# Patient Record
Sex: FEMALE | Race: OTHER | HISPANIC OR LATINO | URBAN - METROPOLITAN AREA
[De-identification: names, ages, dates, MRNs, and addresses within clinical notes are randomized per-mention and may not be internally consistent; named-entity substitution may affect disease eponyms.]

---

## 2017-10-20 ENCOUNTER — EMERGENCY (EMERGENCY)
Facility: HOSPITAL | Age: 52
LOS: 1 days | Discharge: HOME | End: 2017-10-20
Attending: EMERGENCY MEDICINE | Admitting: EMERGENCY MEDICINE
Payer: COMMERCIAL

## 2017-10-20 VITALS
SYSTOLIC BLOOD PRESSURE: 122 MMHG | OXYGEN SATURATION: 96 % | RESPIRATION RATE: 16 BRPM | DIASTOLIC BLOOD PRESSURE: 78 MMHG | HEART RATE: 79 BPM | TEMPERATURE: 98 F

## 2017-10-20 VITALS
DIASTOLIC BLOOD PRESSURE: 91 MMHG | RESPIRATION RATE: 18 BRPM | SYSTOLIC BLOOD PRESSURE: 138 MMHG | HEART RATE: 88 BPM | OXYGEN SATURATION: 99 %

## 2017-10-20 LAB
ALBUMIN SERPL ELPH-MCNC: 3.3 G/DL — SIGNIFICANT CHANGE UP (ref 3.3–5)
ALP SERPL-CCNC: 80 U/L — SIGNIFICANT CHANGE UP (ref 40–120)
ALT FLD-CCNC: 9 U/L — SIGNIFICANT CHANGE UP (ref 4–33)
AST SERPL-CCNC: 13 U/L — SIGNIFICANT CHANGE UP (ref 4–32)
BASE EXCESS BLDV CALC-SCNC: -1.8 MMOL/L — SIGNIFICANT CHANGE UP
BASOPHILS # BLD AUTO: 0.03 K/UL — SIGNIFICANT CHANGE UP (ref 0–0.2)
BASOPHILS NFR BLD AUTO: 0.6 % — SIGNIFICANT CHANGE UP (ref 0–2)
BILIRUB SERPL-MCNC: < 0.2 MG/DL — LOW (ref 0.2–1.2)
BLOOD GAS VENOUS - CREATININE: 0.82 MG/DL — SIGNIFICANT CHANGE UP (ref 0.5–1.3)
BUN SERPL-MCNC: 17 MG/DL — SIGNIFICANT CHANGE UP (ref 7–23)
CALCIUM SERPL-MCNC: 8.1 MG/DL — LOW (ref 8.4–10.5)
CHLORIDE BLDV-SCNC: 111 MMOL/L — HIGH (ref 96–108)
CHLORIDE SERPL-SCNC: 108 MMOL/L — HIGH (ref 98–107)
CK MB BLD-MCNC: 1 NG/ML — SIGNIFICANT CHANGE UP (ref 1–4.7)
CK SERPL-CCNC: 63 U/L — SIGNIFICANT CHANGE UP (ref 25–170)
CO2 SERPL-SCNC: 22 MMOL/L — SIGNIFICANT CHANGE UP (ref 22–31)
CREAT SERPL-MCNC: 0.81 MG/DL — SIGNIFICANT CHANGE UP (ref 0.5–1.3)
EOSINOPHIL # BLD AUTO: 0.08 K/UL — SIGNIFICANT CHANGE UP (ref 0–0.5)
EOSINOPHIL NFR BLD AUTO: 1.5 % — SIGNIFICANT CHANGE UP (ref 0–6)
GAS PNL BLDV: 137 MMOL/L — SIGNIFICANT CHANGE UP (ref 136–146)
GLUCOSE BLDV-MCNC: 110 — HIGH (ref 70–99)
GLUCOSE SERPL-MCNC: 105 MG/DL — HIGH (ref 70–99)
HCO3 BLDV-SCNC: 22 MMOL/L — SIGNIFICANT CHANGE UP (ref 20–27)
HCT VFR BLD CALC: 38.1 % — SIGNIFICANT CHANGE UP (ref 34.5–45)
HCT VFR BLDV CALC: 38.2 % — SIGNIFICANT CHANGE UP (ref 34.5–45)
HGB BLD-MCNC: 12 G/DL — SIGNIFICANT CHANGE UP (ref 11.5–15.5)
HGB BLDV-MCNC: 12.4 G/DL — SIGNIFICANT CHANGE UP (ref 11.5–15.5)
IMM GRANULOCYTES # BLD AUTO: 0 # — SIGNIFICANT CHANGE UP
IMM GRANULOCYTES NFR BLD AUTO: 0 % — SIGNIFICANT CHANGE UP (ref 0–1.5)
LACTATE BLDV-MCNC: 1.3 MMOL/L — SIGNIFICANT CHANGE UP (ref 0.5–2)
LIDOCAIN IGE QN: 21.2 U/L — SIGNIFICANT CHANGE UP (ref 7–60)
LYMPHOCYTES # BLD AUTO: 1.01 K/UL — SIGNIFICANT CHANGE UP (ref 1–3.3)
LYMPHOCYTES # BLD AUTO: 19.2 % — SIGNIFICANT CHANGE UP (ref 13–44)
MAGNESIUM SERPL-MCNC: 1.8 MG/DL — SIGNIFICANT CHANGE UP (ref 1.6–2.6)
MCHC RBC-ENTMCNC: 28.5 PG — SIGNIFICANT CHANGE UP (ref 27–34)
MCHC RBC-ENTMCNC: 31.5 % — LOW (ref 32–36)
MCV RBC AUTO: 90.5 FL — SIGNIFICANT CHANGE UP (ref 80–100)
MONOCYTES # BLD AUTO: 0.44 K/UL — SIGNIFICANT CHANGE UP (ref 0–0.9)
MONOCYTES NFR BLD AUTO: 8.4 % — SIGNIFICANT CHANGE UP (ref 2–14)
NEUTROPHILS # BLD AUTO: 3.7 K/UL — SIGNIFICANT CHANGE UP (ref 1.8–7.4)
NEUTROPHILS NFR BLD AUTO: 70.3 % — SIGNIFICANT CHANGE UP (ref 43–77)
NRBC # FLD: 0 — SIGNIFICANT CHANGE UP
PCO2 BLDV: 51 MMHG — SIGNIFICANT CHANGE UP (ref 41–51)
PH BLDV: 7.29 PH — LOW (ref 7.32–7.43)
PHOSPHATE SERPL-MCNC: 2.7 MG/DL — SIGNIFICANT CHANGE UP (ref 2.5–4.5)
PLATELET # BLD AUTO: 315 K/UL — SIGNIFICANT CHANGE UP (ref 150–400)
PMV BLD: 10.8 FL — SIGNIFICANT CHANGE UP (ref 7–13)
PO2 BLDV: 41 MMHG — HIGH (ref 35–40)
POTASSIUM BLDV-SCNC: 3.3 MMOL/L — LOW (ref 3.4–4.5)
POTASSIUM SERPL-MCNC: 3.6 MMOL/L — SIGNIFICANT CHANGE UP (ref 3.5–5.3)
POTASSIUM SERPL-SCNC: 3.6 MMOL/L — SIGNIFICANT CHANGE UP (ref 3.5–5.3)
PROT SERPL-MCNC: 6.3 G/DL — SIGNIFICANT CHANGE UP (ref 6–8.3)
RBC # BLD: 4.21 M/UL — SIGNIFICANT CHANGE UP (ref 3.8–5.2)
RBC # FLD: 13.8 % — SIGNIFICANT CHANGE UP (ref 10.3–14.5)
SAO2 % BLDV: 67.4 % — SIGNIFICANT CHANGE UP (ref 60–85)
SODIUM SERPL-SCNC: 142 MMOL/L — SIGNIFICANT CHANGE UP (ref 135–145)
TROPONIN T SERPL-MCNC: < 0.06 NG/ML — SIGNIFICANT CHANGE UP (ref 0–0.06)
TSH SERPL-MCNC: 1.04 UIU/ML — SIGNIFICANT CHANGE UP (ref 0.27–4.2)
WBC # BLD: 5.26 K/UL — SIGNIFICANT CHANGE UP (ref 3.8–10.5)
WBC # FLD AUTO: 5.26 K/UL — SIGNIFICANT CHANGE UP (ref 3.8–10.5)

## 2017-10-20 PROCEDURE — 99285 EMERGENCY DEPT VISIT HI MDM: CPT | Mod: 25

## 2017-10-20 PROCEDURE — 71020: CPT | Mod: 26

## 2017-10-20 RX ORDER — ONDANSETRON 8 MG/1
1 TABLET, FILM COATED ORAL
Qty: 1 | Refills: 0
Start: 2017-10-20 | End: 2017-10-23

## 2017-10-20 RX ORDER — FAMOTIDINE 10 MG/ML
20 INJECTION INTRAVENOUS ONCE
Qty: 0 | Refills: 0 | Status: COMPLETED | OUTPATIENT
Start: 2017-10-20 | End: 2017-10-20

## 2017-10-20 RX ADMIN — Medication 30 MILLILITER(S): at 06:26

## 2017-10-20 RX ADMIN — FAMOTIDINE 20 MILLIGRAM(S): 10 INJECTION INTRAVENOUS at 06:26

## 2017-10-20 NOTE — ED ADULT NURSE NOTE - CHIEF COMPLAINT QUOTE
Pt c/o severe mid-abdominal pain, states "it feels like indigestion."  Pt reports last BM yesterday was normal.  Pt given Zofran 4mg IV by EMS.

## 2017-10-20 NOTE — ED ADULT TRIAGE NOTE - CHIEF COMPLAINT QUOTE
Pt c/o severe mid-abdominal pain, states "it feels like indigestion."  Pt given Zofran 4mg IV by EMS. Pt c/o severe mid-abdominal pain, states "it feels like indigestion."  Pt reports last BM yesterday was normal.  Pt given Zofran 4mg IV by EMS.

## 2017-10-20 NOTE — ED ADULT NURSE NOTE - OBJECTIVE STATEMENT
Received pt to room 26. pt is alert and oriented x3. c/o feeling weak and having abdominal pain. pt passed out after having a bowel movement. no CP or SOB or nausea vomiting or diarrhea. 20 G placed on left wrist by EMS and IV NS infusing  . pt denies dizziness . EKG dome. MD norris in progress.
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
## 2017-10-20 NOTE — ED PROVIDER NOTE - ATTENDING CONTRIBUTION TO CARE
DR. GUEVARA, ATTENDING MD-  I performed a face to face bedside interview with patient regarding history of present illness, review of symptoms and past medical history. I completed an independent physical exam.  I have discussed patient's plan of care with the resident.   Documentation as above in the note.  HPI: 53 yo F with hypothyroid, GERD per EGD that presents with epigastric pain and ?syncope tonight after having BM. Pt reports recently under a lot of stress due to trying to get demented father in assisted living and work. Recently trying to lose weight but under supervision by MD. Was at home, woke up from epigastric pain, went to bathroom, stood up, next thing she knew she was on ground. unsure if hit head, no head pain now. Denies N/V, vision/hearing changes, no chest pain, SOB. Epigastric pain non radiating, no back pain. Denies numbness/tingling, weakness, fevers, chills, diarrhea. Had a hard time having BM. Able to pass gas. No recent illnesses/travel/trauma. No long immobilization, no history of DVT/PE and no famhx of this. Saw rheum MD a year ago was told no autoimmune diseases. Denies smoking, drinking, drugs. not sleeping well lately. No bleeding from GI recently. LMP last week, heavier than normal.   EXAM: Well appearing, NAD, head atraumatic, eyes EOMI/PERRL, Abd soft nontender NEG MURHPYS, NEG MCBURNEYS, no rebound, no guarding, extremities all normal with normal pulses. no MSK deformities.   MDM: Worry for GERD vs pancreatitis. Unlikely ACS or PE or AAA. No HTN, no smoking, < 65 years old, no CAD, no HLP. No risk factors. Possibly GERD secondary to known GERD from EGD and epigastric pain without GIB. Also syncope worry for vasovagal. She had diaphoresis prior to syncope, straining. Will obtain labs and imaging and reassess. EKG NSR, no ischemic changes. Pt and  are reluctant to stay in hospital and would rather go home and f/u outpt dependant on outcome of tests.

## 2017-12-22 ENCOUNTER — OUTPATIENT (OUTPATIENT)
Dept: OUTPATIENT SERVICES | Facility: HOSPITAL | Age: 52
LOS: 1 days | End: 2017-12-22
Payer: COMMERCIAL

## 2017-12-22 ENCOUNTER — APPOINTMENT (OUTPATIENT)
Dept: ULTRASOUND IMAGING | Facility: CLINIC | Age: 52
End: 2017-12-22
Payer: COMMERCIAL

## 2017-12-22 ENCOUNTER — APPOINTMENT (OUTPATIENT)
Dept: MAMMOGRAPHY | Facility: CLINIC | Age: 52
End: 2017-12-22
Payer: COMMERCIAL

## 2017-12-22 DIAGNOSIS — Z00.8 ENCOUNTER FOR OTHER GENERAL EXAMINATION: ICD-10-CM

## 2017-12-22 PROCEDURE — 76641 ULTRASOUND BREAST COMPLETE: CPT

## 2017-12-22 PROCEDURE — 77063 BREAST TOMOSYNTHESIS BI: CPT | Mod: 26

## 2017-12-22 PROCEDURE — G0202: CPT | Mod: 26

## 2017-12-22 PROCEDURE — 76641 ULTRASOUND BREAST COMPLETE: CPT | Mod: 26,50

## 2017-12-22 PROCEDURE — 77067 SCR MAMMO BI INCL CAD: CPT

## 2017-12-22 PROCEDURE — 77063 BREAST TOMOSYNTHESIS BI: CPT

## 2018-08-11 ENCOUNTER — APPOINTMENT (OUTPATIENT)
Dept: ULTRASOUND IMAGING | Facility: IMAGING CENTER | Age: 53
End: 2018-08-11
Payer: COMMERCIAL

## 2018-08-11 ENCOUNTER — OUTPATIENT (OUTPATIENT)
Dept: OUTPATIENT SERVICES | Facility: HOSPITAL | Age: 53
LOS: 1 days | End: 2018-08-11
Payer: COMMERCIAL

## 2018-08-11 DIAGNOSIS — Z00.8 ENCOUNTER FOR OTHER GENERAL EXAMINATION: ICD-10-CM

## 2018-08-11 PROCEDURE — 76830 TRANSVAGINAL US NON-OB: CPT | Mod: 26

## 2018-08-11 PROCEDURE — 76856 US EXAM PELVIC COMPLETE: CPT

## 2018-08-11 PROCEDURE — 76830 TRANSVAGINAL US NON-OB: CPT

## 2019-01-28 ENCOUNTER — APPOINTMENT (OUTPATIENT)
Dept: PLASTIC SURGERY | Facility: CLINIC | Age: 54
End: 2019-01-28
Payer: SELF-PAY

## 2019-01-28 VITALS — HEIGHT: 63 IN | BODY MASS INDEX: 32.78 KG/M2 | WEIGHT: 185 LBS

## 2019-01-28 DIAGNOSIS — Z78.9 OTHER SPECIFIED HEALTH STATUS: ICD-10-CM

## 2019-01-28 DIAGNOSIS — Z83.3 FAMILY HISTORY OF DIABETES MELLITUS: ICD-10-CM

## 2019-01-28 DIAGNOSIS — E03.9 HYPOTHYROIDISM, UNSPECIFIED: ICD-10-CM

## 2019-01-28 DIAGNOSIS — E06.3 AUTOIMMUNE THYROIDITIS: ICD-10-CM

## 2019-01-28 PROCEDURE — 99201 OFFICE OUTPATIENT NEW 10 MINUTES: CPT

## 2019-01-28 RX ORDER — LIRAGLUTIDE 6 MG/ML
INJECTION, SOLUTION SUBCUTANEOUS
Refills: 0 | Status: ACTIVE | COMMUNITY

## 2019-01-28 RX ORDER — LORCASERIN HYDROCHLORIDE HEMIHYDRATE 10 MG/1
TABLET ORAL
Refills: 0 | Status: ACTIVE | COMMUNITY

## 2019-01-28 RX ORDER — LEVOTHYROXINE SODIUM 0.11 MG/1
112 TABLET ORAL
Refills: 0 | Status: ACTIVE | COMMUNITY

## 2019-01-28 RX ORDER — BUPROPION HYDROCHLORIDE 75 MG/1
TABLET, FILM COATED ORAL
Refills: 0 | Status: ACTIVE | COMMUNITY

## 2019-02-01 NOTE — REVIEW OF SYSTEMS
[Negative] : Heme/Lymph [Fever] : no fever [Chills] : no chills [Nosebleeds] : no nosebleeds [Nasal Discharge] : no nasal discharge [Sore Throat] : no sore throat [Chest Pain] : no chest pain [Palpitations] : no palpitations [Shortness Of Breath] : no shortness of breath [Cough] : no cough [Abdominal Pain] : no abdominal pain [Dysuria] : no dysuria [Easy Bleeding] : no tendency for easy bleeding [Easy Bruising] : no tendency for easy bruising

## 2019-02-01 NOTE — REASON FOR VISIT
[Consultation] : a consultation visit [FreeTextEntry1] : Patient presents to the office today for an abdominoplasty and breast lift consultation. Patient states she had four children via- . Patient states she gain a lot of weight during all pregnancies. Patient states with dieting and exercising she has slowly been losing weight. Patient states her current weight is 185lb. Patient denies rashes under excess skin. Patient states her current bra size is 36B to C. Patient states she breast fed all her four children after births. Patient c/o ptosis of bilateral breasts. Patient denies nipple discharge and nipple inversion. Patient states all breast imaging is up to date and reported to be WNL. Patient states there is family Hx of breast cancer: mother stage one.

## 2019-02-02 ENCOUNTER — EMERGENCY (EMERGENCY)
Facility: HOSPITAL | Age: 54
LOS: 1 days | Discharge: HOME | End: 2019-02-02
Attending: EMERGENCY MEDICINE | Admitting: EMERGENCY MEDICINE
Payer: COMMERCIAL

## 2019-02-02 VITALS
TEMPERATURE: 98 F | SYSTOLIC BLOOD PRESSURE: 135 MMHG | OXYGEN SATURATION: 100 % | DIASTOLIC BLOOD PRESSURE: 91 MMHG | HEART RATE: 95 BPM | RESPIRATION RATE: 20 BRPM

## 2019-02-02 VITALS
RESPIRATION RATE: 17 BRPM | SYSTOLIC BLOOD PRESSURE: 134 MMHG | OXYGEN SATURATION: 99 % | DIASTOLIC BLOOD PRESSURE: 86 MMHG | HEART RATE: 85 BPM | TEMPERATURE: 98 F

## 2019-02-02 LAB
ALBUMIN SERPL ELPH-MCNC: 3.9 G/DL — SIGNIFICANT CHANGE UP (ref 3.3–5)
ALP SERPL-CCNC: 56 U/L — SIGNIFICANT CHANGE UP (ref 40–120)
ALT FLD-CCNC: 14 U/L — SIGNIFICANT CHANGE UP (ref 4–33)
ANION GAP SERPL CALC-SCNC: 12 MMO/L — SIGNIFICANT CHANGE UP (ref 7–14)
APPEARANCE UR: CLEAR — SIGNIFICANT CHANGE UP
AST SERPL-CCNC: 17 U/L — SIGNIFICANT CHANGE UP (ref 4–32)
BACTERIA # UR AUTO: NEGATIVE — SIGNIFICANT CHANGE UP
BASOPHILS # BLD AUTO: 0.02 K/UL — SIGNIFICANT CHANGE UP (ref 0–0.2)
BASOPHILS NFR BLD AUTO: 0.4 % — SIGNIFICANT CHANGE UP (ref 0–2)
BILIRUB SERPL-MCNC: 0.3 MG/DL — SIGNIFICANT CHANGE UP (ref 0.2–1.2)
BILIRUB UR-MCNC: NEGATIVE — SIGNIFICANT CHANGE UP
BLD GP AB SCN SERPL QL: NEGATIVE — SIGNIFICANT CHANGE UP
BLOOD UR QL VISUAL: HIGH
BUN SERPL-MCNC: 12 MG/DL — SIGNIFICANT CHANGE UP (ref 7–23)
CALCIUM SERPL-MCNC: 9.3 MG/DL — SIGNIFICANT CHANGE UP (ref 8.4–10.5)
CHLORIDE SERPL-SCNC: 101 MMOL/L — SIGNIFICANT CHANGE UP (ref 98–107)
CO2 SERPL-SCNC: 25 MMOL/L — SIGNIFICANT CHANGE UP (ref 22–31)
COLOR SPEC: SIGNIFICANT CHANGE UP
CREAT SERPL-MCNC: 0.78 MG/DL — SIGNIFICANT CHANGE UP (ref 0.5–1.3)
EOSINOPHIL # BLD AUTO: 0.04 K/UL — SIGNIFICANT CHANGE UP (ref 0–0.5)
EOSINOPHIL NFR BLD AUTO: 0.8 % — SIGNIFICANT CHANGE UP (ref 0–6)
GLUCOSE SERPL-MCNC: 70 MG/DL — SIGNIFICANT CHANGE UP (ref 70–99)
GLUCOSE UR-MCNC: NEGATIVE — SIGNIFICANT CHANGE UP
HCT VFR BLD CALC: 37.8 % — SIGNIFICANT CHANGE UP (ref 34.5–45)
HGB BLD-MCNC: 11.9 G/DL — SIGNIFICANT CHANGE UP (ref 11.5–15.5)
HYALINE CASTS # UR AUTO: NEGATIVE — SIGNIFICANT CHANGE UP
IMM GRANULOCYTES NFR BLD AUTO: 0.2 % — SIGNIFICANT CHANGE UP (ref 0–1.5)
KETONES UR-MCNC: NEGATIVE — SIGNIFICANT CHANGE UP
LEUKOCYTE ESTERASE UR-ACNC: NEGATIVE — SIGNIFICANT CHANGE UP
LYMPHOCYTES # BLD AUTO: 1.37 K/UL — SIGNIFICANT CHANGE UP (ref 1–3.3)
LYMPHOCYTES # BLD AUTO: 28.4 % — SIGNIFICANT CHANGE UP (ref 13–44)
MCHC RBC-ENTMCNC: 28.3 PG — SIGNIFICANT CHANGE UP (ref 27–34)
MCHC RBC-ENTMCNC: 31.5 % — LOW (ref 32–36)
MCV RBC AUTO: 90 FL — SIGNIFICANT CHANGE UP (ref 80–100)
MONOCYTES # BLD AUTO: 0.34 K/UL — SIGNIFICANT CHANGE UP (ref 0–0.9)
MONOCYTES NFR BLD AUTO: 7.1 % — SIGNIFICANT CHANGE UP (ref 2–14)
NEUTROPHILS # BLD AUTO: 3.04 K/UL — SIGNIFICANT CHANGE UP (ref 1.8–7.4)
NEUTROPHILS NFR BLD AUTO: 63.1 % — SIGNIFICANT CHANGE UP (ref 43–77)
NITRITE UR-MCNC: NEGATIVE — SIGNIFICANT CHANGE UP
NRBC # FLD: 0 K/UL — LOW (ref 25–125)
PH UR: 6 — SIGNIFICANT CHANGE UP (ref 5–8)
PLATELET # BLD AUTO: 310 K/UL — SIGNIFICANT CHANGE UP (ref 150–400)
PMV BLD: 10.2 FL — SIGNIFICANT CHANGE UP (ref 7–13)
POTASSIUM SERPL-MCNC: 4 MMOL/L — SIGNIFICANT CHANGE UP (ref 3.5–5.3)
POTASSIUM SERPL-SCNC: 4 MMOL/L — SIGNIFICANT CHANGE UP (ref 3.5–5.3)
PROT SERPL-MCNC: 7.1 G/DL — SIGNIFICANT CHANGE UP (ref 6–8.3)
PROT UR-MCNC: 20 — SIGNIFICANT CHANGE UP
RBC # BLD: 4.2 M/UL — SIGNIFICANT CHANGE UP (ref 3.8–5.2)
RBC # FLD: 13.3 % — SIGNIFICANT CHANGE UP (ref 10.3–14.5)
RBC CASTS # UR COMP ASSIST: HIGH (ref 0–?)
RH IG SCN BLD-IMP: POSITIVE — SIGNIFICANT CHANGE UP
SODIUM SERPL-SCNC: 138 MMOL/L — SIGNIFICANT CHANGE UP (ref 135–145)
SP GR SPEC: 1.01 — SIGNIFICANT CHANGE UP (ref 1–1.04)
SQUAMOUS # UR AUTO: SIGNIFICANT CHANGE UP
UROBILINOGEN FLD QL: NORMAL — SIGNIFICANT CHANGE UP
WBC # BLD: 4.82 K/UL — SIGNIFICANT CHANGE UP (ref 3.8–10.5)
WBC # FLD AUTO: 4.82 K/UL — SIGNIFICANT CHANGE UP (ref 3.8–10.5)
WBC UR QL: SIGNIFICANT CHANGE UP (ref 0–?)

## 2019-02-02 PROCEDURE — 76830 TRANSVAGINAL US NON-OB: CPT | Mod: 26

## 2019-02-02 PROCEDURE — 99284 EMERGENCY DEPT VISIT MOD MDM: CPT

## 2019-02-02 RX ORDER — IBUPROFEN 200 MG
600 TABLET ORAL ONCE
Qty: 0 | Refills: 0 | Status: DISCONTINUED | OUTPATIENT
Start: 2019-02-02 | End: 2019-02-02

## 2019-02-02 RX ORDER — IBUPROFEN 200 MG
1 TABLET ORAL
Qty: 40 | Refills: 0
Start: 2019-02-02 | End: 2019-02-11

## 2019-02-02 RX ORDER — KETOROLAC TROMETHAMINE 30 MG/ML
30 SYRINGE (ML) INJECTION ONCE
Qty: 0 | Refills: 0 | Status: DISCONTINUED | OUTPATIENT
Start: 2019-02-02 | End: 2019-02-02

## 2019-02-02 RX ADMIN — Medication 30 MILLIGRAM(S): at 19:46

## 2019-02-02 NOTE — ED PROVIDER NOTE - ATTENDING CONTRIBUTION TO CARE
agree with resident note  "53F, pmh of fibroids, endometriosis presenting with vaginal bleeding. Patient reports 10 days of heavy vaginal bleeding with clots. Goes through 8 pads a day. Has associated lower abdominal cramping and generalized fatigue. "  Denies chest pain, SOB, syncope.      PE: well appearing; no pallor; VSS: CTAB/L; s1 s2 no m/r/g abd soft/minimal uterine tenderness; ext: no edema    Imp: likely fibroids; will get labs and US and reassess

## 2019-02-02 NOTE — ED PROVIDER NOTE - PROGRESS NOTE DETAILS
updated patient and family on results. will discharge with OB/GYN follow-up. - resident Diogenes Campos

## 2019-02-02 NOTE — ED PROVIDER NOTE - NSFOLLOWUPINSTRUCTIONS_ED_ALL_ED_FT
Please follow-up with your OB/GYN in the next 24-48 hours   If you have any chest pain, difficulty breathing or feel lightheaded please return to the emergency department

## 2019-02-02 NOTE — ED ADULT NURSE NOTE - OBJECTIVE STATEMENT
pt c/o vag bleed increasing passing clots and feeling very tired/ iv inserted and labs done pt c/o vag bleed increasing passing clots and feeling very tired/ iv inserted and labs done, pt comfortable

## 2019-02-02 NOTE — ED PROVIDER NOTE - MEDICAL DECISION MAKING DETAILS
53F presenting with abnormal vaginal bleeding. no chest pain or difficulty breathing. small amount of blood in vaginal vault. bleeding likely 2/2 fibroids. plan for cbc, cmp, ucg, transvaginal ultrasound. will reassess.

## 2019-02-02 NOTE — ED ADULT TRIAGE NOTE - CHIEF COMPLAINT QUOTE
pt with Hx. Fibroids, endometriosis coming heavy vag. bleeding with blood clots x 10 days.  taking pain meds w/o relief, on birth control for vag. bleeding control.

## 2019-02-02 NOTE — ED PROVIDER NOTE - PHYSICAL EXAMINATION
General: well appearing female, no acute distress   HEENT: normocephalic, atraumatic   Respiratory: normal work of breathing, lungs clear to auscultation bilaterally   Cardiac: regular rate and rhythm   Abdomen: soft, non-tender   : small amount of blood in vaginal vault, no cervical lesions (Patience Brewer)  MSk: no swelling or tenderness of lower extremities   Neuro: A&Ox3

## 2019-02-02 NOTE — ED PROVIDER NOTE - OBJECTIVE STATEMENT
53F, pmh of fibroids, endometriosis presenting with vaginal bleeding. Patient reports 10 days of heavy vaginal bleeding with clots. Goes through 8 pads a day. Has associated lower abdominal cramping and generalized fatigue. Denies any fever, chest pain, difficulty breathing, nausea, vomiting. Has urinary frequency but no pain with urination. Seen by OB/GYN two weeks ago without any abnormalities. Patient takes OCPs.

## 2019-02-04 LAB
BACTERIA UR CULT: SIGNIFICANT CHANGE UP
SPECIMEN SOURCE: SIGNIFICANT CHANGE UP

## 2019-02-20 ENCOUNTER — FORM ENCOUNTER (OUTPATIENT)
Age: 54
End: 2019-02-20

## 2019-02-21 ENCOUNTER — OUTPATIENT (OUTPATIENT)
Dept: OUTPATIENT SERVICES | Facility: HOSPITAL | Age: 54
LOS: 1 days | End: 2019-02-21
Payer: COMMERCIAL

## 2019-02-21 ENCOUNTER — APPOINTMENT (OUTPATIENT)
Dept: CT IMAGING | Facility: CLINIC | Age: 54
End: 2019-02-21
Payer: COMMERCIAL

## 2019-02-21 DIAGNOSIS — Z00.8 ENCOUNTER FOR OTHER GENERAL EXAMINATION: ICD-10-CM

## 2019-02-21 PROCEDURE — 74177 CT ABD & PELVIS W/CONTRAST: CPT

## 2019-02-21 PROCEDURE — 74177 CT ABD & PELVIS W/CONTRAST: CPT | Mod: 26

## 2019-02-22 ENCOUNTER — APPOINTMENT (OUTPATIENT)
Dept: ULTRASOUND IMAGING | Facility: IMAGING CENTER | Age: 54
End: 2019-02-22
Payer: COMMERCIAL

## 2019-02-22 ENCOUNTER — APPOINTMENT (OUTPATIENT)
Dept: MAMMOGRAPHY | Facility: IMAGING CENTER | Age: 54
End: 2019-02-22
Payer: COMMERCIAL

## 2019-02-22 ENCOUNTER — OUTPATIENT (OUTPATIENT)
Dept: OUTPATIENT SERVICES | Facility: HOSPITAL | Age: 54
LOS: 1 days | End: 2019-02-22
Payer: COMMERCIAL

## 2019-02-22 DIAGNOSIS — Z00.8 ENCOUNTER FOR OTHER GENERAL EXAMINATION: ICD-10-CM

## 2019-02-22 PROCEDURE — 77067 SCR MAMMO BI INCL CAD: CPT | Mod: 26

## 2019-02-22 PROCEDURE — 76641 ULTRASOUND BREAST COMPLETE: CPT | Mod: 26,50

## 2019-02-22 PROCEDURE — 77063 BREAST TOMOSYNTHESIS BI: CPT | Mod: 26

## 2019-02-22 PROCEDURE — 76641 ULTRASOUND BREAST COMPLETE: CPT

## 2019-02-22 PROCEDURE — 77067 SCR MAMMO BI INCL CAD: CPT

## 2019-02-22 PROCEDURE — 77063 BREAST TOMOSYNTHESIS BI: CPT

## 2019-02-27 ENCOUNTER — APPOINTMENT (OUTPATIENT)
Dept: ULTRASOUND IMAGING | Facility: IMAGING CENTER | Age: 54
End: 2019-02-27
Payer: COMMERCIAL

## 2019-02-27 ENCOUNTER — APPOINTMENT (OUTPATIENT)
Dept: MAMMOGRAPHY | Facility: IMAGING CENTER | Age: 54
End: 2019-02-27
Payer: COMMERCIAL

## 2019-02-27 ENCOUNTER — OUTPATIENT (OUTPATIENT)
Dept: OUTPATIENT SERVICES | Facility: HOSPITAL | Age: 54
LOS: 1 days | End: 2019-02-27
Payer: COMMERCIAL

## 2019-02-27 DIAGNOSIS — Z00.8 ENCOUNTER FOR OTHER GENERAL EXAMINATION: ICD-10-CM

## 2019-02-27 PROCEDURE — 76642 ULTRASOUND BREAST LIMITED: CPT | Mod: 26,RT

## 2019-02-27 PROCEDURE — G0279: CPT

## 2019-02-27 PROCEDURE — 76642 ULTRASOUND BREAST LIMITED: CPT

## 2019-02-27 PROCEDURE — 77065 DX MAMMO INCL CAD UNI: CPT | Mod: 26,RT

## 2019-02-27 PROCEDURE — G0279: CPT | Mod: 26

## 2019-02-27 PROCEDURE — 77065 DX MAMMO INCL CAD UNI: CPT

## 2019-03-15 ENCOUNTER — EMERGENCY (EMERGENCY)
Facility: HOSPITAL | Age: 54
LOS: 1 days | Discharge: HOME | End: 2019-03-15
Attending: EMERGENCY MEDICINE | Admitting: EMERGENCY MEDICINE
Payer: COMMERCIAL

## 2019-03-15 VITALS
RESPIRATION RATE: 18 BRPM | DIASTOLIC BLOOD PRESSURE: 83 MMHG | OXYGEN SATURATION: 99 % | TEMPERATURE: 98 F | SYSTOLIC BLOOD PRESSURE: 119 MMHG | HEART RATE: 96 BPM

## 2019-03-15 LAB
ALBUMIN SERPL ELPH-MCNC: 4 G/DL — SIGNIFICANT CHANGE UP (ref 3.3–5)
ALP SERPL-CCNC: 69 U/L — SIGNIFICANT CHANGE UP (ref 40–120)
ALT FLD-CCNC: 26 U/L — SIGNIFICANT CHANGE UP (ref 4–33)
ANION GAP SERPL CALC-SCNC: 11 MMO/L — SIGNIFICANT CHANGE UP (ref 7–14)
ANISOCYTOSIS BLD QL: SLIGHT — SIGNIFICANT CHANGE UP
APPEARANCE UR: CLEAR — SIGNIFICANT CHANGE UP
AST SERPL-CCNC: 22 U/L — SIGNIFICANT CHANGE UP (ref 4–32)
BACTERIA # UR AUTO: NEGATIVE — SIGNIFICANT CHANGE UP
BASOPHILS # BLD AUTO: 0.02 K/UL — SIGNIFICANT CHANGE UP (ref 0–0.2)
BASOPHILS NFR BLD AUTO: 0.7 % — SIGNIFICANT CHANGE UP (ref 0–2)
BASOPHILS NFR SPEC: 0 % — SIGNIFICANT CHANGE UP (ref 0–2)
BILIRUB SERPL-MCNC: < 0.2 MG/DL — LOW (ref 0.2–1.2)
BILIRUB UR-MCNC: NEGATIVE — SIGNIFICANT CHANGE UP
BLASTS # FLD: 0 % — SIGNIFICANT CHANGE UP (ref 0–0)
BLD GP AB SCN SERPL QL: NEGATIVE — SIGNIFICANT CHANGE UP
BLOOD UR QL VISUAL: SIGNIFICANT CHANGE UP
BUN SERPL-MCNC: 14 MG/DL — SIGNIFICANT CHANGE UP (ref 7–23)
CALCIUM SERPL-MCNC: 9.3 MG/DL — SIGNIFICANT CHANGE UP (ref 8.4–10.5)
CHLORIDE SERPL-SCNC: 103 MMOL/L — SIGNIFICANT CHANGE UP (ref 98–107)
CO2 SERPL-SCNC: 25 MMOL/L — SIGNIFICANT CHANGE UP (ref 22–31)
COLOR SPEC: YELLOW — SIGNIFICANT CHANGE UP
CREAT SERPL-MCNC: 0.76 MG/DL — SIGNIFICANT CHANGE UP (ref 0.5–1.3)
EOSINOPHIL # BLD AUTO: 0.03 K/UL — SIGNIFICANT CHANGE UP (ref 0–0.5)
EOSINOPHIL NFR BLD AUTO: 1 % — SIGNIFICANT CHANGE UP (ref 0–6)
EOSINOPHIL NFR FLD: 0.9 % — SIGNIFICANT CHANGE UP (ref 0–6)
GIANT PLATELETS BLD QL SMEAR: PRESENT — SIGNIFICANT CHANGE UP
GLUCOSE SERPL-MCNC: 89 MG/DL — SIGNIFICANT CHANGE UP (ref 70–99)
GLUCOSE UR-MCNC: NEGATIVE — SIGNIFICANT CHANGE UP
HCG SERPL-ACNC: < 5 MIU/ML — SIGNIFICANT CHANGE UP
HCT VFR BLD CALC: 40.8 % — SIGNIFICANT CHANGE UP (ref 34.5–45)
HGB BLD-MCNC: 12.5 G/DL — SIGNIFICANT CHANGE UP (ref 11.5–15.5)
HYALINE CASTS # UR AUTO: NEGATIVE — SIGNIFICANT CHANGE UP
IMM GRANULOCYTES NFR BLD AUTO: 0 % — SIGNIFICANT CHANGE UP (ref 0–1.5)
KETONES UR-MCNC: NEGATIVE — SIGNIFICANT CHANGE UP
LEUKOCYTE ESTERASE UR-ACNC: SIGNIFICANT CHANGE UP
LIDOCAIN IGE QN: 14.3 U/L — SIGNIFICANT CHANGE UP (ref 7–60)
LYMPHOCYTES # BLD AUTO: 1.1 K/UL — SIGNIFICANT CHANGE UP (ref 1–3.3)
LYMPHOCYTES # BLD AUTO: 36.2 % — SIGNIFICANT CHANGE UP (ref 13–44)
LYMPHOCYTES NFR SPEC AUTO: 24.3 % — SIGNIFICANT CHANGE UP (ref 13–44)
MACROCYTES BLD QL: SLIGHT — SIGNIFICANT CHANGE UP
MCHC RBC-ENTMCNC: 28.2 PG — SIGNIFICANT CHANGE UP (ref 27–34)
MCHC RBC-ENTMCNC: 30.6 % — LOW (ref 32–36)
MCV RBC AUTO: 92.1 FL — SIGNIFICANT CHANGE UP (ref 80–100)
METAMYELOCYTES # FLD: 0 % — SIGNIFICANT CHANGE UP (ref 0–1)
MONOCYTES # BLD AUTO: 0.29 K/UL — SIGNIFICANT CHANGE UP (ref 0–0.9)
MONOCYTES NFR BLD AUTO: 9.5 % — SIGNIFICANT CHANGE UP (ref 2–14)
MONOCYTES NFR BLD: 4.5 % — SIGNIFICANT CHANGE UP (ref 2–9)
MYELOCYTES NFR BLD: 0 % — SIGNIFICANT CHANGE UP (ref 0–0)
NEUTROPHIL AB SER-ACNC: 61.3 % — SIGNIFICANT CHANGE UP (ref 43–77)
NEUTROPHILS # BLD AUTO: 1.6 K/UL — LOW (ref 1.8–7.4)
NEUTROPHILS NFR BLD AUTO: 52.6 % — SIGNIFICANT CHANGE UP (ref 43–77)
NEUTS BAND # BLD: 0 % — SIGNIFICANT CHANGE UP (ref 0–6)
NITRITE UR-MCNC: NEGATIVE — SIGNIFICANT CHANGE UP
NRBC # BLD: 1 /100WBC — SIGNIFICANT CHANGE UP
NRBC # FLD: 0 K/UL — LOW (ref 25–125)
OTHER - HEMATOLOGY %: 0 — SIGNIFICANT CHANGE UP
PH UR: 6 — SIGNIFICANT CHANGE UP (ref 5–8)
PLATELET # BLD AUTO: 354 K/UL — SIGNIFICANT CHANGE UP (ref 150–400)
PLATELET COUNT - ESTIMATE: NORMAL — SIGNIFICANT CHANGE UP
PMV BLD: 10.1 FL — SIGNIFICANT CHANGE UP (ref 7–13)
POTASSIUM SERPL-MCNC: 4.3 MMOL/L — SIGNIFICANT CHANGE UP (ref 3.5–5.3)
POTASSIUM SERPL-SCNC: 4.3 MMOL/L — SIGNIFICANT CHANGE UP (ref 3.5–5.3)
PROMYELOCYTES # FLD: 0 % — SIGNIFICANT CHANGE UP (ref 0–0)
PROT SERPL-MCNC: 7.2 G/DL — SIGNIFICANT CHANGE UP (ref 6–8.3)
PROT UR-MCNC: 20 — SIGNIFICANT CHANGE UP
RBC # BLD: 4.43 M/UL — SIGNIFICANT CHANGE UP (ref 3.8–5.2)
RBC # FLD: 13 % — SIGNIFICANT CHANGE UP (ref 10.3–14.5)
RBC CASTS # UR COMP ASSIST: HIGH (ref 0–?)
RH IG SCN BLD-IMP: POSITIVE — SIGNIFICANT CHANGE UP
SMUDGE CELLS # BLD: PRESENT — SIGNIFICANT CHANGE UP
SODIUM SERPL-SCNC: 139 MMOL/L — SIGNIFICANT CHANGE UP (ref 135–145)
SP GR SPEC: 1.03 — SIGNIFICANT CHANGE UP (ref 1–1.04)
SQUAMOUS # UR AUTO: SIGNIFICANT CHANGE UP
T3 SERPL-MCNC: 162.5 NG/DL — SIGNIFICANT CHANGE UP (ref 80–200)
T4 AB SER-ACNC: 10.12 UG/DL — SIGNIFICANT CHANGE UP (ref 5.1–13)
TSH SERPL-MCNC: 0.11 UIU/ML — LOW (ref 0.27–4.2)
UROBILINOGEN FLD QL: NORMAL — SIGNIFICANT CHANGE UP
VARIANT LYMPHS # BLD: 9 % — SIGNIFICANT CHANGE UP
WBC # BLD: 3.04 K/UL — LOW (ref 3.8–10.5)
WBC # FLD AUTO: 3.04 K/UL — LOW (ref 3.8–10.5)
WBC UR QL: SIGNIFICANT CHANGE UP (ref 0–?)

## 2019-03-15 PROCEDURE — 99284 EMERGENCY DEPT VISIT MOD MDM: CPT

## 2019-03-15 RX ORDER — SODIUM CHLORIDE 9 MG/ML
1000 INJECTION INTRAMUSCULAR; INTRAVENOUS; SUBCUTANEOUS ONCE
Qty: 0 | Refills: 0 | Status: COMPLETED | OUTPATIENT
Start: 2019-03-15 | End: 2019-03-15

## 2019-03-15 RX ORDER — FAMOTIDINE 10 MG/ML
20 INJECTION INTRAVENOUS ONCE
Qty: 0 | Refills: 0 | Status: COMPLETED | OUTPATIENT
Start: 2019-03-15 | End: 2019-03-15

## 2019-03-15 RX ADMIN — FAMOTIDINE 20 MILLIGRAM(S): 10 INJECTION INTRAVENOUS at 13:52

## 2019-03-15 RX ADMIN — SODIUM CHLORIDE 1000 MILLILITER(S): 9 INJECTION INTRAMUSCULAR; INTRAVENOUS; SUBCUTANEOUS at 13:52

## 2019-03-15 NOTE — ED PROVIDER NOTE - CLINICAL SUMMARY MEDICAL DECISION MAKING FREE TEXT BOX
54F with generalized weakness and fatigue, recent heavy menses. No report of depression. Plan for labs including TSH.

## 2019-03-15 NOTE — ED PROVIDER NOTE - CHPI ED SYMPTOMS NEG
Denies depression, n/v/d, fever, dysuria, CP, SOB, HA, dizziness, or prior hx of similar pain./no nausea/no diarrhea/no dysuria/no fever/no vomiting

## 2019-03-15 NOTE — ED ADULT TRIAGE NOTE - CCCP TRG CHIEF CMPLNT
abdominal pain/feels tired states" I could nt get out of bed" pt arrives A&Ox4 walking without assisst and appears well

## 2019-03-15 NOTE — ED PROVIDER NOTE - PROGRESS NOTE DETAILS
Labs reassuring, mild leukopenia w reactive lymphocytes may be related to viral infection. Labs o/w reassuring.  Plan for discharge home w close follow up. JAY Ron MD

## 2019-03-15 NOTE — ED PROVIDER NOTE - NSFOLLOWUPINSTRUCTIONS_ED_ALL_ED_FT
Take all medications as directed.  For pain take Ibuprofen (Motrin, Advil) 400mg-600mg every 6-8 hours or Acetaminophen (Tylenol) 250mg-650mg every 6-8 hours.  Follow up with your primary physician in 3-4 days. If needed call [phone] to find a primary care physician or call  [phone] to schedule an appointment with the general medicine clinic.   You were given copies of all labs from this ER visit, please take them to your appointment.  Return to the ER for worsening symptoms or any other concerns.

## 2019-03-15 NOTE — ED PROVIDER NOTE - OBJECTIVE STATEMENT
53 y/o F with a PMHx of Hypothyroidism presents to the ED c/o generalized fatigue and abd pain. Pt states over the past 5 days, she had fatigue, difficulties getting out of bed due to general tiredness, malaise, and weakness. Of note, heavy bleeding from menstruation. Last period was ongoing for 2 weeks and ended last week with 10 days of heavy bleeding with clots. She denotes having irregular menstruation since going on menopause. Last night, pt stated she develop intermittent sharp epigastric abd pain. She reports multiple of BM and worsening pain just before BM that then improves. Denies depression, n/v/d, fever, dysuria, CP, SOB, HA, dizziness, or prior hx of similar pain. Pt is complaint with her meds. No other acute complaints at time of eval. 53 y/o F with a PMHx of Hypothyroidism presents to the ED c/o generalized fatigue and abd pain. Pt states over the past 5 days, she had fatigue that has made it difficult to even get out of bed, malaise, and generalized weakness. Recent h/o heavy vaginal bleeding from menstruation. Last period was ongoing for 2 weeks and ended last week with 10 days of heavy bleeding with clots. She denotes having irregular menstruation since going on menopause. Last night, pt stated she develop intermittent sharp epigastric abd pain. She reports multiple of BM and worsening pain just before BM that then improves. Denies depression, n/v/d, fever, dysuria, CP, SOB, HA, dizziness, or prior hx of similar pain. Pt is complaint with her meds. No other acute complaints at time of eval. 55 y/o F with a PMHx of Hypothyroidism presents to the ED c/o generalized fatigue and abd pain. Pt states over the past 5 days, she had fatigue that has made it difficult to even get out of bed, malaise, and generalized weakness. Recent h/o heavy vaginal bleeding from menstruation. Last period was ongoing for 2 weeks and ended last week with 10 days of heavy bleeding with clots. She notes having irregular menstruation since starting menopause. Last night, pt stated she develop intermittent sharp epigastric abd pain. She reports multiple of BM and worsening pain just before BM that then improves. Now with minimal pain. Denies depression, n/v/d, fever, dysuria, CP, SOB, HA, dizziness, or prior hx of similar pain. Pt is complaint with her meds. No other acute complaints at time of eval. 55 y/o F with a PMHx of Hypothyroidism presents to the ED c/o generalized fatigue and abd pain. Pt states over the past 5 days, she had fatigue that has made it difficult to even get out of bed, malaise, and generalized weakness. Recent h/o heavy vaginal bleeding from menstruation. Last period was ongoing for 2 weeks and ended last week with 10 days of heavy bleeding with clots. She notes having irregular menstruation since starting menopause. Last night, pt stated she develop intermittent sharp epigastric abd pain. She reports multiple of BM and worsening pain just before BM that then improves. Now with minimal pain. Denies depression, n/v/d, fever, dysuria, CP, SOB, HA, dizziness, or prior hx of similar pain. Pt is complaint with her meds. Patient reports she has had similar episodes of fatigue previously, several years ago she was evaluated by a rheumatologist without a clear diagnosis.

## 2019-03-15 NOTE — ED PROVIDER NOTE - NS_ ATTENDINGSCRIBEDETAILS _ED_A_ED_FT
The scribe's documentation has been prepared under my direction and personally reviewed by me in its entirety. I confirm that the note above accurately reflects all work, treatment, procedures, and medical decision making performed by me. JAY Ron MD

## 2019-03-18 ENCOUNTER — RESULT REVIEW (OUTPATIENT)
Age: 54
End: 2019-03-18

## 2019-09-30 ENCOUNTER — APPOINTMENT (OUTPATIENT)
Dept: PLASTIC SURGERY | Facility: CLINIC | Age: 54
End: 2019-09-30

## 2019-10-03 ENCOUNTER — APPOINTMENT (OUTPATIENT)
Dept: ORTHOPEDIC SURGERY | Facility: CLINIC | Age: 54
End: 2019-10-03
Payer: COMMERCIAL

## 2019-10-03 VITALS — HEART RATE: 82 BPM | SYSTOLIC BLOOD PRESSURE: 112 MMHG | DIASTOLIC BLOOD PRESSURE: 79 MMHG

## 2019-10-03 VITALS — WEIGHT: 170 LBS | BODY MASS INDEX: 30.12 KG/M2 | HEIGHT: 63 IN

## 2019-10-03 DIAGNOSIS — M54.16 RADICULOPATHY, LUMBAR REGION: ICD-10-CM

## 2019-10-03 DIAGNOSIS — Z82.62 FAMILY HISTORY OF OSTEOPOROSIS: ICD-10-CM

## 2019-10-03 DIAGNOSIS — Z78.9 OTHER SPECIFIED HEALTH STATUS: ICD-10-CM

## 2019-10-03 DIAGNOSIS — Z80.9 FAMILY HISTORY OF MALIGNANT NEOPLASM, UNSPECIFIED: ICD-10-CM

## 2019-10-03 PROCEDURE — 72100 X-RAY EXAM L-S SPINE 2/3 VWS: CPT

## 2019-10-03 PROCEDURE — 99204 OFFICE O/P NEW MOD 45 MIN: CPT

## 2019-10-03 RX ORDER — PREDNISONE 5 MG/1
5 TABLET ORAL
Qty: 30 | Refills: 0 | Status: ACTIVE | COMMUNITY
Start: 2019-10-03 | End: 1900-01-01

## 2019-10-07 ENCOUNTER — FORM ENCOUNTER (OUTPATIENT)
Age: 54
End: 2019-10-07

## 2019-10-08 ENCOUNTER — OUTPATIENT (OUTPATIENT)
Dept: OUTPATIENT SERVICES | Facility: HOSPITAL | Age: 54
LOS: 1 days | End: 2019-10-08
Payer: COMMERCIAL

## 2019-10-08 ENCOUNTER — APPOINTMENT (OUTPATIENT)
Dept: MRI IMAGING | Facility: CLINIC | Age: 54
End: 2019-10-08
Payer: COMMERCIAL

## 2019-10-08 DIAGNOSIS — M54.16 RADICULOPATHY, LUMBAR REGION: ICD-10-CM

## 2019-10-08 PROCEDURE — 72148 MRI LUMBAR SPINE W/O DYE: CPT

## 2019-10-08 PROCEDURE — 72148 MRI LUMBAR SPINE W/O DYE: CPT | Mod: 26

## 2019-10-11 NOTE — PHYSICAL EXAM
[de-identified] : Constitutional: Well-nourished, well-developed, No acute distress\par Respiratory:  Good respiratory effort, no SOB\par Lymphatic: No regional lymphadenopathy, no lymphedema\par Psychiatric: Pleasant and normal affect, alert and oriented x3\par Skin: Clean dry and intact B/L UE/LE\par Musculoskeletal: normal except where as noted in regional exam\par \par Lumbar Spine Exam\par \par APPEARANCE: no marked deformities or malalignment, + loss of lordotic curvature of the lumbosacral spine. no marked deformities. + poor posture\par POSITIVE TENDERNESS: + IL/SI/ST ligs, + TTP of b/l SI joints, + b/l lower lumbar tenderness and spasm noted in erector spinae and quadratus lumborum\par NONTENDER: no bony midline tenderness.\par ROM: Mildly limited in all directions, mildly painful at end range of flexion and extension\par RESISTIVE TESTING: painless 5/5 resisted flex/ext, sidebending b/l, and rotation\par SPECIAL TESTS: neg SLR b/l, neg TIFFANIE b/l, neg Trendelenburg b/l \par PULSES: 2+ DP/PT pulses\par NEURO:  L1 - S2 intact to sensation and motor, DTRs 2+/4 patella and achilles\par \par B/L Hips: No asymmetry, malalignment, or swelling, Full ROM, 5/5 strength in flexion/ext, IR/ER, Abd/Add, Joints stable\par B/L Knees: No asymmetry, malalignment, or swelling, Full ROM, 5/5 strength in flexion/ext, Joints stable\par B/L Ankles: No asymmetry, malalignment, or swelling, Full ROM, 5/5 strength in DF/PF/Inv/Ev, Joints stable\par \par  [de-identified] : The following radiographs were ordered and read by me during this patient's visit. I reviewed each radiograph in detail with the patient and discussed the findings as highlighted below. \par \par 2 views of the lumbar spine were obtained today that show no fracture, or dislocation. There are no degenerative changes seen. There is no malalignment. No obvious osseous abnormality. Otherwise unremarkable.\par \par

## 2019-10-11 NOTE — DISCUSSION/SUMMARY
[de-identified] : Discussed findings of today's exam and possible causes of patient's pain.  Educated patient on their most probable diagnosis of acute on chronic low back pain, with symptomatic radiculopathy.  Reviewed possible courses of treatment, and we collaboratively decided best course of treatment at this time will include conservative management. The patient significant pain and limited function recommend an oral prednisone taper to address acute inflammation.  Patient will be started on a course of physical therapy to restore normal range of motion and strength as tolerated. I also recommend advanced imaging with MRI to evaluate for stenosis/herniation, or possibly ankylosing spondylitis.  Patient will follow up when MRI results are available.  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 Patient appreciates and agrees with current plan.\par \par This note was generated using dragon medical dictation software.  A reasonable effort has been made for proofreading its contents, but typos may still remain.  If there are any questions or points of clarification needed please notify my office.
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
## 2020-02-03 ENCOUNTER — APPOINTMENT (OUTPATIENT)
Dept: PLASTIC SURGERY | Facility: CLINIC | Age: 55
End: 2020-02-03
Payer: COMMERCIAL

## 2020-02-03 VITALS — HEIGHT: 62.5 IN | BODY MASS INDEX: 31.22 KG/M2 | WEIGHT: 174 LBS

## 2020-02-03 DIAGNOSIS — E65 LOCALIZED ADIPOSITY: ICD-10-CM

## 2020-02-03 PROCEDURE — 99212 OFFICE O/P EST SF 10 MIN: CPT

## 2020-02-04 PROBLEM — E65 ABDOMINAL PANNUS: Status: ACTIVE | Noted: 2019-02-01

## 2020-02-04 NOTE — PHYSICAL EXAM
[de-identified] : breast ptosis is present; there is no open wound, palpable mass, or erythema; breast asymmetry is present [de-identified] : there is supraumbilical fullness; abdominal pannus with skin excess is present

## 2020-02-04 NOTE — REASON FOR VISIT
[Follow-Up: _____] : a [unfilled] follow-up visit [FreeTextEntry1] : patient presents to further discuss abdominoplasty

## 2020-02-04 NOTE — HISTORY OF PRESENT ILLNESS
[FreeTextEntry1] : 55 yo female is interested in abdominoplasty and presents for further discussion.  She states she has lost weight, approximately 20 lbs.   She states she would like to lose an additional 10-15lbs.

## 2020-03-18 ENCOUNTER — APPOINTMENT (OUTPATIENT)
Dept: SURGICAL ONCOLOGY | Facility: CLINIC | Age: 55
End: 2020-03-18

## 2020-12-28 ENCOUNTER — OUTPATIENT (OUTPATIENT)
Dept: OUTPATIENT SERVICES | Facility: HOSPITAL | Age: 55
LOS: 1 days | End: 2020-12-28
Payer: COMMERCIAL

## 2020-12-28 VITALS
HEIGHT: 63 IN | TEMPERATURE: 99 F | DIASTOLIC BLOOD PRESSURE: 90 MMHG | SYSTOLIC BLOOD PRESSURE: 118 MMHG | OXYGEN SATURATION: 97 % | WEIGHT: 181 LBS | RESPIRATION RATE: 20 BRPM | HEART RATE: 120 BPM

## 2020-12-28 DIAGNOSIS — N92.4 EXCESSIVE BLEEDING IN THE PREMENOPAUSAL PERIOD: ICD-10-CM

## 2020-12-28 DIAGNOSIS — N93.9 ABNORMAL UTERINE AND VAGINAL BLEEDING, UNSPECIFIED: ICD-10-CM

## 2020-12-28 DIAGNOSIS — N92.1 EXCESSIVE AND FREQUENT MENSTRUATION WITH IRREGULAR CYCLE: ICD-10-CM

## 2020-12-28 DIAGNOSIS — Z98.890 OTHER SPECIFIED POSTPROCEDURAL STATES: Chronic | ICD-10-CM

## 2020-12-28 DIAGNOSIS — Z90.49 ACQUIRED ABSENCE OF OTHER SPECIFIED PARTS OF DIGESTIVE TRACT: Chronic | ICD-10-CM

## 2020-12-28 DIAGNOSIS — R10.2 PELVIC AND PERINEAL PAIN: ICD-10-CM

## 2020-12-28 DIAGNOSIS — Z01.818 ENCOUNTER FOR OTHER PREPROCEDURAL EXAMINATION: ICD-10-CM

## 2020-12-28 DIAGNOSIS — D25.9 LEIOMYOMA OF UTERUS, UNSPECIFIED: ICD-10-CM

## 2020-12-28 LAB
A1C WITH ESTIMATED AVERAGE GLUCOSE RESULT: 5.2 % — SIGNIFICANT CHANGE UP (ref 4–5.6)
ANION GAP SERPL CALC-SCNC: 13 MMOL/L — SIGNIFICANT CHANGE UP (ref 5–17)
BUN SERPL-MCNC: 13 MG/DL — SIGNIFICANT CHANGE UP (ref 7–23)
CALCIUM SERPL-MCNC: 9.6 MG/DL — SIGNIFICANT CHANGE UP (ref 8.4–10.5)
CHLORIDE SERPL-SCNC: 102 MMOL/L — SIGNIFICANT CHANGE UP (ref 96–108)
CO2 SERPL-SCNC: 24 MMOL/L — SIGNIFICANT CHANGE UP (ref 22–31)
CREAT SERPL-MCNC: 0.74 MG/DL — SIGNIFICANT CHANGE UP (ref 0.5–1.3)
ESTIMATED AVERAGE GLUCOSE: 103 MG/DL — SIGNIFICANT CHANGE UP (ref 68–114)
GLUCOSE SERPL-MCNC: 93 MG/DL — SIGNIFICANT CHANGE UP (ref 70–99)
HCT VFR BLD CALC: 40.8 % — SIGNIFICANT CHANGE UP (ref 34.5–45)
HGB BLD-MCNC: 13.3 G/DL — SIGNIFICANT CHANGE UP (ref 11.5–15.5)
MCHC RBC-ENTMCNC: 29 PG — SIGNIFICANT CHANGE UP (ref 27–34)
MCHC RBC-ENTMCNC: 32.6 GM/DL — SIGNIFICANT CHANGE UP (ref 32–36)
MCV RBC AUTO: 88.9 FL — SIGNIFICANT CHANGE UP (ref 80–100)
NRBC # BLD: 0 /100 WBCS — SIGNIFICANT CHANGE UP (ref 0–0)
PLATELET # BLD AUTO: 318 K/UL — SIGNIFICANT CHANGE UP (ref 150–400)
POTASSIUM SERPL-MCNC: 4.2 MMOL/L — SIGNIFICANT CHANGE UP (ref 3.5–5.3)
POTASSIUM SERPL-SCNC: 4.2 MMOL/L — SIGNIFICANT CHANGE UP (ref 3.5–5.3)
RBC # BLD: 4.59 M/UL — SIGNIFICANT CHANGE UP (ref 3.8–5.2)
RBC # FLD: 14.2 % — SIGNIFICANT CHANGE UP (ref 10.3–14.5)
SODIUM SERPL-SCNC: 139 MMOL/L — SIGNIFICANT CHANGE UP (ref 135–145)
WBC # BLD: 3.17 K/UL — LOW (ref 3.8–10.5)
WBC # FLD AUTO: 3.17 K/UL — LOW (ref 3.8–10.5)

## 2020-12-28 PROCEDURE — 85027 COMPLETE CBC AUTOMATED: CPT

## 2020-12-28 PROCEDURE — 83036 HEMOGLOBIN GLYCOSYLATED A1C: CPT

## 2020-12-28 PROCEDURE — G0463: CPT

## 2020-12-28 PROCEDURE — 80048 BASIC METABOLIC PNL TOTAL CA: CPT

## 2020-12-28 PROCEDURE — 87086 URINE CULTURE/COLONY COUNT: CPT

## 2020-12-28 RX ORDER — SODIUM CHLORIDE 9 MG/ML
3 INJECTION INTRAMUSCULAR; INTRAVENOUS; SUBCUTANEOUS EVERY 8 HOURS
Refills: 0 | Status: DISCONTINUED | OUTPATIENT
Start: 2021-01-11 | End: 2021-01-25

## 2020-12-28 RX ORDER — BUPROPION HYDROCHLORIDE 150 MG/1
1 TABLET, EXTENDED RELEASE ORAL
Qty: 0 | Refills: 0 | DISCHARGE

## 2020-12-28 RX ORDER — LIDOCAINE HCL 20 MG/ML
0.2 VIAL (ML) INJECTION ONCE
Refills: 0 | Status: DISCONTINUED | OUTPATIENT
Start: 2021-01-11 | End: 2021-01-25

## 2020-12-28 NOTE — H&P PST ADULT - ATTENDING COMMENTS
I consented the patient for the proposed procedure including for a pelvic examination under anesthesia by myself and my assistants in the room.

## 2020-12-28 NOTE — H&P PST ADULT - NSICDXPASTSURGICALHX_GEN_ALL_CORE_FT
PAST SURGICAL HISTORY:  S/P dilatation and curettage x 1 - missed     S/P laparoscopic cholecystectomy     S/P laparoscopic surgery for endometriosis    S/P LASIK (laser assisted in situ keratomileusis) of both eyes

## 2020-12-28 NOTE — H&P PST ADULT - NSANTHOSAYNRD_GEN_A_CORE
No. SULTANA screening performed.  STOP BANG Legend: 0-2 = LOW Risk; 3-4 = INTERMEDIATE Risk; 5-8 = HIGH Risk

## 2020-12-28 NOTE — H&P PST ADULT - NSICDXPASTMEDICALHX_GEN_ALL_CORE_FT
PAST MEDICAL HISTORY:  Anxiety and depression     Endometriosis     H/O gastritis     History of diverticulitis     History of umbilical hernia     Hypothyroidism     IBS (irritable bowel syndrome)     Kidney stones     Uterine fibroid     Uterine polyp

## 2020-12-28 NOTE — H&P PST ADULT - HISTORY OF PRESENT ILLNESS
55 year old female with history of endometriosis       covid test - 1/8/2021- [facility]  55 year old female with history of endometriosis , Hypothyroid, depression & anxiety, with menorrhagia lasting for 2o days for past 8 mths, Pt c/o fatigue . Now coming in for Laparoscopic vaginal hysterectomy, Laparoscopic bilateral salpingectomy , Possible total laparoscopic hysterectomy, Cystourethroscopy on 1/11/2021.       covid test - 1/8/2021- [facility]

## 2020-12-28 NOTE — H&P PST ADULT - NSICDXPROBLEM_GEN_ALL_CORE_FT
PROBLEM DIAGNOSES  Problem: Uterine bleeding  Assessment and Plan:        PROBLEM DIAGNOSES  Problem: Uterine bleeding  Assessment and Plan: Laparoscopic vaginal hysterectomy, Laparoscopic bilateral salpingectomy , Possible total laparoscopic hysterectomy, Cystourethroscopy on 1/11/2021.

## 2020-12-29 LAB
CULTURE RESULTS: SIGNIFICANT CHANGE UP
SPECIMEN SOURCE: SIGNIFICANT CHANGE UP

## 2020-12-29 RX ORDER — ACETAMINOPHEN 500 MG
1000 TABLET ORAL ONCE
Refills: 0 | Status: DISCONTINUED | OUTPATIENT
Start: 2021-01-11 | End: 2021-01-25

## 2021-01-05 PROBLEM — K58.9 IRRITABLE BOWEL SYNDROME WITHOUT DIARRHEA: Chronic | Status: ACTIVE | Noted: 2020-12-28

## 2021-01-05 PROBLEM — Z87.19 PERSONAL HISTORY OF OTHER DISEASES OF THE DIGESTIVE SYSTEM: Chronic | Status: ACTIVE | Noted: 2020-12-28

## 2021-01-05 PROBLEM — N20.0 CALCULUS OF KIDNEY: Chronic | Status: ACTIVE | Noted: 2020-12-28

## 2021-01-05 PROBLEM — F41.9 ANXIETY DISORDER, UNSPECIFIED: Chronic | Status: ACTIVE | Noted: 2020-12-28

## 2021-01-05 PROBLEM — K58.9 IRRITABLE BOWEL SYNDROME, UNSPECIFIED: Chronic | Status: ACTIVE | Noted: 2020-12-28

## 2021-01-05 PROBLEM — D25.9 LEIOMYOMA OF UTERUS, UNSPECIFIED: Chronic | Status: ACTIVE | Noted: 2020-12-28

## 2021-01-08 ENCOUNTER — OUTPATIENT (OUTPATIENT)
Dept: OUTPATIENT SERVICES | Facility: HOSPITAL | Age: 56
LOS: 1 days | End: 2021-01-08
Payer: COMMERCIAL

## 2021-01-08 DIAGNOSIS — Z90.49 ACQUIRED ABSENCE OF OTHER SPECIFIED PARTS OF DIGESTIVE TRACT: Chronic | ICD-10-CM

## 2021-01-08 DIAGNOSIS — Z98.890 OTHER SPECIFIED POSTPROCEDURAL STATES: Chronic | ICD-10-CM

## 2021-01-08 DIAGNOSIS — Z20.828 CONTACT WITH AND (SUSPECTED) EXPOSURE TO OTHER VIRAL COMMUNICABLE DISEASES: ICD-10-CM

## 2021-01-08 LAB — SARS-COV-2 RNA SPEC QL NAA+PROBE: SIGNIFICANT CHANGE UP

## 2021-01-08 PROCEDURE — C9803: CPT

## 2021-01-08 PROCEDURE — U0005: CPT

## 2021-01-08 PROCEDURE — U0003: CPT

## 2021-01-10 ENCOUNTER — TRANSCRIPTION ENCOUNTER (OUTPATIENT)
Age: 56
End: 2021-01-10

## 2021-01-10 RX ORDER — SIMETHICONE 80 MG/1
80 TABLET, CHEWABLE ORAL EVERY 6 HOURS
Refills: 0 | Status: DISCONTINUED | OUTPATIENT
Start: 2021-01-11 | End: 2021-01-25

## 2021-01-10 RX ORDER — ONDANSETRON 8 MG/1
8 TABLET, FILM COATED ORAL EVERY 8 HOURS
Refills: 0 | Status: DISCONTINUED | OUTPATIENT
Start: 2021-01-11 | End: 2021-01-25

## 2021-01-10 RX ORDER — ACETAMINOPHEN 500 MG
1000 TABLET ORAL EVERY 6 HOURS
Refills: 0 | Status: DISCONTINUED | OUTPATIENT
Start: 2021-01-11 | End: 2021-01-25

## 2021-01-10 RX ORDER — OXYCODONE HYDROCHLORIDE 5 MG/1
5 TABLET ORAL EVERY 4 HOURS
Refills: 0 | Status: DISCONTINUED | OUTPATIENT
Start: 2021-01-11 | End: 2021-01-11

## 2021-01-10 RX ORDER — OXYCODONE HYDROCHLORIDE 5 MG/1
10 TABLET ORAL EVERY 4 HOURS
Refills: 0 | Status: DISCONTINUED | OUTPATIENT
Start: 2021-01-11 | End: 2021-01-11

## 2021-01-11 ENCOUNTER — OUTPATIENT (OUTPATIENT)
Dept: OUTPATIENT SERVICES | Facility: HOSPITAL | Age: 56
LOS: 1 days | End: 2021-01-11
Payer: COMMERCIAL

## 2021-01-11 ENCOUNTER — RESULT REVIEW (OUTPATIENT)
Age: 56
End: 2021-01-11

## 2021-01-11 VITALS
TEMPERATURE: 98 F | DIASTOLIC BLOOD PRESSURE: 77 MMHG | SYSTOLIC BLOOD PRESSURE: 121 MMHG | WEIGHT: 181 LBS | HEART RATE: 86 BPM | HEIGHT: 63 IN | RESPIRATION RATE: 16 BRPM | OXYGEN SATURATION: 99 %

## 2021-01-11 VITALS
DIASTOLIC BLOOD PRESSURE: 64 MMHG | TEMPERATURE: 98 F | OXYGEN SATURATION: 99 % | HEART RATE: 84 BPM | SYSTOLIC BLOOD PRESSURE: 105 MMHG

## 2021-01-11 DIAGNOSIS — N93.9 ABNORMAL UTERINE AND VAGINAL BLEEDING, UNSPECIFIED: ICD-10-CM

## 2021-01-11 DIAGNOSIS — Z98.890 OTHER SPECIFIED POSTPROCEDURAL STATES: Chronic | ICD-10-CM

## 2021-01-11 DIAGNOSIS — N92.4 EXCESSIVE BLEEDING IN THE PREMENOPAUSAL PERIOD: ICD-10-CM

## 2021-01-11 DIAGNOSIS — D25.9 LEIOMYOMA OF UTERUS, UNSPECIFIED: ICD-10-CM

## 2021-01-11 DIAGNOSIS — N92.1 EXCESSIVE AND FREQUENT MENSTRUATION WITH IRREGULAR CYCLE: ICD-10-CM

## 2021-01-11 DIAGNOSIS — R10.2 PELVIC AND PERINEAL PAIN: ICD-10-CM

## 2021-01-11 DIAGNOSIS — Z90.49 ACQUIRED ABSENCE OF OTHER SPECIFIED PARTS OF DIGESTIVE TRACT: Chronic | ICD-10-CM

## 2021-01-11 LAB
HCT VFR BLD CALC: 34.7 % — SIGNIFICANT CHANGE UP (ref 34.5–45)
HGB BLD-MCNC: 11.3 G/DL — LOW (ref 11.5–15.5)

## 2021-01-11 PROCEDURE — C9399: CPT

## 2021-01-11 PROCEDURE — 88302 TISSUE EXAM BY PATHOLOGIST: CPT | Mod: 26

## 2021-01-11 PROCEDURE — 88307 TISSUE EXAM BY PATHOLOGIST: CPT

## 2021-01-11 PROCEDURE — 88307 TISSUE EXAM BY PATHOLOGIST: CPT | Mod: 26

## 2021-01-11 PROCEDURE — 88302 TISSUE EXAM BY PATHOLOGIST: CPT

## 2021-01-11 PROCEDURE — 82962 GLUCOSE BLOOD TEST: CPT

## 2021-01-11 PROCEDURE — 58552 LAPARO-VAG HYST INCL T/O: CPT

## 2021-01-11 PROCEDURE — 85014 HEMATOCRIT: CPT

## 2021-01-11 PROCEDURE — 85018 HEMOGLOBIN: CPT

## 2021-01-11 RX ORDER — HYOSCYAMINE SULFATE 0.13 MG
1 TABLET ORAL
Qty: 0 | Refills: 0 | DISCHARGE

## 2021-01-11 RX ORDER — LEVOTHYROXINE SODIUM 125 MCG
1 TABLET ORAL
Qty: 0 | Refills: 0 | DISCHARGE

## 2021-01-11 RX ORDER — CHLORHEXIDINE GLUCONATE 213 G/1000ML
1 SOLUTION TOPICAL ONCE
Refills: 0 | Status: COMPLETED | OUTPATIENT
Start: 2021-01-11 | End: 2021-01-11

## 2021-01-11 RX ORDER — PHENTERMINE HCL 30 MG
1 CAPSULE ORAL
Qty: 0 | Refills: 0 | DISCHARGE

## 2021-01-11 RX ORDER — CEFOTETAN DISODIUM 1 G
2 VIAL (EA) INJECTION ONCE
Refills: 0 | Status: COMPLETED | OUTPATIENT
Start: 2021-01-11 | End: 2021-01-11

## 2021-01-11 RX ORDER — OMEPRAZOLE 10 MG/1
1 CAPSULE, DELAYED RELEASE ORAL
Qty: 0 | Refills: 0 | DISCHARGE

## 2021-01-11 RX ORDER — LIOTHYRONINE SODIUM 25 UG/1
2 TABLET ORAL
Qty: 0 | Refills: 0 | DISCHARGE

## 2021-01-11 RX ORDER — ACETAMINOPHEN 500 MG
975 TABLET ORAL EVERY 6 HOURS
Refills: 0 | Status: DISCONTINUED | OUTPATIENT
Start: 2021-01-11 | End: 2021-01-25

## 2021-01-11 RX ORDER — CELECOXIB 200 MG/1
400 CAPSULE ORAL ONCE
Refills: 0 | Status: COMPLETED | OUTPATIENT
Start: 2021-01-11 | End: 2021-01-11

## 2021-01-11 RX ORDER — OXYCODONE HYDROCHLORIDE 5 MG/1
5 TABLET ORAL EVERY 4 HOURS
Refills: 0 | Status: DISCONTINUED | OUTPATIENT
Start: 2021-01-11 | End: 2021-01-11

## 2021-01-11 RX ORDER — BUPROPION HYDROCHLORIDE 150 MG/1
3 TABLET, EXTENDED RELEASE ORAL
Qty: 0 | Refills: 0 | DISCHARGE

## 2021-01-11 RX ORDER — GABAPENTIN 400 MG/1
600 CAPSULE ORAL ONCE
Refills: 0 | Status: COMPLETED | OUTPATIENT
Start: 2021-01-11 | End: 2021-01-11

## 2021-01-11 RX ORDER — IBUPROFEN 200 MG
600 TABLET ORAL EVERY 6 HOURS
Refills: 0 | Status: DISCONTINUED | OUTPATIENT
Start: 2021-01-11 | End: 2021-01-25

## 2021-01-11 RX ADMIN — CELECOXIB 400 MILLIGRAM(S): 200 CAPSULE ORAL at 06:51

## 2021-01-11 RX ADMIN — Medication 1000 MILLIGRAM(S): at 06:51

## 2021-01-11 RX ADMIN — GABAPENTIN 600 MILLIGRAM(S): 400 CAPSULE ORAL at 06:52

## 2021-01-11 NOTE — ASU PATIENT PROFILE, ADULT - PMH
Anxiety and depression    Endometriosis    H/O gastritis    History of diverticulitis    History of umbilical hernia    Hypothyroidism    IBS (irritable bowel syndrome)    Kidney stones    Uterine fibroid    Uterine polyp

## 2021-01-11 NOTE — ASU DISCHARGE PLAN (ADULT/PEDIATRIC) - CARE PROVIDER_API CALL
Linda Mckenzie)  Obstetrics and Gynecology  [address], [address]  [place] [address]  Phone: [phone]  Fax: [phone]  Follow Up Time:

## 2021-01-11 NOTE — ASU DISCHARGE PLAN (ADULT/PEDIATRIC) - ACTIVITY LEVEL
8 weeks/No heavy lifting/No weight bearing/Nothing per vagina/No tub baths/No douching/No tampons/No intercourse

## 2021-01-11 NOTE — CHART NOTE - NSCHARTNOTEFT_GEN_A_CORE
R1 OBGYN POST-OP CHECK    S: Patient seen and evaluated at bedside.  Pt sleeping, easily arousable. Patient reports pain controlled with analgesia. Pt denies N/V, SOB, CP, palpitations, fever/chills. Has not yet attempted PO intake.  Not OOB yet.    O:   T(C): 36.8 (01-11-21 @ 11:45), Max: 36.8 (01-11-21 @ 11:45)  HR: 87 (01-11-21 @ 14:15) (87 - 93)  BP: 101/58 (01-11-21 @ 14:15) (89/56 - 118/69)  RR: 14 (01-11-21 @ 14:15) (10 - 14)  SpO2: 94% (01-11-21 @ 14:15) (94% - 99%)  Wt(kg): --  I&O's Summary    11 Jan 2021 07:01  -  11 Jan 2021 14:38  --------------------------------------------------------  IN: 700 mL / OUT: 0 mL / NET: 700 mL        Gen: Resting comfortably in bed, NAD  CV: S1S2, RRR  Lungs: CTA B/L  Abd: soft, appropriately tender, occasional BS x 4 quadrants.    Inc: Umbilical incision c/d/i  : scant blood on pad  Ext: SCD's in place and functional, non-tender b/l, no edema    Labs:                11.3   x     )-----------( x        ( 01-11 @ 12:37 )             34.7           A/P: 55y Female s/p LAVH, BS, cysto seen in PACU. Patient doing well postoperatively.     Neuro: PO Analgesia PRN   CV: Hemodynamically stable.  Monitor VS. Post-op H/H 11.3/34.7  Pulm: Saturating well on room air.  Encourage OOB and incentive spirometer use.   GI: Advance diet as tolerated. Anti-emetics PRN.  : DTV@530pm. Bladder scan following 1st void. If PVR>150cc, will repeat after next void with goal PVR<150cc  Electrolytes: LR@125cc/hr.   Heme: DVT ppx w/ SCD's while in bed. Early ambulation, initially with assistance then as tolerated.   ID: Afebrile  Endo: No active issues   Dispo: Discharge from PACU when criteria met.     Dev, PGY1

## 2021-01-11 NOTE — ASU DISCHARGE PLAN (ADULT/PEDIATRIC) - ASU DC SPECIAL INSTRUCTIONSFT
Discharge Instructions:  1. Diet: advance as tolerated  2. Activity limited by:   - no running, heavy lifting, strenuous exercise,   - nothing in vagina (bath, swim, intercourse, douching, tampons) for 2 weeks  3. Medications:   - Senna to prevent constipation (please hold for loose stools)  - Ibuprofen 600mg every 6 hours as needed for pain  - Acetaminophen 500mg every 4 hours as needed for pain  - Oxycodone 5mg every 4 hours for severe pain   4. Follow-up appointment - 2 weeks. Please call the office upon discharge to schedule your appointment if you do not have one already.   5. Precautions:  - Call the office or go to the ED if you have any of the followin) Fever >100.4 that does not resolve  2) Intractable pain  3) Heavy bleeding

## 2021-01-11 NOTE — PRE-ANESTHESIA EVALUATION ADULT - MALLAMPATI CLASS
31 yo  at 32w5d here for routine visit.  No problems.   by phone.  RTC 2 weeks.  
Class IV (difficult) - the soft palate is not visible at all

## 2021-01-11 NOTE — ASU PATIENT PROFILE, ADULT - PSH
S/P dilatation and curettage  x 1 - missed   S/P laparoscopic cholecystectomy    S/P laparoscopic surgery  for endometriosis  S/P LASIK (laser assisted in situ keratomileusis) of both eyes

## 2021-01-11 NOTE — BRIEF OPERATIVE NOTE - NSICDXBRIEFPROCEDURE_GEN_ALL_CORE_FT
PROCEDURES:  Laparoscopic assisted vaginal hysterectomy for uterus 250 grams or less 11-Jan-2021 12:01:33  Linda Mckenzie

## 2021-01-11 NOTE — PACU DISCHARGE NOTE - COMMENTS
Patient voided twice, residual >200ml.  She was discharged home with Guerrero, according to Dr. Mckenzie's instructions.

## 2021-02-23 NOTE — PHYSICAL EXAM
[NI] : Normal [de-identified] : sternal notch to nipple on the left is 26.5 cm and 26 cm on the right.  The nipple to IMF on the left is 10 cm and 11 cm on the right.  The base width is 11 cm bilaterally.  There is grade 2 ptosis bilaterally.  There is breast asymmetry, the right breast is lower than the left breast.  There are no open wounds or palpable masses of either breast. [de-identified] : multiple laparoscopic port site scars, umbilical bulge/possible hernia Burow's Graft Text: The defect edges were debeveled with a #15 scalpel blade.  Given the location of the defect, shape of the defect, the proximity to free margins and the presence of a standing cone deformity a Burow's skin graft was deemed most appropriate. The standing cone was removed and this tissue was then trimmed to the shape of the primary defect. The adipose tissue was also removed until only dermis and epidermis were left.  The skin margins of the secondary defect were undermined to an appropriate distance in all directions utilizing iris scissors.  The secondary defect was closed with interrupted buried subcutaneous sutures.  The skin edges were then re-apposed with running  sutures.  The skin graft was then placed in the primary defect and oriented appropriately.

## 2021-08-20 ENCOUNTER — OUTPATIENT (OUTPATIENT)
Dept: OUTPATIENT SERVICES | Facility: HOSPITAL | Age: 56
LOS: 1 days | End: 2021-08-20
Payer: COMMERCIAL

## 2021-08-20 ENCOUNTER — APPOINTMENT (OUTPATIENT)
Dept: MAMMOGRAPHY | Facility: IMAGING CENTER | Age: 56
End: 2021-08-20
Payer: COMMERCIAL

## 2021-08-20 ENCOUNTER — APPOINTMENT (OUTPATIENT)
Dept: ULTRASOUND IMAGING | Facility: IMAGING CENTER | Age: 56
End: 2021-08-20
Payer: COMMERCIAL

## 2021-08-20 DIAGNOSIS — R92.2 INCONCLUSIVE MAMMOGRAM: ICD-10-CM

## 2021-08-20 DIAGNOSIS — Z98.890 OTHER SPECIFIED POSTPROCEDURAL STATES: Chronic | ICD-10-CM

## 2021-08-20 DIAGNOSIS — Z90.49 ACQUIRED ABSENCE OF OTHER SPECIFIED PARTS OF DIGESTIVE TRACT: Chronic | ICD-10-CM

## 2021-08-20 DIAGNOSIS — Z12.31 ENCOUNTER FOR SCREENING MAMMOGRAM FOR MALIGNANT NEOPLASM OF BREAST: ICD-10-CM

## 2021-08-20 PROCEDURE — 77067 SCR MAMMO BI INCL CAD: CPT | Mod: 26

## 2021-08-20 PROCEDURE — 77063 BREAST TOMOSYNTHESIS BI: CPT

## 2021-08-20 PROCEDURE — 76641 ULTRASOUND BREAST COMPLETE: CPT | Mod: 26,50

## 2021-08-20 PROCEDURE — 76641 ULTRASOUND BREAST COMPLETE: CPT

## 2021-08-20 PROCEDURE — 77063 BREAST TOMOSYNTHESIS BI: CPT | Mod: 26

## 2021-08-20 PROCEDURE — 77067 SCR MAMMO BI INCL CAD: CPT

## 2021-08-30 ENCOUNTER — TRANSCRIPTION ENCOUNTER (OUTPATIENT)
Age: 56
End: 2021-08-30

## 2021-08-30 VITALS
TEMPERATURE: 98 F | SYSTOLIC BLOOD PRESSURE: 118 MMHG | HEART RATE: 92 BPM | WEIGHT: 155.21 LBS | DIASTOLIC BLOOD PRESSURE: 81 MMHG | HEIGHT: 63 IN | OXYGEN SATURATION: 99 % | RESPIRATION RATE: 18 BRPM

## 2021-08-30 NOTE — ASU PREOP CHECKLIST - 2.
pt went to [facility] lab at 3:30 pm, triage line contacted, Leonel said that he can't expedite test outside of [facility].

## 2021-08-30 NOTE — ASU PREOP CHECKLIST - 1.
pt doesn't have a COVId test, Office of Dr Joseline romeo, Marjan coordinator informed, and will give pt all options today at [facility], [facility] tomorrow am before surgery. Pt has to arrived 9am

## 2021-08-31 ENCOUNTER — RESULT REVIEW (OUTPATIENT)
Age: 56
End: 2021-08-31

## 2021-08-31 ENCOUNTER — INPATIENT (INPATIENT)
Facility: HOSPITAL | Age: 56
LOS: 0 days | Discharge: HOME | DRG: 355 | End: 2021-09-01
Attending: PLASTIC SURGERY | Admitting: PLASTIC SURGERY
Payer: COMMERCIAL

## 2021-08-31 DIAGNOSIS — Z98.890 OTHER SPECIFIED POSTPROCEDURAL STATES: Chronic | ICD-10-CM

## 2021-08-31 DIAGNOSIS — Z90.49 ACQUIRED ABSENCE OF OTHER SPECIFIED PARTS OF DIGESTIVE TRACT: Chronic | ICD-10-CM

## 2021-08-31 PROCEDURE — 88302 TISSUE EXAM BY PATHOLOGIST: CPT | Mod: 26

## 2021-08-31 RX ORDER — HYDROMORPHONE HYDROCHLORIDE 2 MG/ML
0.5 INJECTION INTRAMUSCULAR; INTRAVENOUS; SUBCUTANEOUS
Refills: 0 | Status: DISCONTINUED | OUTPATIENT
Start: 2021-08-31 | End: 2021-09-01

## 2021-08-31 RX ORDER — BUPIVACAINE 13.3 MG/ML
20 INJECTION, SUSPENSION, LIPOSOMAL INFILTRATION ONCE
Refills: 0 | Status: DISCONTINUED | OUTPATIENT
Start: 2021-08-31 | End: 2021-09-01

## 2021-08-31 RX ORDER — ACETAMINOPHEN 500 MG
975 TABLET ORAL EVERY 6 HOURS
Refills: 0 | Status: DISCONTINUED | OUTPATIENT
Start: 2021-08-31 | End: 2021-09-01

## 2021-08-31 RX ORDER — ONDANSETRON 8 MG/1
4 TABLET, FILM COATED ORAL EVERY 6 HOURS
Refills: 0 | Status: DISCONTINUED | OUTPATIENT
Start: 2021-08-31 | End: 2021-09-01

## 2021-08-31 RX ORDER — BENZOCAINE AND MENTHOL 5; 1 G/100ML; G/100ML
1 LIQUID ORAL
Refills: 0 | Status: DISCONTINUED | OUTPATIENT
Start: 2021-08-31 | End: 2021-09-01

## 2021-08-31 RX ORDER — SODIUM CHLORIDE 9 MG/ML
1000 INJECTION, SOLUTION INTRAVENOUS
Refills: 0 | Status: DISCONTINUED | OUTPATIENT
Start: 2021-08-31 | End: 2021-09-01

## 2021-08-31 RX ORDER — CEFAZOLIN SODIUM 1 G
2000 VIAL (EA) INJECTION EVERY 8 HOURS
Refills: 0 | Status: COMPLETED | OUTPATIENT
Start: 2021-08-31 | End: 2021-09-01

## 2021-08-31 RX ORDER — OXYCODONE HYDROCHLORIDE 5 MG/1
5 TABLET ORAL EVERY 4 HOURS
Refills: 0 | Status: DISCONTINUED | OUTPATIENT
Start: 2021-08-31 | End: 2021-09-01

## 2021-08-31 RX ORDER — ENOXAPARIN SODIUM 100 MG/ML
40 INJECTION SUBCUTANEOUS EVERY 24 HOURS
Refills: 0 | Status: DISCONTINUED | OUTPATIENT
Start: 2021-08-31 | End: 2021-09-01

## 2021-08-31 RX ORDER — OXYCODONE HYDROCHLORIDE 5 MG/1
10 TABLET ORAL EVERY 4 HOURS
Refills: 0 | Status: DISCONTINUED | OUTPATIENT
Start: 2021-08-31 | End: 2021-09-01

## 2021-08-31 RX ORDER — METOCLOPRAMIDE HCL 10 MG
10 TABLET ORAL EVERY 6 HOURS
Refills: 0 | Status: DISCONTINUED | OUTPATIENT
Start: 2021-08-31 | End: 2021-09-01

## 2021-08-31 RX ORDER — BENZOCAINE AND MENTHOL 5; 1 G/100ML; G/100ML
1 LIQUID ORAL
Refills: 0 | Status: DISCONTINUED | OUTPATIENT
Start: 2021-08-31 | End: 2021-08-31

## 2021-08-31 RX ORDER — DIAZEPAM 5 MG
5 TABLET ORAL EVERY 8 HOURS
Refills: 0 | Status: DISCONTINUED | OUTPATIENT
Start: 2021-08-31 | End: 2021-09-01

## 2021-08-31 RX ADMIN — HYDROMORPHONE HYDROCHLORIDE 0.5 MILLIGRAM(S): 2 INJECTION INTRAMUSCULAR; INTRAVENOUS; SUBCUTANEOUS at 19:30

## 2021-08-31 RX ADMIN — OXYCODONE HYDROCHLORIDE 10 MILLIGRAM(S): 5 TABLET ORAL at 22:50

## 2021-08-31 RX ADMIN — ENOXAPARIN SODIUM 40 MILLIGRAM(S): 100 INJECTION SUBCUTANEOUS at 22:32

## 2021-08-31 RX ADMIN — Medication 975 MILLIGRAM(S): at 23:10

## 2021-08-31 RX ADMIN — Medication 100 MILLIGRAM(S): at 21:33

## 2021-08-31 RX ADMIN — HYDROMORPHONE HYDROCHLORIDE 0.5 MILLIGRAM(S): 2 INJECTION INTRAMUSCULAR; INTRAVENOUS; SUBCUTANEOUS at 19:16

## 2021-08-31 RX ADMIN — Medication 975 MILLIGRAM(S): at 18:25

## 2021-08-31 RX ADMIN — OXYCODONE HYDROCHLORIDE 10 MILLIGRAM(S): 5 TABLET ORAL at 21:50

## 2021-08-31 RX ADMIN — Medication 975 MILLIGRAM(S): at 19:16

## 2021-08-31 NOTE — BRIEF OPERATIVE NOTE - NSICDXBRIEFPROCEDURE_GEN_ALL_CORE_FT
PROCEDURES:  Abdominoplasty, open 31-Aug-2021 17:06:39  Sandie Keita  Mastopexy 31-Aug-2021 17:06:48  Sandie Keita

## 2021-08-31 NOTE — PACU DISCHARGE NOTE - COMMENTS
report given to lena rivera, pt. a/o x4 ,hemodynamically stable. pain controlled maintained bed  on flex position .d/c to floor on a bed.

## 2021-09-01 ENCOUNTER — TRANSCRIPTION ENCOUNTER (OUTPATIENT)
Age: 56
End: 2021-09-01

## 2021-09-01 VITALS
DIASTOLIC BLOOD PRESSURE: 78 MMHG | RESPIRATION RATE: 17 BRPM | HEART RATE: 106 BPM | SYSTOLIC BLOOD PRESSURE: 110 MMHG | TEMPERATURE: 98 F | OXYGEN SATURATION: 96 %

## 2021-09-01 LAB
COVID-19 SPIKE DOMAIN AB INTERP: POSITIVE
COVID-19 SPIKE DOMAIN ANTIBODY RESULT: >250 U/ML — HIGH
SARS-COV-2 IGG+IGM SERPL QL IA: >250 U/ML — HIGH
SARS-COV-2 IGG+IGM SERPL QL IA: POSITIVE

## 2021-09-01 RX ORDER — LIOTHYRONINE SODIUM 25 UG/1
1 TABLET ORAL
Qty: 0 | Refills: 0 | DISCHARGE
Start: 2021-09-01

## 2021-09-01 RX ORDER — LIOTHYRONINE SODIUM 25 UG/1
5 TABLET ORAL DAILY
Refills: 0 | Status: DISCONTINUED | OUTPATIENT
Start: 2021-09-01 | End: 2021-09-01

## 2021-09-01 RX ORDER — BUPROPION HYDROCHLORIDE 150 MG/1
150 TABLET, EXTENDED RELEASE ORAL DAILY
Refills: 0 | Status: DISCONTINUED | OUTPATIENT
Start: 2021-09-01 | End: 2021-09-01

## 2021-09-01 RX ORDER — CELECOXIB 200 MG/1
1 CAPSULE ORAL
Qty: 0 | Refills: 0 | DISCHARGE

## 2021-09-01 RX ORDER — LIOTHYRONINE SODIUM 25 UG/1
2 TABLET ORAL
Qty: 0 | Refills: 0 | DISCHARGE
Start: 2021-09-01

## 2021-09-01 RX ORDER — PANTOPRAZOLE SODIUM 20 MG/1
40 TABLET, DELAYED RELEASE ORAL
Refills: 0 | Status: DISCONTINUED | OUTPATIENT
Start: 2021-09-01 | End: 2021-09-01

## 2021-09-01 RX ORDER — ACETAMINOPHEN 500 MG
3 TABLET ORAL
Qty: 0 | Refills: 0 | DISCHARGE
Start: 2021-09-01

## 2021-09-01 RX ORDER — PANTOPRAZOLE SODIUM 20 MG/1
1 TABLET, DELAYED RELEASE ORAL
Qty: 0 | Refills: 0 | DISCHARGE
Start: 2021-09-01

## 2021-09-01 RX ORDER — LEVOTHYROXINE SODIUM 125 MCG
112 TABLET ORAL DAILY
Refills: 0 | Status: DISCONTINUED | OUTPATIENT
Start: 2021-09-01 | End: 2021-09-01

## 2021-09-01 RX ADMIN — Medication 975 MILLIGRAM(S): at 00:10

## 2021-09-01 RX ADMIN — OXYCODONE HYDROCHLORIDE 10 MILLIGRAM(S): 5 TABLET ORAL at 01:51

## 2021-09-01 RX ADMIN — Medication 975 MILLIGRAM(S): at 05:29

## 2021-09-01 RX ADMIN — Medication 975 MILLIGRAM(S): at 12:02

## 2021-09-01 RX ADMIN — Medication 5 MILLIGRAM(S): at 00:31

## 2021-09-01 RX ADMIN — Medication 975 MILLIGRAM(S): at 06:29

## 2021-09-01 RX ADMIN — OXYCODONE HYDROCHLORIDE 10 MILLIGRAM(S): 5 TABLET ORAL at 02:51

## 2021-09-01 RX ADMIN — Medication 975 MILLIGRAM(S): at 11:02

## 2021-09-01 RX ADMIN — OXYCODONE HYDROCHLORIDE 10 MILLIGRAM(S): 5 TABLET ORAL at 06:11

## 2021-09-01 RX ADMIN — OXYCODONE HYDROCHLORIDE 10 MILLIGRAM(S): 5 TABLET ORAL at 12:00

## 2021-09-01 RX ADMIN — Medication 100 MILLIGRAM(S): at 05:29

## 2021-09-01 RX ADMIN — OXYCODONE HYDROCHLORIDE 10 MILLIGRAM(S): 5 TABLET ORAL at 07:11

## 2021-09-01 RX ADMIN — OXYCODONE HYDROCHLORIDE 10 MILLIGRAM(S): 5 TABLET ORAL at 11:00

## 2021-09-01 NOTE — DISCHARGE NOTE PROVIDER - NSDCMRMEDTOKEN_GEN_ALL_CORE_FT
acetaminophen 325 mg oral tablet: 3 tab(s) orally every 6 hours  ciprofloxacin 500 mg oral tablet: 1 tab(s) orally every 12 hours  dicyclomine 10 mg oral capsule: 1 cap(s) orally 2 times a day (before meals)  esomeprazole 40 mg oral delayed release capsule: 1 cap(s) orally once a day  liothyronine 5 mcg oral tablet: 1 tab(s) orally once a day  pantoprazole 40 mg oral delayed release tablet: 1 tab(s) orally once a day (before a meal)  Percocet 5/325 oral tablet: 1 tab(s) orally every 6 hours, As Needed for severe pain  Synthroid 112 mcg (0.112 mg) oral tablet: 1 tab(s) orally once a day  Wellbutrin XL: 325  orally once a day

## 2021-09-01 NOTE — DISCHARGE NOTE PROVIDER - NSDCFUADDINST_GEN_ALL_CORE_FT
Follow Dr. Trevizo's instructions. Leave dressings in place. ALOK Drain Care:  You will be discharged with ALOK drains. You will need to empty them and record outputs accurately. This will be taught to you by the nursing staff. Please do not remove the ALOK drains. They will be removed in the office. Please bring to the office accurate records of output.

## 2021-09-01 NOTE — DISCHARGE NOTE NURSING/CASE MANAGEMENT/SOCIAL WORK - NSDCPEFALRISK_GEN_ALL_CORE
For information on Fall & injury Prevention, visit [email or web address]

## 2021-09-01 NOTE — DISCHARGE NOTE PROVIDER - HOSPITAL COURSE
Patient underwent umbilical hernia repair, abdominoplasty, and mastopexy on 8/31. Patient tolerated the procedure well with no complications. Pt was admitted following the procedure and met all postoperative milestones. On POD1 wound was c/d/i with no collections at the abdomen or breast. On day of discharge she is hemodynamically stable and tolerating diet. She will be discharged to home and will follow up with Dr. Trevizo in 1 week.

## 2021-09-01 NOTE — DISCHARGE NOTE PROVIDER - NSDCCPCAREPLAN_GEN_ALL_CORE_FT
PRINCIPAL DISCHARGE DIAGNOSIS  Diagnosis: Umbilical hernia  Assessment and Plan of Treatment:

## 2021-09-01 NOTE — DISCHARGE NOTE PROVIDER - NSDCQMPCI_CARD_ALL_CORE
EDUARDO CUEVAS  : 1955  10/09/18 08:58:45  ACCOUNT:  [id number]  HOME PHONE:  [phone]  WORK PHONE:  [phone]  Ana osorio will be disucssed at ov     No

## 2021-09-01 NOTE — DISCHARGE NOTE NURSING/CASE MANAGEMENT/SOCIAL WORK - PATIENT PORTAL LINK FT
You can access the FollowMyHealth Patient Portal offered by [facility] by registering at the following website: [email or web address]. By joining [facility]’s FollowMyHealth portal, you will also be able to view your health information using other applications (apps) compatible with our system.

## 2021-09-01 NOTE — PROGRESS NOTE ADULT - SUBJECTIVE AND OBJECTIVE BOX
SUBJECTIVE: POD1 s/p abdominoplasty, umbilical hernia repair, and mastopexy. Pt examined at bedside this morning. No acute events overnight. AVSS    MEDICATIONS  (STANDING):  acetaminophen   Tablet .. 975 milliGRAM(s) Oral every 6 hours  BUpivacaine liposome 1.3% Injectable (no eMAR) 20 milliLiter(s) Local Injection once  ceFAZolin   IVPB 2000 milliGRAM(s) IV Intermittent every 8 hours  enoxaparin Injectable 40 milliGRAM(s) SubCutaneous every 24 hours  lactated ringers. 1000 milliLiter(s) (50 mL/Hr) IV Continuous <Continuous>    MEDICATIONS  (PRN):  benzocaine 15 mG/menthol 3.6 mG Lozenge 1 Lozenge Oral five times a day PRN Sore Throat  diazepam    Tablet 5 milliGRAM(s) Oral every 8 hours PRN muscle spasm  HYDROmorphone  Injectable 0.5 milliGRAM(s) IV Push every 15 minutes PRN PAIN IN PACU ONLY  metoclopramide Injectable 10 milliGRAM(s) IV Push every 6 hours PRN nausea after zofran  ondansetron Injectable 4 milliGRAM(s) IV Push every 6 hours PRN Nausea and/or Vomiting  oxyCODONE    IR 5 milliGRAM(s) Oral every 4 hours PRN Moderate Pain (4 - 6)  oxyCODONE    IR 10 milliGRAM(s) Oral every 4 hours PRN Severe Pain (7 - 10)      Vital Signs Last 24 Hrs  T(C): 36.7 (01 Sep 2021 05:29), Max: 37 (01 Sep 2021 01:00)  T(F): 98 (01 Sep 2021 05:29), Max: 98.6 (01 Sep 2021 01:00)  HR: 98 (01 Sep 2021 05:29) (90 - 117)  BP: 112/76 (01 Sep 2021 05:29) (112/76 - 131/83)  BP(mean): 102 (31 Aug 2021 19:08) (94 - 102)  RR: 18 (01 Sep 2021 05:29) (16 - 24)  SpO2: 96% (01 Sep 2021 05:29) (94% - 99%)    Physical Exam:  General: NAD  Chest/Abd: Incisions c/d/i, no collections at breast or abdomen, ss drainage near umbilicus, drains serosanguinous L/R 75/80.    I&O's Summary    31 Aug 2021 07:01  -  01 Sep 2021 06:57  --------------------------------------------------------  IN: 100 mL / OUT: 330 mL / NET: -230 mL

## 2021-09-01 NOTE — PROGRESS NOTE ADULT - ASSESSMENT
56F POD1 s/p umbilical hernia repair, abdominoplasty, and mastopexy.    - Regular diet  - Pain control  - Lovenox  - DC quan  - DC IVF  - Discharge home today

## 2021-09-01 NOTE — DISCHARGE NOTE PROVIDER - CARE PROVIDER_API CALL
Kaiden Trevizo)  Plastic Surgery; Surgery  [address] [place] [address]  Phone: [phone]  Fax: [phone]  Follow Up Time: 1 week

## 2021-09-02 ENCOUNTER — INPATIENT (INPATIENT)
Facility: HOSPITAL | Age: 56
LOS: 0 days | Discharge: HOME | End: 2021-09-03
Attending: INTERNAL MEDICINE | Admitting: INTERNAL MEDICINE
Payer: COMMERCIAL

## 2021-09-02 VITALS
RESPIRATION RATE: 18 BRPM | HEART RATE: 92 BPM | HEIGHT: 63 IN | DIASTOLIC BLOOD PRESSURE: 68 MMHG | OXYGEN SATURATION: 100 % | TEMPERATURE: 98 F | SYSTOLIC BLOOD PRESSURE: 102 MMHG

## 2021-09-02 DIAGNOSIS — Z90.49 ACQUIRED ABSENCE OF OTHER SPECIFIED PARTS OF DIGESTIVE TRACT: Chronic | ICD-10-CM

## 2021-09-02 DIAGNOSIS — Z98.890 OTHER SPECIFIED POSTPROCEDURAL STATES: Chronic | ICD-10-CM

## 2021-09-02 DIAGNOSIS — I26.99 OTHER PULMONARY EMBOLISM WITHOUT ACUTE COR PULMONALE: ICD-10-CM

## 2021-09-02 LAB
ALBUMIN SERPL ELPH-MCNC: 3.3 G/DL — SIGNIFICANT CHANGE UP (ref 3.3–5)
ALP SERPL-CCNC: 70 U/L — SIGNIFICANT CHANGE UP (ref 40–120)
ALT FLD-CCNC: 15 U/L — SIGNIFICANT CHANGE UP (ref 4–33)
ANION GAP SERPL CALC-SCNC: 10 MMOL/L — SIGNIFICANT CHANGE UP (ref 7–14)
APTT BLD: 25.4 SEC — LOW (ref 27–36.3)
AST SERPL-CCNC: 23 U/L — SIGNIFICANT CHANGE UP (ref 4–32)
BASOPHILS # BLD AUTO: 0.01 K/UL — SIGNIFICANT CHANGE UP (ref 0–0.2)
BASOPHILS NFR BLD AUTO: 0.2 % — SIGNIFICANT CHANGE UP (ref 0–2)
BILIRUB SERPL-MCNC: <0.2 MG/DL — SIGNIFICANT CHANGE UP (ref 0.2–1.2)
BLOOD GAS VENOUS COMPREHENSIVE RESULT: SIGNIFICANT CHANGE UP
BUN SERPL-MCNC: 14 MG/DL — SIGNIFICANT CHANGE UP (ref 7–23)
CALCIUM SERPL-MCNC: 8.4 MG/DL — SIGNIFICANT CHANGE UP (ref 8.4–10.5)
CHLORIDE SERPL-SCNC: 100 MMOL/L — SIGNIFICANT CHANGE UP (ref 98–107)
CO2 SERPL-SCNC: 26 MMOL/L — SIGNIFICANT CHANGE UP (ref 22–31)
CREAT SERPL-MCNC: 0.68 MG/DL — SIGNIFICANT CHANGE UP (ref 0.5–1.3)
EOSINOPHIL # BLD AUTO: 0.05 K/UL — SIGNIFICANT CHANGE UP (ref 0–0.5)
EOSINOPHIL NFR BLD AUTO: 0.8 % — SIGNIFICANT CHANGE UP (ref 0–6)
GLUCOSE SERPL-MCNC: 110 MG/DL — HIGH (ref 70–99)
HCT VFR BLD CALC: 33 % — LOW (ref 34.5–45)
HGB BLD-MCNC: 10.8 G/DL — LOW (ref 11.5–15.5)
IANC: 5.53 K/UL — SIGNIFICANT CHANGE UP (ref 1.5–8.5)
IMM GRANULOCYTES NFR BLD AUTO: 0.2 % — SIGNIFICANT CHANGE UP (ref 0–1.5)
INR BLD: 1.1 RATIO — SIGNIFICANT CHANGE UP (ref 0.88–1.16)
LYMPHOCYTES # BLD AUTO: 0.55 K/UL — LOW (ref 1–3.3)
LYMPHOCYTES # BLD AUTO: 8.3 % — LOW (ref 13–44)
MCHC RBC-ENTMCNC: 29.3 PG — SIGNIFICANT CHANGE UP (ref 27–34)
MCHC RBC-ENTMCNC: 32.7 GM/DL — SIGNIFICANT CHANGE UP (ref 32–36)
MCV RBC AUTO: 89.7 FL — SIGNIFICANT CHANGE UP (ref 80–100)
MONOCYTES # BLD AUTO: 0.49 K/UL — SIGNIFICANT CHANGE UP (ref 0–0.9)
MONOCYTES NFR BLD AUTO: 7.4 % — SIGNIFICANT CHANGE UP (ref 2–14)
NEUTROPHILS # BLD AUTO: 5.53 K/UL — SIGNIFICANT CHANGE UP (ref 1.8–7.4)
NEUTROPHILS NFR BLD AUTO: 83.1 % — HIGH (ref 43–77)
NRBC # BLD: 0 /100 WBCS — SIGNIFICANT CHANGE UP
NRBC # FLD: 0 K/UL — SIGNIFICANT CHANGE UP
PLATELET # BLD AUTO: 261 K/UL — SIGNIFICANT CHANGE UP (ref 150–400)
POTASSIUM SERPL-MCNC: 4 MMOL/L — SIGNIFICANT CHANGE UP (ref 3.5–5.3)
POTASSIUM SERPL-SCNC: 4 MMOL/L — SIGNIFICANT CHANGE UP (ref 3.5–5.3)
PROT SERPL-MCNC: 6.4 G/DL — SIGNIFICANT CHANGE UP (ref 6–8.3)
PROTHROM AB SERPL-ACNC: 12.6 SEC — SIGNIFICANT CHANGE UP (ref 10.6–13.6)
RBC # BLD: 3.68 M/UL — LOW (ref 3.8–5.2)
RBC # FLD: 14 % — SIGNIFICANT CHANGE UP (ref 10.3–14.5)
SARS-COV-2 RNA SPEC QL NAA+PROBE: SIGNIFICANT CHANGE UP
SODIUM SERPL-SCNC: 136 MMOL/L — SIGNIFICANT CHANGE UP (ref 135–145)
TROPONIN T, HIGH SENSITIVITY RESULT: <6 NG/L — SIGNIFICANT CHANGE UP
WBC # BLD: 6.64 K/UL — SIGNIFICANT CHANGE UP (ref 3.8–10.5)
WBC # FLD AUTO: 6.64 K/UL — SIGNIFICANT CHANGE UP (ref 3.8–10.5)

## 2021-09-02 PROCEDURE — 99285 EMERGENCY DEPT VISIT HI MDM: CPT | Mod: 25

## 2021-09-02 PROCEDURE — 93010 ELECTROCARDIOGRAM REPORT: CPT

## 2021-09-02 PROCEDURE — 71045 X-RAY EXAM CHEST 1 VIEW: CPT | Mod: 26

## 2021-09-02 PROCEDURE — 71275 CT ANGIOGRAPHY CHEST: CPT | Mod: 26

## 2021-09-02 PROCEDURE — 74177 CT ABD & PELVIS W/CONTRAST: CPT | Mod: 26

## 2021-09-02 RX ORDER — LEVOTHYROXINE SODIUM 125 MCG
1 TABLET ORAL
Qty: 0 | Refills: 0 | DISCHARGE

## 2021-09-02 RX ORDER — OXYCODONE HYDROCHLORIDE 5 MG/1
5 TABLET ORAL ONCE
Refills: 0 | Status: DISCONTINUED | OUTPATIENT
Start: 2021-09-02 | End: 2021-09-02

## 2021-09-02 RX ORDER — HEPARIN SODIUM 5000 [USP'U]/ML
2500 INJECTION INTRAVENOUS; SUBCUTANEOUS EVERY 6 HOURS
Refills: 0 | Status: DISCONTINUED | OUTPATIENT
Start: 2021-09-02 | End: 2021-09-03

## 2021-09-02 RX ORDER — HYDROMORPHONE HYDROCHLORIDE 2 MG/ML
0.5 INJECTION INTRAMUSCULAR; INTRAVENOUS; SUBCUTANEOUS ONCE
Refills: 0 | Status: DISCONTINUED | OUTPATIENT
Start: 2021-09-02 | End: 2021-09-02

## 2021-09-02 RX ORDER — ACETAMINOPHEN 500 MG
650 TABLET ORAL ONCE
Refills: 0 | Status: COMPLETED | OUTPATIENT
Start: 2021-09-02 | End: 2021-09-02

## 2021-09-02 RX ORDER — OXYCODONE AND ACETAMINOPHEN 5; 325 MG/1; MG/1
2 TABLET ORAL EVERY 6 HOURS
Refills: 0 | Status: DISCONTINUED | OUTPATIENT
Start: 2021-09-02 | End: 2021-09-03

## 2021-09-02 RX ORDER — LANOLIN ALCOHOL/MO/W.PET/CERES
3 CREAM (GRAM) TOPICAL AT BEDTIME
Refills: 0 | Status: DISCONTINUED | OUTPATIENT
Start: 2021-09-02 | End: 2021-09-03

## 2021-09-02 RX ORDER — HEPARIN SODIUM 5000 [USP'U]/ML
INJECTION INTRAVENOUS; SUBCUTANEOUS
Qty: 25000 | Refills: 0 | Status: DISCONTINUED | OUTPATIENT
Start: 2021-09-02 | End: 2021-09-03

## 2021-09-02 RX ORDER — DIAZEPAM 5 MG
5 TABLET ORAL ONCE
Refills: 0 | Status: DISCONTINUED | OUTPATIENT
Start: 2021-09-02 | End: 2021-09-02

## 2021-09-02 RX ORDER — HEPARIN SODIUM 5000 [USP'U]/ML
5500 INJECTION INTRAVENOUS; SUBCUTANEOUS ONCE
Refills: 0 | Status: COMPLETED | OUTPATIENT
Start: 2021-09-02 | End: 2021-09-02

## 2021-09-02 RX ORDER — BUPROPION HYDROCHLORIDE 150 MG/1
3 TABLET, EXTENDED RELEASE ORAL
Qty: 0 | Refills: 0 | DISCHARGE

## 2021-09-02 RX ORDER — BUPROPION HYDROCHLORIDE 150 MG/1
325 TABLET, EXTENDED RELEASE ORAL
Qty: 0 | Refills: 0 | DISCHARGE

## 2021-09-02 RX ORDER — FAMOTIDINE 10 MG/ML
20 INJECTION INTRAVENOUS ONCE
Refills: 0 | Status: COMPLETED | OUTPATIENT
Start: 2021-09-02 | End: 2021-09-02

## 2021-09-02 RX ORDER — HEPARIN SODIUM 5000 [USP'U]/ML
5500 INJECTION INTRAVENOUS; SUBCUTANEOUS EVERY 6 HOURS
Refills: 0 | Status: DISCONTINUED | OUTPATIENT
Start: 2021-09-02 | End: 2021-09-03

## 2021-09-02 RX ORDER — MORPHINE SULFATE 50 MG/1
2 CAPSULE, EXTENDED RELEASE ORAL ONCE
Refills: 0 | Status: DISCONTINUED | OUTPATIENT
Start: 2021-09-02 | End: 2021-09-02

## 2021-09-02 RX ORDER — ONDANSETRON 8 MG/1
4 TABLET, FILM COATED ORAL EVERY 8 HOURS
Refills: 0 | Status: DISCONTINUED | OUTPATIENT
Start: 2021-09-02 | End: 2021-09-03

## 2021-09-02 RX ORDER — SODIUM CHLORIDE 9 MG/ML
1000 INJECTION INTRAMUSCULAR; INTRAVENOUS; SUBCUTANEOUS ONCE
Refills: 0 | Status: COMPLETED | OUTPATIENT
Start: 2021-09-02 | End: 2021-09-02

## 2021-09-02 RX ORDER — ACETAMINOPHEN 500 MG
1000 TABLET ORAL ONCE
Refills: 0 | Status: COMPLETED | OUTPATIENT
Start: 2021-09-02 | End: 2021-09-02

## 2021-09-02 RX ORDER — OXYCODONE AND ACETAMINOPHEN 5; 325 MG/1; MG/1
1 TABLET ORAL EVERY 6 HOURS
Refills: 0 | Status: DISCONTINUED | OUTPATIENT
Start: 2021-09-02 | End: 2021-09-03

## 2021-09-02 RX ORDER — SENNA PLUS 8.6 MG/1
2 TABLET ORAL AT BEDTIME
Refills: 0 | Status: DISCONTINUED | OUTPATIENT
Start: 2021-09-02 | End: 2021-09-03

## 2021-09-02 RX ADMIN — OXYCODONE HYDROCHLORIDE 5 MILLIGRAM(S): 5 TABLET ORAL at 18:01

## 2021-09-02 RX ADMIN — Medication 5 MILLIGRAM(S): at 22:42

## 2021-09-02 RX ADMIN — HEPARIN SODIUM 1200 UNIT(S)/HR: 5000 INJECTION INTRAVENOUS; SUBCUTANEOUS at 18:02

## 2021-09-02 RX ADMIN — HYDROMORPHONE HYDROCHLORIDE 0.5 MILLIGRAM(S): 2 INJECTION INTRAMUSCULAR; INTRAVENOUS; SUBCUTANEOUS at 22:34

## 2021-09-02 RX ADMIN — Medication 400 MILLIGRAM(S): at 15:49

## 2021-09-02 RX ADMIN — SODIUM CHLORIDE 1000 MILLILITER(S): 9 INJECTION INTRAMUSCULAR; INTRAVENOUS; SUBCUTANEOUS at 14:51

## 2021-09-02 RX ADMIN — SODIUM CHLORIDE 1000 MILLILITER(S): 9 INJECTION INTRAMUSCULAR; INTRAVENOUS; SUBCUTANEOUS at 13:18

## 2021-09-02 RX ADMIN — Medication 1000 MILLIGRAM(S): at 16:05

## 2021-09-02 RX ADMIN — HEPARIN SODIUM 5500 UNIT(S): 5000 INJECTION INTRAVENOUS; SUBCUTANEOUS at 18:00

## 2021-09-02 NOTE — ED PROVIDER NOTE - OBJECTIVE STATEMENT
Dr Walton  55yo F hx of Thyroid dz, hx GERD, hx hysterectomy on hormone tx,  POD #2 s/p abdominoplasty, umbilical hernia repair, and mastopexy pw from home sp syncopal episode. pt states that she got up, urinated,  with with pt, and "I passed out" states that "he caught me" no fall or head trauma. Denies any cp/sob/palpitations before or after the event. Denies any n/v/d No fever. Took oxycodone and valium this am for pain. Able to eat breakfast. Hx of prior syncope "as a teenager. whenever I got hot or tired"

## 2021-09-02 NOTE — ED ADULT NURSE REASSESSMENT NOTE - NS ED NURSE REASSESS COMMENT FT1
report given to Mitzi RANDALL in E[facility], Pt started on Heparin gtt at 1200 units /hr with heparin bolus 5500 units given prior to pt going to E[facility] 2. Pt placed on tele monitor SR HR 80s.

## 2021-09-02 NOTE — CONSULT NOTE ADULT - SUBJECTIVE AND OBJECTIVE BOX
Plastic Surgery Consult Note  ---------------------------------------    Chief Complaint:  Patient is a 56y old  Female who presents with a chief complaint of syncope    HPI:  HPI: 55yo F hx of Thyroid dz, hx GERD, hx hysterectomy on hormone tx, currently on POD #2 s/p abdominoplasty, umbilical hernia repair, and mastopexy that came to the ED due to an episode of sycope at home. Denies any cp/sob/palpitations before or after the event. Denies any n/v/d No fever. She has been taking oxycodone and valium. She has had syncope in the past. She relays no issues with her recent incision sites.       PMH  Hypothyroidism    Endometriosis    Uterine polyp    Hypothyroidism    Anxiety and depression    IBS (irritable bowel syndrome)    H/O gastritis    History of diverticulitis    Kidney stones    History of umbilical hernia    Uterine fibroid        PSH  No significant past surgical history    No significant past surgical history    S/P laparoscopic cholecystectomy    S/P laparoscopic surgery    S/P LASIK (laser assisted in situ keratomileusis) of both eyes    S/P dilatation and curettage        MEDS  Home Medications:  acetaminophen 325 mg oral tablet: 3 tab(s) orally every 6 hours (02 Sep 2021 17:14)  buPROPion 150 mg/24 hours (XL) oral tablet, extended release: 3 tabs (450 mg) orally every 24 hours (02 Sep 2021 17:14)  celecoxib 200 mg oral capsule: 1 cap(s) orally 2 times a day (Start one day before surgery and take for four days after surgery)    **Per pharmacy, rx dispensed 8/26/21 (02 Sep 2021 17:14)  ciprofloxacin 500 mg oral tablet: 1 tab(s) orally every 12 hours    **Per pharmacy, 5 day supply of ciprofloxacin picked up on 8/26/21 (02 Sep 2021 17:14)  diazePAM 5 mg oral tablet: 1 tab(s) orally every 6 hours, As Needed    **Per [facility] (reference #[id number]), rx dispensed 8/26/21 for 5 day supply (02 Sep 2021 17:14)  dicyclomine 10 mg oral capsule: 1 cap(s) orally 2 times a day (before meals) (02 Sep 2021 17:14)  esomeprazole 40 mg oral delayed release capsule: 1 cap(s) orally once a day    **Per pharmacy, rx picked up on 8/26/21 (02 Sep 2021 17:14)  levothyroxine 112 mcg (0.112 mg) oral tablet: 1 tab(s) orally once a day (02 Sep 2021 17:14)  liothyronine 5 mcg oral tablet: 2 tab(s) orally once a day (02 Sep 2021 17:14)  ondansetron 4 mg oral tablet: 1 tab(s) orally every 4 hours, As Needed (02 Sep 2021 17:14)  oxycodone-acetaminophen 5 mg-325 mg oral tablet: 1 - 2 tab(s) orally every 4 - 6 hours, As Needed for pain    **Per [facility] (reference #[id number]), rx dispensed on 8/26/21 for 3 day supply (02 Sep 2021 17:14)  pantoprazole 40 mg oral delayed release tablet: 1 tab(s) orally once a day (before a meal)    **Per pharmacy, rx picked up 8/2/21 (02 Sep 2021 17:14)  Saxenda 18 mg/3 mL subcutaneous solution: 3 milligram (0.5 mL) subcutaneous once a day (02 Sep 2021 17:14)  zolpidem 5 mg oral tablet: 1 tab(s) orally once a day (at bedtime), As Needed    **Per [facility] (reference #[id number]), rx dispensed 8/26/21 for 10 day supply (02 Sep 2021 17:14)    Allergies    No Known Allergies    Intolerances        Social  Social History:        Physical Exam  Gen: NAD, comfortable  Breast and abdomen: Exam was deferred due to patient refusal while in the hallway. drains s/s        T(C): 36.7 (09-02-21 @ 18:11), Max: 36.9 (09-02-21 @ 11:37)  HR: 92 (09-02-21 @ 18:11) (86 - 99)  BP: 119/73 (09-02-21 @ 18:11) (102/68 - 124/84)  RR: 18 (09-02-21 @ 18:11) (18 - 21)  SpO2: 100% (09-02-21 @ 18:11) (97% - 100%)  Wt(kg): --  Tmax: T(C): , Max: 36.9 (09-02-21 @ 11:37)  Wt(kg): --      Labs:                        10.8   6.64  )-----------( 261      ( 02 Sep 2021 13:18 )             33.0     09-02    136  |  100  |  14  ----------------------------<  110<H>  4.0   |  26  |  0.68    Ca    8.4      02 Sep 2021 13:18    TPro  6.4  /  Alb  3.3  /  TBili  <0.2  /  DBili  x   /  AST  23  /  ALT  15  /  AlkPhos  70  09-02    PT/INR - ( 02 Sep 2021 13:18 )   PT: 12.6 sec;   INR: 1.10 ratio         PTT - ( 02 Sep 2021 13:18 )  PTT:25.4 sec

## 2021-09-02 NOTE — ED ADULT NURSE NOTE - OBJECTIVE STATEMENT
Received pt into spot #2 via stretcher. Pt arrives with #20 angio to left wrist by EMS. Pt awake, drowsy/oriented x 4. Pt is post op 2 days from receiving hernia repair, tummy tuck and breast lift. S/p syncopal episode at home while in bathroom. Pt states was trying to use female urinal while standing in front of bathroom sink. Pt was straining to urinate but unable to urinate. Pt states then felt dizziness and was assisted to sit on toilet by  where she then became unresponsive as per . Pt states she was able to hear everything but not able to respond. Bilateral ALOK drains noted to abdomen. post surgical bra on pt. No bleeding noted to dressing. Placed on tele monitor SR HR 90s. Positive pedal pulses bilaterally.No swelling to lower exts, denies any pain to lower ext, denies any SOB, dizziness, nausea, palpitations, fever/chills.Pt eval by medical student and Dr. Walton. Will continue to monitor. Awaiting orders,

## 2021-09-02 NOTE — ED ADULT TRIAGE NOTE - CHIEF COMPLAINT QUOTE
Arrives from home with  (heather [phone]) reports pt is POD #2 s/p tummy tuck and breast enhancement surgery. Had episode of dizziness and weakness while standing in the bathroom, +LOC but did not hit head. Does not use blood thinners. Arrives with 2 ALOK drains to lower abdomen. Denies other PMH

## 2021-09-02 NOTE — ED ADULT NURSE NOTE - CHIEF COMPLAINT QUOTE
Arrives from home with  (heather [phone]) reports pt is POD #2 s/p tummy tuck and breast enhancement surgery. Had episode of dizziness and weakness while standing in the bathroom, +LOC but did not hit head. Does not use blood thinners. Arrives with 2 ALOK drains to lower abdomen. Denies other PMH

## 2021-09-02 NOTE — ED PROVIDER NOTE - CLINICAL SUMMARY MEDICAL DECISION MAKING FREE TEXT BOX
plan labs, ekg, ct chest r/o PE, ct abdomen-pelvis r/o abdominal pain. IVF, pain med, tele  monitor POD#2 Umbilical hernia repair abdominoplasty and mastopexy with presyncope. DDx vasovagal vs anema vs pulmonary embolism. plan labs, ekg, ct chest r/o PE, ct abdomen-pelvis r/o abdominal bleed. IVF, pain med, tele  monitor.

## 2021-09-02 NOTE — PHARMACOTHERAPY INTERVENTION NOTE - COMMENTS
Medication history verified with [facility]. Per [facility], patient filled both pantoprazole 40 mg (on 8/2/21 and currently ready for  again) and esomeprazole 40 mg (on 8/26/21) this month. Please call spectra [phone] if you have any questions.

## 2021-09-02 NOTE — CONSULT NOTE ADULT - ASSESSMENT
56 y.o. F w/ a currently POD#2 Umbilical hernia repair abdominoplasty and mastopexy by Joseline Knight.     Plan:  - heparin drip per medicine  - no acute plastic surgery intervention  - ambulation with assistance  - head of bed elevation/keep bed flexed.   - drain maintenance.

## 2021-09-02 NOTE — ED PROVIDER NOTE - PROGRESS NOTE DETAILS
pt stable, no distress. pending ct read, SPoke w office of plastic surgery. phone [phone] pt pending ct read and dispo. Sign out to night team Spoke with Dr Neville, aware of PE, safe to AC. No need to transfer.

## 2021-09-02 NOTE — ED PROCEDURE NOTE - PROCEDURE ADDITIONAL DETAILS
Emergency Department Focused Ultrasound performed at patient's bedside for educational purposes. The study will have a follow up study performed or was performed in the direct supervision of an ultrasound trained attending. Emergency Department Focused Ultrasound performed at patient's bedside for educational purposes. The study will have a follow up study performed or was performed in the direct supervision of an ultrasound trained attending. FAST and cardiac exam was not finished due to patient's pain. Emergency Department Focused Ultrasound performed at patient's bedside for educational purposes. The study will have a follow up study performed or was performed in the direct supervision of an  attending. FAST and cardiac exam was not finished due to patient's pain.

## 2021-09-02 NOTE — ED PROVIDER NOTE - PHYSICAL EXAMINATION
Dr Walton  laying down no distress. EOMI. nonicteric. not pale mmm.  no work of breathing. no retractions. pulse ox   +bra w bandage on abdomen. +bl drains w serosanguinous fluid   no pedal edema. no calf tenderness. normal pulses bilateral feet.

## 2021-09-03 ENCOUNTER — TRANSCRIPTION ENCOUNTER (OUTPATIENT)
Age: 56
End: 2021-09-03

## 2021-09-03 VITALS — OXYGEN SATURATION: 99 % | RESPIRATION RATE: 18 BRPM | HEART RATE: 99 BPM

## 2021-09-03 DIAGNOSIS — Z90.710 ACQUIRED ABSENCE OF BOTH CERVIX AND UTERUS: ICD-10-CM

## 2021-09-03 DIAGNOSIS — Z20.822 CONTACT WITH AND (SUSPECTED) EXPOSURE TO COVID-19: ICD-10-CM

## 2021-09-03 DIAGNOSIS — Z98.890 OTHER SPECIFIED POSTPROCEDURAL STATES: ICD-10-CM

## 2021-09-03 DIAGNOSIS — K42.9 UMBILICAL HERNIA WITHOUT OBSTRUCTION OR GANGRENE: ICD-10-CM

## 2021-09-03 DIAGNOSIS — L57.4 CUTIS LAXA SENILIS: ICD-10-CM

## 2021-09-03 DIAGNOSIS — N64.81 PTOSIS OF BREAST: ICD-10-CM

## 2021-09-03 DIAGNOSIS — D64.9 ANEMIA, UNSPECIFIED: ICD-10-CM

## 2021-09-03 DIAGNOSIS — Z29.9 ENCOUNTER FOR PROPHYLACTIC MEASURES, UNSPECIFIED: ICD-10-CM

## 2021-09-03 DIAGNOSIS — I26.99 OTHER PULMONARY EMBOLISM WITHOUT ACUTE COR PULMONALE: ICD-10-CM

## 2021-09-03 DIAGNOSIS — E03.9 HYPOTHYROIDISM, UNSPECIFIED: ICD-10-CM

## 2021-09-03 LAB
ANION GAP SERPL CALC-SCNC: 9 MMOL/L — SIGNIFICANT CHANGE UP (ref 7–14)
APTT BLD: 125.4 SEC — CRITICAL HIGH (ref 27–36.3)
APTT BLD: 86.8 SEC — HIGH (ref 27–36.3)
BUN SERPL-MCNC: 11 MG/DL — SIGNIFICANT CHANGE UP (ref 7–23)
CALCIUM SERPL-MCNC: 8 MG/DL — LOW (ref 8.4–10.5)
CHLORIDE SERPL-SCNC: 104 MMOL/L — SIGNIFICANT CHANGE UP (ref 98–107)
CO2 SERPL-SCNC: 25 MMOL/L — SIGNIFICANT CHANGE UP (ref 22–31)
COVID-19 SPIKE DOMAIN AB INTERP: POSITIVE
COVID-19 SPIKE DOMAIN ANTIBODY RESULT: >250 U/ML — HIGH
CREAT SERPL-MCNC: 0.6 MG/DL — SIGNIFICANT CHANGE UP (ref 0.5–1.3)
GLUCOSE SERPL-MCNC: 123 MG/DL — HIGH (ref 70–99)
HCT VFR BLD CALC: 30.3 % — LOW (ref 34.5–45)
HCT VFR BLD CALC: 32.7 % — LOW (ref 34.5–45)
HGB BLD-MCNC: 10.7 G/DL — LOW (ref 11.5–15.5)
HGB BLD-MCNC: 9.8 G/DL — LOW (ref 11.5–15.5)
MAGNESIUM SERPL-MCNC: 1.8 MG/DL — SIGNIFICANT CHANGE UP (ref 1.6–2.6)
MCHC RBC-ENTMCNC: 28.5 PG — SIGNIFICANT CHANGE UP (ref 27–34)
MCHC RBC-ENTMCNC: 29 PG — SIGNIFICANT CHANGE UP (ref 27–34)
MCHC RBC-ENTMCNC: 32.3 GM/DL — SIGNIFICANT CHANGE UP (ref 32–36)
MCHC RBC-ENTMCNC: 32.7 GM/DL — SIGNIFICANT CHANGE UP (ref 32–36)
MCV RBC AUTO: 88.1 FL — SIGNIFICANT CHANGE UP (ref 80–100)
MCV RBC AUTO: 88.6 FL — SIGNIFICANT CHANGE UP (ref 80–100)
NRBC # BLD: 0 /100 WBCS — SIGNIFICANT CHANGE UP
NRBC # BLD: 0 /100 WBCS — SIGNIFICANT CHANGE UP
NRBC # FLD: 0 K/UL — SIGNIFICANT CHANGE UP
NRBC # FLD: 0 K/UL — SIGNIFICANT CHANGE UP
PHOSPHATE SERPL-MCNC: 2.3 MG/DL — LOW (ref 2.5–4.5)
PLATELET # BLD AUTO: 273 K/UL — SIGNIFICANT CHANGE UP (ref 150–400)
PLATELET # BLD AUTO: 275 K/UL — SIGNIFICANT CHANGE UP (ref 150–400)
POTASSIUM SERPL-MCNC: 3.5 MMOL/L — SIGNIFICANT CHANGE UP (ref 3.5–5.3)
POTASSIUM SERPL-SCNC: 3.5 MMOL/L — SIGNIFICANT CHANGE UP (ref 3.5–5.3)
RBC # BLD: 3.44 M/UL — LOW (ref 3.8–5.2)
RBC # BLD: 3.69 M/UL — LOW (ref 3.8–5.2)
RBC # FLD: 14.1 % — SIGNIFICANT CHANGE UP (ref 10.3–14.5)
RBC # FLD: 14.1 % — SIGNIFICANT CHANGE UP (ref 10.3–14.5)
SARS-COV-2 IGG+IGM SERPL QL IA: >250 U/ML — HIGH
SARS-COV-2 IGG+IGM SERPL QL IA: POSITIVE
SODIUM SERPL-SCNC: 138 MMOL/L — SIGNIFICANT CHANGE UP (ref 135–145)
WBC # BLD: 6.12 K/UL — SIGNIFICANT CHANGE UP (ref 3.8–10.5)
WBC # BLD: 6.97 K/UL — SIGNIFICANT CHANGE UP (ref 3.8–10.5)
WBC # FLD AUTO: 6.12 K/UL — SIGNIFICANT CHANGE UP (ref 3.8–10.5)
WBC # FLD AUTO: 6.97 K/UL — SIGNIFICANT CHANGE UP (ref 3.8–10.5)

## 2021-09-03 PROCEDURE — 99223 1ST HOSP IP/OBS HIGH 75: CPT | Mod: GC

## 2021-09-03 PROCEDURE — 99236 HOSP IP/OBS SAME DATE HI 85: CPT

## 2021-09-03 RX ORDER — LEVOTHYROXINE SODIUM 125 MCG
112 TABLET ORAL DAILY
Refills: 0 | Status: DISCONTINUED | OUTPATIENT
Start: 2021-09-03 | End: 2021-09-03

## 2021-09-03 RX ORDER — CIPROFLOXACIN LACTATE 400MG/40ML
1 VIAL (ML) INTRAVENOUS
Qty: 0 | Refills: 0 | DISCHARGE

## 2021-09-03 RX ORDER — ESOMEPRAZOLE MAGNESIUM 40 MG/1
1 CAPSULE, DELAYED RELEASE ORAL
Qty: 0 | Refills: 0 | DISCHARGE

## 2021-09-03 RX ORDER — SODIUM,POTASSIUM PHOSPHATES 278-250MG
1 POWDER IN PACKET (EA) ORAL
Refills: 0 | Status: DISCONTINUED | OUTPATIENT
Start: 2021-09-03 | End: 2021-09-03

## 2021-09-03 RX ORDER — CELECOXIB 200 MG/1
1 CAPSULE ORAL
Qty: 0 | Refills: 0 | DISCHARGE

## 2021-09-03 RX ORDER — DIAZEPAM 5 MG
1 TABLET ORAL
Qty: 0 | Refills: 0 | DISCHARGE

## 2021-09-03 RX ORDER — ZOLPIDEM TARTRATE 10 MG/1
1 TABLET ORAL
Qty: 0 | Refills: 0 | DISCHARGE

## 2021-09-03 RX ORDER — APIXABAN 2.5 MG/1
2 TABLET, FILM COATED ORAL
Qty: 72 | Refills: 0
Start: 2021-09-03 | End: 2021-10-02

## 2021-09-03 RX ORDER — APIXABAN 2.5 MG/1
10 TABLET, FILM COATED ORAL EVERY 12 HOURS
Refills: 0 | Status: DISCONTINUED | OUTPATIENT
Start: 2021-09-03 | End: 2021-09-03

## 2021-09-03 RX ORDER — CIPROFLOXACIN LACTATE 400MG/40ML
500 VIAL (ML) INTRAVENOUS EVERY 12 HOURS
Refills: 0 | Status: DISCONTINUED | OUTPATIENT
Start: 2021-09-03 | End: 2021-09-03

## 2021-09-03 RX ORDER — ERGOCALCIFEROL 1.25 MG/1
1 CAPSULE ORAL
Qty: 4 | Refills: 0
Start: 2021-09-03 | End: 2021-10-02

## 2021-09-03 RX ADMIN — OXYCODONE AND ACETAMINOPHEN 2 TABLET(S): 5; 325 TABLET ORAL at 00:46

## 2021-09-03 RX ADMIN — Medication 112 MICROGRAM(S): at 05:50

## 2021-09-03 RX ADMIN — HEPARIN SODIUM 1100 UNIT(S)/HR: 5000 INJECTION INTRAVENOUS; SUBCUTANEOUS at 01:53

## 2021-09-03 RX ADMIN — HEPARIN SODIUM 1100 UNIT(S)/HR: 5000 INJECTION INTRAVENOUS; SUBCUTANEOUS at 08:46

## 2021-09-03 RX ADMIN — OXYCODONE AND ACETAMINOPHEN 2 TABLET(S): 5; 325 TABLET ORAL at 11:03

## 2021-09-03 RX ADMIN — Medication 1 TABLET(S): at 11:05

## 2021-09-03 RX ADMIN — OXYCODONE AND ACETAMINOPHEN 2 TABLET(S): 5; 325 TABLET ORAL at 01:37

## 2021-09-03 RX ADMIN — Medication 500 MILLIGRAM(S): at 05:49

## 2021-09-03 RX ADMIN — Medication 3 MILLIGRAM(S): at 00:43

## 2021-09-03 RX ADMIN — APIXABAN 10 MILLIGRAM(S): 2.5 TABLET, FILM COATED ORAL at 12:06

## 2021-09-03 NOTE — DISCHARGE NOTE PROVIDER - NSDCMRMEDTOKEN_GEN_ALL_CORE_FT
apixaban 5 mg oral tablet: 2 tablets orally every 12 hours until 9/10  1 tablet  orally every 12 hours starting 9/11  buPROPion 150 mg/24 hours (XL) oral tablet, extended release: 3 tabs (450 mg) orally every 24 hours  ciprofloxacin 500 mg oral tablet: 1 tab(s) orally every 12 hours  diazePAM 5 mg oral tablet: 1 tab(s) orally every 6 hours, As Needed  dicyclomine 10 mg oral capsule: 1 cap(s) orally 2 times a day (before meals)  ergocalciferol 1.25 mg (50,000 intl units) oral capsule: 1 cap(s) orally once a week   esomeprazole 40 mg oral delayed release capsule: 1 cap(s) orally once a day  levothyroxine 112 mcg (0.112 mg) oral tablet: 1 tab(s) orally once a day  liothyronine 5 mcg oral tablet: 2 tab(s) orally once a day  ondansetron 4 mg oral tablet: 1 tab(s) orally every 4 hours, As Needed  oxycodone-acetaminophen 5 mg-325 mg oral tablet: 1 - 2 tab(s) orally every 4 - 6 hours, As Needed for pain    **Per [facility] (reference #[id number]), rx dispensed on 8/26/21 for 3 day supply  Saxenda 18 mg/3 mL subcutaneous solution: 3 milligram (0.5 mL) subcutaneous once a day  zolpidem 5 mg oral tablet: 1 tab(s) orally once a day (at bedtime), As Needed   apixaban 5 mg oral tablet: 2 tablets orally every 12 hours until 9/10  1 tablet  orally every 12 hours starting 9/11  buPROPion 150 mg/24 hours (XL) oral tablet, extended release: 3 tabs (450 mg) orally every 24 hours  ciprofloxacin 500 mg oral tablet: 1 tab(s) orally every 12 hours  diazePAM 5 mg oral tablet: 1 tab(s) orally every 6 hours, As Needed  ergocalciferol 1.25 mg (50,000 intl units) oral capsule: 1 cap(s) orally once a week   esomeprazole 40 mg oral delayed release capsule: 1 cap(s) orally once a day  levothyroxine 112 mcg (0.112 mg) oral tablet: 1 tab(s) orally once a day  ondansetron 4 mg oral tablet: 1 tab(s) orally every 4 hours, As Needed  oxycodone-acetaminophen 5 mg-325 mg oral tablet: 1 - 2 tab(s) orally every 4 - 6 hours, As Needed for pain  Saxenda 18 mg/3 mL subcutaneous solution: 3 milligram (0.5 mL) subcutaneous once a day  zolpidem 5 mg oral tablet: 1 tab(s) orally once a day (at bedtime), As Needed

## 2021-09-03 NOTE — DISCHARGE NOTE PROVIDER - NSDCFUADDAPPT_GEN_ALL_CORE_FT
Follow up with your primary care doctor within one week of discharge. If you do not have one, you can call the medicine clinic at [phone] to make an appointment.    Follow up with you surgeon within one week of discharge.    Follow up with your gynecologist within one week of discharge to discuss hormonal therapies and if dose adjustments are needed.

## 2021-09-03 NOTE — H&P ADULT - ASSESSMENT
57yo F with PMH hypothyroidism, GERD, hysterectomy 1/2021 on hormone therapy, as well as abdominoplasty, umbilical hernia repair, and mastopexy 8/31/2021 presenting with RUL PE on heparin infusion.

## 2021-09-03 NOTE — DISCHARGE NOTE NURSING/CASE MANAGEMENT/SOCIAL WORK - NSDCFUADDAPPT_GEN_ALL_CORE_FT
Follow up with your primary care doctor within one week of discharge. If you do not have one, you can call the medicine clinic at [phone] to make an appointment.    Follow up with you surgeon within one week of discharge.    Follow up with your gynecologist within one week of discharge to discuss hormonal therapies and if dose adjustments are needed.

## 2021-09-03 NOTE — H&P ADULT - ATTENDING COMMENTS
56 year old female, with past history as noted above, including recent umbilical hernia repair, abdominoplasty and mastopexy (POD#3), being managed for pulmonary embolism.  Presented with report of syncope.  Borderline tachycardic at times.  History of uncle with pulmonary embolism.  Cause of PE likely multifactorial: sedentary state, recent surgeries, current hormone therapy.  On heparin drip at present, but will need transition to NOAC.  No complaints relative to surgical sites; continue to PTA abx to complete course.  Ensure optimal pain control.  Ensure optimal hydration.  F/up repeat lab-work in the AM.    All other management as prescribed. 56 year old female, with past history as noted above, including recent umbilical hernia repair, abdominoplasty and mastopexy (POD# 2 to 3), being managed for pulmonary embolism.  Presented with report of syncope.  Borderline tachycardic at times.  History of uncle with pulmonary embolism.  Cause of PE likely multifactorial: sedentary state, recent surgeries, current hormone therapy.  On heparin drip at present, but will need transition to NOAC.  No complaints relative to surgical sites; continue to PTA abx to complete course.  Ensure optimal pain control.  Ensure optimal hydration.  F/up repeat lab-work in the AM.    All other management as prescribed.

## 2021-09-03 NOTE — H&P ADULT - PROBLEM SELECTOR PLAN 4
- c/w on home synthroid 112mcg and liothyronine 5 mcg - TSH 1.22  - c/w on home synthroid 112mcg and liothyronine 5 mcg - TSH 1.22  - c/w on home synthroid 112mcg

## 2021-09-03 NOTE — PROGRESS NOTE ADULT - SUBJECTIVE AND OBJECTIVE BOX
Pt seen and evaluated at bedside this AM. No o/n events. Feels better. Breathing fine. Has some pain at surgical site, but well controlled. Tolerating PO intake. No HA/vision changes. Ambulating fine with assist (limited due to surgical pain).  ROS: All other systems negative.    VS: 105/69, 98, 98.4F, 99% RA.  Appeared comfortable, NAD. Speaking full sentences.  CTABL. Good effort. No wheezes.  RRR, S1S2, no g/r/m. No LE edema.  Soft abd, appropriate tender @ surgical site. ND, +BSx4. Bilateral drains noted w/ blood output (per pt improved- clearer and less output).  No C/C. Moves all extermities.  CN II-XII intact. SPeech fluent/face symmetric.    MEDICATIONS  (STANDING):  apixaban 10 milliGRAM(s) Oral every 12 hours  ciprofloxacin     Tablet 500 milliGRAM(s) Oral every 12 hours  levothyroxine 112 MICROGram(s) Oral daily  potassium phosphate / sodium phosphate Tablet (K-PHOS No. 2) 1 Tablet(s) Oral three times a day with meals  senna 2 Tablet(s) Oral at bedtime    MEDICATIONS  (PRN):  aluminum hydroxide/magnesium hydroxide/simethicone Suspension 30 milliLiter(s) Oral once PRN Dyspepsia  bisacodyl 5 milliGRAM(s) Oral at bedtime PRN Constipation  melatonin 3 milliGRAM(s) Oral at bedtime PRN Insomnia  ondansetron Injectable 4 milliGRAM(s) IV Push every 8 hours PRN Nausea and/or Vomiting  oxycodone    5 mG/acetaminophen 325 mG 1 Tablet(s) Oral every 6 hours PRN Moderate Pain (4 - 6)  oxycodone    5 mG/acetaminophen 325 mG 2 Tablet(s) Oral every 6 hours PRN Severe Pain (7 - 10)                              9.8    6.12  )-----------( 275      ( 03 Sep 2021 06:54 )             30.3     09-03    138  |  104  |  11  ----------------------------<  123<H>  3.5   |  25  |  0.60    Ca    8.0<L>      03 Sep 2021 06:54  Phos  2.3     09-03  Mg     1.80     09-03    TPro  6.4  /  Alb  3.3  /  TBili  <0.2  /  DBili  x   /  AST  23  /  ALT  15  /  AlkPhos  70  09-02    PT/INR - ( 02 Sep 2021 13:18 )   PT: 12.6 sec;   INR: 1.10 ratio         PTT - ( 03 Sep 2021 08:12 )  PTT:86.8 sec          COVID-19 PCR: NotDetec (02 Sep 2021 19:04)            CTA reviewed. Acute PE w/ known old liver cyst.      A/P:  56F with PMH of hypothyroidism, GERD s/p hysterectomy on topical hormone s/p recent hernia repair/mastoplexy/abdominoplasty who presents s/p syncopal episode.    #Acute PE, likely provoked  #Syncope  #Post-operative blood loss anemia    -PE likely provoked in setting of recent surgery. Pt does report topical hormone use (progesterone/estrogen based). Advised her to follow up with her Gyn provider to discuss dosing of therapy. She is using because she is s/p hysterectomy.  -DC heparin drip, start Eliquis loading dose. Will continue Rx as OP.   -Syncope likely provoked by PE. No significant tele/EKG issues. Ambulatory, orthostatics negative.   -Plastic recs noted. F/u as OP.    Stable for DC home w/ close OP follow up. 35 minutes spent preparing DC, counseling pt, and coordination of care.  Pt seen and evaluated at bedside this AM. No o/n events. Feels better. Breathing fine. Has some pain at surgical site, but well controlled. Tolerating PO intake. No HA/vision changes. Ambulating fine with assist (limited due to surgical pain).  ROS: All other systems negative.    VS: 105/69, 98, 98.4F, 99% RA.  Appeared comfortable, NAD. Speaking full sentences.  CTABL. Good effort. No wheezes.  RRR, S1S2, no g/r/m. No LE edema.  Soft abd, appropriate tender @ surgical site. ND, +BSx4. Bilateral drains noted w/ blood output (per pt improved- clearer and less output).  No C/C. Moves all extermities.  CN II-XII intact. SPeech fluent/face symmetric.    MEDICATIONS  (STANDING):  apixaban 10 milliGRAM(s) Oral every 12 hours  ciprofloxacin     Tablet 500 milliGRAM(s) Oral every 12 hours  levothyroxine 112 MICROGram(s) Oral daily  potassium phosphate / sodium phosphate Tablet (K-PHOS No. 2) 1 Tablet(s) Oral three times a day with meals  senna 2 Tablet(s) Oral at bedtime    MEDICATIONS  (PRN):  aluminum hydroxide/magnesium hydroxide/simethicone Suspension 30 milliLiter(s) Oral once PRN Dyspepsia  bisacodyl 5 milliGRAM(s) Oral at bedtime PRN Constipation  melatonin 3 milliGRAM(s) Oral at bedtime PRN Insomnia  ondansetron Injectable 4 milliGRAM(s) IV Push every 8 hours PRN Nausea and/or Vomiting  oxycodone    5 mG/acetaminophen 325 mG 1 Tablet(s) Oral every 6 hours PRN Moderate Pain (4 - 6)  oxycodone    5 mG/acetaminophen 325 mG 2 Tablet(s) Oral every 6 hours PRN Severe Pain (7 - 10)                              9.8    6.12  )-----------( 275      ( 03 Sep 2021 06:54 )             30.3     09-03    138  |  104  |  11  ----------------------------<  123<H>  3.5   |  25  |  0.60    Ca    8.0<L>      03 Sep 2021 06:54  Phos  2.3     09-03  Mg     1.80     09-03    TPro  6.4  /  Alb  3.3  /  TBili  <0.2  /  DBili  x   /  AST  23  /  ALT  15  /  AlkPhos  70  09-02    PT/INR - ( 02 Sep 2021 13:18 )   PT: 12.6 sec;   INR: 1.10 ratio         PTT - ( 03 Sep 2021 08:12 )  PTT:86.8 sec          COVID-19 PCR: NotDetec (02 Sep 2021 19:04)            CTA reviewed. Acute PE w/ known old liver cyst.      A/P:  56F with PMH of hypothyroidism, GERD s/p hysterectomy on topical hormone s/p recent hernia repair/mastoplexy/abdominoplasty who presents s/p syncopal episode.    #Acute PE, likely provoked  #Syncope  #Post-operative blood loss anemia  #Hypophosphatema    -PE likely provoked in setting of recent surgery. Pt does report topical hormone use (progesterone/estrogen based). Advised her to follow up with her Gyn provider to discuss dosing of therapy. She is using because she is s/p hysterectomy.  -DC heparin drip, start Eliquis loading dose. Will continue Rx as OP.   -Syncope likely provoked by PE. No significant tele/EKG issues. Ambulatory, orthostatics negative.   -lyte repletion as ordered.  -Plastic recs noted. F/u as OP.    Stable for DC home w/ close OP follow up. 35 minutes spent preparing DC, counseling pt, and coordination of care.

## 2021-09-03 NOTE — H&P ADULT - NSICDXFAMILYHX_GEN_ALL_CORE_FT
FAMILY HISTORY:  Uncle  Still living? Unknown  FH: pulmonary embolism, Age at diagnosis: Age Unknown

## 2021-09-03 NOTE — H&P ADULT - PROBLEM SELECTOR PLAN 5
Pt reports being on testosterone, progesterone, and estrogen. Following with Dr. Mckenzie.   - contact in AM Hysterectomy 1/2021. Pt reports being on testosterone, progesterone, and estrogen. Following with Dr. Mckenzie.   - contact in AM

## 2021-09-03 NOTE — H&P ADULT - PROBLEM SELECTOR PLAN 2
POD2 of abdominoplasty, umbilical hernia repair, and mastopexy.  - plastic surgery assessed pt; no acute intervention   - pt dc'd on oxycodone 5, tylenol, ciprofloxacin 500mg for 5 days POD2 of abdominoplasty, umbilical hernia repair, and mastopexy.  - plastic surgery assessed pt; no acute intervention   - pt dc'd after surgery on oxycodone 5, tylenol, ciprofloxacin 500mg for 5 days POD2 of abdominoplasty, umbilical hernia repair, and mastopexy.  - plastic surgery assessed pt; no acute intervention   - pt dc'd after surgery on oxycodone 5, tylenol, ciprofloxacin 500mg for 5 days  - c/w with oxycodone and tylenol for pain control   - c/w ciprofloxacin 500mg; give for total 6 days (9/1-9/6) as pt missed a dose POD2 of abdominoplasty, umbilical hernia repair, and mastopexy.  - plastic surgery assessed pt; no acute intervention   - pt dc'd after surgery on oxycodone 5, tylenol, ciprofloxacin 500mg for 5 days  - percocet for pain control  - c/w ciprofloxacin 500mg; give for total 6 days (9/1-9/6) as pt missed a dose POD2 of abdominoplasty, umbilical hernia repair, and mastopexy.  - plastic surgery assessed pt; no acute intervention   - pt dc'd after surgery on oxycodone 5, tylenol, ciprofloxacin 500mg for 5 days  - percocet for pain control  - c/w ciprofloxacin 500mg BID; give for total 6 days (9/1-9/6) as pt missed a dose POD2 of abdominoplasty, umbilical hernia repair, and mastopexy.  - plastic surgery assessed pt; no acute intervention   - pt dc'd after surgery on oxycodone 5, Tylenol, ciprofloxacin 500mg for 5 days  - percocet for pain control  - c/w ciprofloxacin 500mg BID; give for total 6 days (9/1-9/6) as pt missed a dose

## 2021-09-03 NOTE — DISCHARGE NOTE NURSING/CASE MANAGEMENT/SOCIAL WORK - NSDCPEFALRISK_GEN_ALL_CORE
For information on Fall & injury Prevention, visit [email or web address]

## 2021-09-03 NOTE — DISCHARGE NOTE PROVIDER - HOSPITAL COURSE
55yo F with PMH hypothyroidism, GERD, hysterectomy 1/2021 on hormone therapy, as well as abdominoplasty, umbilical hernia repair, and mastopexy 8/31/2021 presenting with episode of unresponsiveness at home. Pt reports going to the bathroom to urinate when the episode occurred. Pt reports hearing  calling out to her but being unable to respond to him; does not remember ambulance trip. Pt reports  caught her when she fell off the toilet; denies fall or trauma.     CT Chest revealed PE, started on Heparin gtt, transitioned to Eliquis loading dose.  PE likely provoked in setting of recent surgery.  However, pt does report topical hormone use (progesterone/estrogen based) 2/2 hysterectomy, will follow up outpatient with her Gyn provider to discuss dosing of therapy.  Syncope likely provoked by PE, no significant tele/EKG issues, pt now ambulatory, orthostatics negative.  Stable for DC home w/ close OP follow up w/ PCP and surgeon.    Patient seen and evaluated. Reviewed discharge medications with patient and attending. All new medications requiring new prescriptions were sent to the pharmacy of patient's choice. Reviewed need for prescription for previous home medications and new prescriptions sent if requested. Medically cleared/stable for discharge home as per Dr. Mack on 9/3/2021 with appropriate follow up. Patient understands and agrees with plan of care.

## 2021-09-03 NOTE — H&P ADULT - NSHPREVIEWOFSYSTEMS_GEN_ALL_CORE
REVIEW OF SYSTEMS:  CONSTITUTIONAL: No fever, chills, night sweats, or fatigue  EYES: No eye pain, visual disturbances, or discharge  ENMT:  No difficulty hearing, tinnitus, vertigo; No sinus or throat pain  NECK: No pain or stiffness  RESPIRATORY: No cough, wheezing, or hemoptysis; No shortness of breath  CARDIOVASCULAR: No chest pain, palpitations, dizziness, or leg swelling  GASTROINTESTINAL: ++ pain at surgical sites. No nausea, vomiting, or hematemesis; No diarrhea or constipation. No melena or hematochezia.  GENITOURINARY: No dysuria, frequency, hematuria, or incontinence  NEUROLOGICAL: No headaches, memory loss, loss of strength, numbness, or tremors  SKIN: No itching, burning, rashes, or lesions   LYMPH NODES: No enlarged glands  ENDOCRINE: No heat or cold intolerance; No hair loss  MUSCULOSKELETAL: No joint pain or swelling; No muscle, back, or extremity pain  PSYCHIATRIC: No depression, anxiety, mood swings, or difficulty sleeping  HEME/LYMPH: No easy bruising, or bleeding gums  ALLERGY AND IMMUNOLOGIC: No hives or eczema

## 2021-09-03 NOTE — H&P ADULT - HISTORY OF PRESENT ILLNESS
57yo F with PMH hypothyroidism, GERD, hysterectomy 1/2021 on hormone therapy, as well as abdominoplasty, umbilical hernia repair, and mastopexy 8/31/2021 presenting with episode of unresponsiveness at home. Pt reports going to the bathroom to urinate when the episode occurred. Pt reports hearing  calling out to her but being unable to respond to him; does not remember ambulance trip. Pt reports  caught her when she fell off the toilet; denies fall or trauma. Before the episode pt reports eating breakfast and taking home medications; took oxycodone and valium for the surgical pain. Denies chest pain, SOB, nausea, vomiting, fevers. Reporting pain from the surgeries.     ED course:  On arrival, pt T 98, HR 89, /84, RR 18, O2 sat 18. CXR clear. CT angio showed acute RUL segmental PE and post-surgical changes in abdomen/pelvis, no acute pathology. Pt started on heparin infusion. 55yo F with PMH hypothyroidism, GERD, hysterectomy 1/2021 on hormone therapy, as well as abdominoplasty, umbilical hernia repair, and mastopexy 8/31/2021 presenting with episode of unresponsiveness at home. Pt reports going to the bathroom to urinate when the episode occurred. Pt reports hearing  calling out to her but being unable to respond to him; does not remember ambulance trip. Pt reports  caught her when she fell off the toilet; denies fall or trauma. Before the episode pt reports eating breakfast and taking home medications; took oxycodone and valium for the surgical pain. Denies chest pain, SOB, nausea, vomiting, fevers. Reporting pain from the surgeries.     ED course:  On arrival, pt T 98, HR 89, /84, RR 18, O2 sat 100%. CXR clear. CT angio showed acute RUL segmental PE and post-surgical changes in abdomen/pelvis, no acute pathology. Pt started on heparin infusion.

## 2021-09-03 NOTE — DISCHARGE NOTE NURSING/CASE MANAGEMENT/SOCIAL WORK - NSDCPEELIQUISDIET_GEN_ALL_CORE
Eat healthy foods you enjoy. Apixaban/Eliquis DOES NOT have a special diet. Limit your alcohol intake.
3.929

## 2021-09-03 NOTE — H&P ADULT - NSHPPHYSICALEXAM_GEN_ALL_CORE
VITALS:   T(C): 36.8 (09-02-21 @ 22:31), Max: 36.9 (09-02-21 @ 11:37)  HR: 100 (09-02-21 @ 22:31) (86 - 100)  BP: 127/86 (09-02-21 @ 22:31) (102/68 - 127/86)  RR: 17 (09-02-21 @ 22:31) (17 - 21)  SpO2: 97% (09-02-21 @ 22:31) (97% - 100%)    GENERAL: NAD, lying in bed comfortably  HEAD:  Atraumatic, normocephalic  EYES: EOMI, PERRLA, conjunctiva and sclera clear  ENT: Moist mucous membranes  NECK: Supple, no JVD  HEART: Regular rate and rhythm, no murmurs, rubs, or gallops  LUNGS: limited exam due to pt; upper lobes clear to auscultation   ABDOMEN: limited exam due to pt; abdominal binder present with b/l drains with serosanguinous fluid  EXTREMITIES: 2+ peripheral pulses bilaterally. No clubbing, cyanosis, or edema  NERVOUS SYSTEM:  A&Ox3, no focal deficits   SKIN: No rashes or lesions VITALS:   T(C): 36.8 (09-02-21 @ 22:31), Max: 36.9 (09-02-21 @ 11:37)  HR: 100 (09-02-21 @ 22:31) (86 - 100)  BP: 127/86 (09-02-21 @ 22:31) (102/68 - 127/86)  RR: 17 (09-02-21 @ 22:31) (17 - 21)  SpO2: 97% (09-02-21 @ 22:31) (97% - 100%)    GENERAL: NAD, lying in bed comfortably  HEAD:  Atraumatic, normocephalic  EYES: EOMI, PERRL, conjunctiva and sclera clear  ENT: Moist mucous membranes  NECK: Supple, no JVD  HEART: Regular rate and rhythm, no murmurs, rubs, or gallops  LUNGS: limited exam due to pt; upper lobes clear to auscultation   ABDOMEN: limited exam due to pt; abdominal binder present with b/l drains with serosanguinous fluid  EXTREMITIES: 2+ peripheral pulses bilaterally. No clubbing, cyanosis, or edema  NERVOUS SYSTEM:  A&Ox3, no focal deficits   SKIN: No rashes or lesions

## 2021-09-03 NOTE — DISCHARGE NOTE PROVIDER - NSDCCPCAREPLAN_GEN_ALL_CORE_FT
PRINCIPAL DISCHARGE DIAGNOSIS  Diagnosis: Pulmonary embolism  Assessment and Plan of Treatment: You were diagnosed with a blood clot in your lungs during your hospitalization.  You were started on a blood thinner (anticoagulant).  Please monitor for any signs of bleeding and bruising and report these to your primary care provider.  Please follow up with your primary care provider and your surgeon within one week of discharge for further management.      SECONDARY DISCHARGE DIAGNOSES  Diagnosis: History of hysterectomy  Assessment and Plan of Treatment: You have a history of hysterectomy and you take hormone replacement therapy.  Please follow up with your gynecologist within one week of discharge to discuss if any dose adjustments are needed, given new pulmonary embolism.    Diagnosis: Status post abdominoplasty  Assessment and Plan of Treatment: Please follow up with your surgeon within one week of discharge for further management.    Diagnosis: Vitamin D deficiency  Assessment and Plan of Treatment: Your Vitamin D level was 17.  Continue your medication as prescribed and follow up with your primary care provider within 1 month for recheck and further management.    Diagnosis: Hypothyroidism  Assessment and Plan of Treatment: You have a history of hypothyroidism, and on this admission your thyroid levels were normal (TSH 1.22).  Continue your medications as prescribed and follow up with your primary care provider for further management.

## 2021-09-03 NOTE — H&P ADULT - NSHPLABSRESULTS_GEN_ALL_CORE
LABS:                           10.8   6.64  )-----------( 261      ( 02 Sep 2021 13:18 )             33.0     09-02    136  |  100  |  14  ----------------------------<  110<H>  4.0   |  26  |  0.68    Ca    8.4      02 Sep 2021 13:18    TPro  6.4  /  Alb  3.3  /  TBili  <0.2  /  DBili  x   /  AST  23  /  ALT  15  /  AlkPhos  70  09-02        PT/INR - ( 02 Sep 2021 13:18 )   PT: 12.6 sec;   INR: 1.10 ratio         PTT - ( 02 Sep 2021 13:18 )  PTT:25.4 sec          LIVER FUNCTIONS - ( 02 Sep 2021 13:18 )  Alb: 3.3 g/dL / Pro: 6.4 g/dL / ALK PHOS: 70 U/L / ALT: 15 U/L / AST: 23 U/L / GGT: x                 I&O's Summary         / Troponin T, High Sensitivity Result: <6 ng/L (09-02-21 @ 13:18)      CAPILLARY BLOOD GLUCOSE                MICRO:

## 2021-09-03 NOTE — H&P ADULT - PROBLEM SELECTOR PLAN 1
Pt POD#2 of abdominoplasty, umbilical hernia repair, and mastopexy.  - CT angio showed acute RUL segmental PE  - on heparin infusion   - monitor respiratory status, currently RR 18 O2 sat 100% Pt POD#2 of abdominoplasty, umbilical hernia repair, and mastopexy.  - FMH of PE in uncle  - likely provoked based on current hormone therapy and multiple recent surgeries  - CT angio showed acute RUL segmental PE  - on heparin infusion   - monitor respiratory status, currently RR 18 O2 sat 100%  - consider transition to apixaban or rivaroxaban as pt is anxious to go home Pt POD#2 of abdominoplasty, umbilical hernia repair, and mastopexy.  - FMH of PE in uncle  - likely provoked based on current hormone therapy and multiple recent surgeries, and likely sedentary state  - CT angio showed acute RUL segmental PE  - on heparin infusion   - monitor respiratory status, currently RR 18 O2 sat 100%  - consider transition to apixaban or rivaroxaban as pt is anxious to go home

## 2021-09-03 NOTE — DISCHARGE NOTE NURSING/CASE MANAGEMENT/SOCIAL WORK - PATIENT PORTAL LINK FT
You can access the FollowMyHealth Patient Portal offered by [facility] by registering at the following website: [email or web address]. By joining [employer]’s FollowMyHealth portal, you will also be able to view your health information using other applications (apps) compatible with our system.

## 2021-09-03 NOTE — H&P ADULT - PROBLEM SELECTOR PLAN 3
Hgb10.8 on admission likely 2/2 surgeries 8/31.\  - trend CBC  - CTM Hgb10.8 on admission likely 2/2 surgeries 8/31.  - trend CBC  - CTM Normocytic anemia, Hgb10.8 on admission likely 2/2 surgeries 8/31.  - trend CBC  - CTM

## 2021-09-03 NOTE — DISCHARGE NOTE PROVIDER - CARE PROVIDER_API CALL
Your Primary Care Provider,   Phone: (   )    -  Fax: (   )    -  Follow Up Time:     Your Surgeon,   Phone: (   )    -  Fax: (   )    -  Follow Up Time:     Your Gynecologist,   Phone: (   )    -  Fax: (   )    -  Follow Up Time:

## 2021-09-03 NOTE — DISCHARGE NOTE PROVIDER - PROVIDER TOKENS
FREE:[LAST:[Your Primary Care Provider],PHONE:[(   )    -],FAX:[(   )    -]],FREE:[LAST:[Your Surgeon],PHONE:[(   )    -],FAX:[(   )    -]],FREE:[LAST:[Your Gynecologist],PHONE:[(   )    -],FAX:[(   )    -]]

## 2021-09-04 ENCOUNTER — TRANSCRIPTION ENCOUNTER (OUTPATIENT)
Age: 56
End: 2021-09-04

## 2021-09-04 VITALS
WEIGHT: 154.1 LBS | OXYGEN SATURATION: 99 % | SYSTOLIC BLOOD PRESSURE: 97 MMHG | HEIGHT: 63 IN | TEMPERATURE: 98 F | DIASTOLIC BLOOD PRESSURE: 60 MMHG | HEART RATE: 104 BPM | RESPIRATION RATE: 19 BRPM

## 2021-09-04 PROCEDURE — 93010 ELECTROCARDIOGRAM REPORT: CPT

## 2021-09-04 PROCEDURE — 99285 EMERGENCY DEPT VISIT HI MDM: CPT | Mod: CS

## 2021-09-04 NOTE — ED PROVIDER NOTE - PHYSICAL EXAMINATION
VITAL SIGNS: I have reviewed nursing notes and confirm.  CONSTITUTIONAL: Non toxic; in no acute distress.   SKIN:  Warm and dry, no acute rash.   HEAD:  Normocephalic, atraumatic.  EYES: PERRL.  EOM intact; conjunctiva and sclera clear.  ENT: No nasal discharge; airway clear.   NECK: Supple; non tender.  CARD: S1, S2 normal; no murmurs, gallops, or rubs. Regular rate and rhythm.   RESP:  Clear to auscultation b/l, no wheezes, rales or rhonchi.  ABD: Normal bowel sounds; + distended, + post operative dressings in place, + bilateral J/P drains in place with bloody drainage.  + Diffuse tenderness to palpation throughout abdomen.   EXT: Normal ROM. No clubbing, cyanosis or edema. 2+ pulses to b/l ue/le.  NEURO: Alert, oriented, grossly unremarkable.  5/5 strength x 4 extremities against gravity and external force.  No drift x 4 extremities.  Sensation intact and symmetric x 4 extremities.  No facial asymmetry.    PSYCH: Cooperative, mood and affect appropriate.

## 2021-09-04 NOTE — ED ADULT NURSE NOTE - OTHER COMPLAINTS
transfer from [facility], abdominoplasty on Tuesday, had 1 syncopal episode after d/c and went to [facility], found to have DVT and started on Eliquis, d/c home. On 9/3, pt had another syncopal episode and went to [facility], tranferred to [facility] per Dr Baires (). R and L ALOK in place.

## 2021-09-04 NOTE — ED ADULT NURSE NOTE - OBJECTIVE STATEMENT
Patient transferred from [facility] of abdominoplasty last Tuesday,, DC home Wednesday, had a syncopal episode Thursday, taken to another facility diagnosed with DVT, placed on Eliquis discharged two days ago and suffered another syncopal episode today, patient has b/l abd ALOK drains, in place.

## 2021-09-04 NOTE — ED PROVIDER NOTE - CLINICAL SUMMARY MEDICAL DECISION MAKING FREE TEXT BOX
Patient in ED as transfer from OSH 2/2 concern for post operative bleeding - transfer requested by Dr. Trevizo as plan for OR.  Patient awake, alert and complains only of abd discomfort on arrival to ED.  Dr. Trevizo in ED requesting pre op labs and admission to Dr. Trevizo, tele with plan for OR tonight.  Plastics team to follow labs, will admit at this time.

## 2021-09-04 NOTE — ED ADULT TRIAGE NOTE - OTHER COMPLAINTS
transfer from [facility], abdominoplasty on Tuesday, had 1 syncopal episode after d/c and went to [facility], found to have DVT and started on Eliquis, d/c home. On 9/3, pt had another syncopal episode and went to [facility], tranferred to [facility] per Dr Baires (). R and L ALOK in place.

## 2021-09-04 NOTE — ED PROVIDER NOTE - OBJECTIVE STATEMENT
56 year old female with history of abdominoplasty with Dr. Juarez on 8/31 presents to ED as transfer from OSH s/p syncopal episode today.  Patient was transferred to [facility] for continuity of care at Dr. Juarez's request.  Patient notes she passed out today while in seat position, no injury/hit to head.  Patient states she had similar episode several days prior, at which point she was found to have RUL PE, started on Eliquis which she has been taking as prescribed.  Patient also notes increased discomfort to abdomen and distension.  Dr. Juarez in ED on patient's arrival to [facility] 2/2 concern for possible bleeding to abdomen causing distension in setting of recently started A/C.  Patient admits to associated mild SOB intermittently which she has been experiencing following diagnosis of PE.  Patient denies associated fever, chills, headache, visual changes, chest pain, abdominal pain, nausea, emesis, changes to bowel movements, peripheral edema, calf pain/tenderness, recent travel, known sick contacts or any additional acute complaints or concerns at this time.

## 2021-09-04 NOTE — ED PROVIDER NOTE - NS ED ROS FT
Constitutional: No fever or chills.   Eyes: No pain, blurry vision, or discharge.  ENMT: No hearing changes, pain, discharge or infections. No neck pain or stiffness.  Cardiac: No chest pain, + intermittent SOB, no edema. No chest pain with exertion.  Respiratory: + Intermittent SOB.  No cough or respiratory distress. No hemoptysis. No history of asthma or RAD.  GI: No nausea, vomiting, diarrhea, + abdominal pain.  : No dysuria, frequency or burning.  MS: No myalgia, muscle weakness, joint pain or back pain.  Neuro: No headache or weakness. + Syncopal episode.  Skin: No skin rash.   Endocrine: No history of thyroid disease or diabetes.  Except as documented in the HPI, all other systems are negative.

## 2021-09-05 ENCOUNTER — INPATIENT (INPATIENT)
Facility: HOSPITAL | Age: 56
LOS: 2 days | Discharge: HOME | DRG: 907 | End: 2021-09-08
Attending: PLASTIC SURGERY | Admitting: PLASTIC SURGERY
Payer: COMMERCIAL

## 2021-09-05 DIAGNOSIS — I26.99 OTHER PULMONARY EMBOLISM WITHOUT ACUTE COR PULMONALE: ICD-10-CM

## 2021-09-05 DIAGNOSIS — Z98.890 OTHER SPECIFIED POSTPROCEDURAL STATES: Chronic | ICD-10-CM

## 2021-09-05 DIAGNOSIS — I80.10 PHLEBITIS AND THROMBOPHLEBITIS OF UNSPECIFIED FEMORAL VEIN: ICD-10-CM

## 2021-09-05 DIAGNOSIS — Z90.49 ACQUIRED ABSENCE OF OTHER SPECIFIED PARTS OF DIGESTIVE TRACT: Chronic | ICD-10-CM

## 2021-09-05 LAB
ALBUMIN SERPL ELPH-MCNC: 2.9 G/DL — LOW (ref 3.3–5)
ALP SERPL-CCNC: 63 U/L — SIGNIFICANT CHANGE UP (ref 40–120)
ALT FLD-CCNC: 21 U/L — SIGNIFICANT CHANGE UP (ref 10–45)
ANION GAP SERPL CALC-SCNC: 6 MMOL/L — SIGNIFICANT CHANGE UP (ref 5–17)
ANION GAP SERPL CALC-SCNC: 9 MMOL/L — SIGNIFICANT CHANGE UP (ref 5–17)
APPEARANCE UR: CLEAR — SIGNIFICANT CHANGE UP
APTT BLD: 26.1 SEC — LOW (ref 27.5–35.5)
APTT BLD: 38.2 SEC — HIGH (ref 27.5–35.5)
AST SERPL-CCNC: 31 U/L — SIGNIFICANT CHANGE UP (ref 10–40)
BACTERIA # UR AUTO: SIGNIFICANT CHANGE UP /HPF
BASOPHILS # BLD AUTO: 0.01 K/UL — SIGNIFICANT CHANGE UP (ref 0–0.2)
BASOPHILS NFR BLD AUTO: 0.1 % — SIGNIFICANT CHANGE UP (ref 0–2)
BILIRUB SERPL-MCNC: 0.2 MG/DL — SIGNIFICANT CHANGE UP (ref 0.2–1.2)
BILIRUB UR-MCNC: NEGATIVE — SIGNIFICANT CHANGE UP
BUN SERPL-MCNC: 8 MG/DL — SIGNIFICANT CHANGE UP (ref 7–23)
BUN SERPL-MCNC: 8 MG/DL — SIGNIFICANT CHANGE UP (ref 7–23)
CALCIUM SERPL-MCNC: 7.8 MG/DL — LOW (ref 8.4–10.5)
CALCIUM SERPL-MCNC: 8 MG/DL — LOW (ref 8.4–10.5)
CHLORIDE SERPL-SCNC: 105 MMOL/L — SIGNIFICANT CHANGE UP (ref 96–108)
CHLORIDE SERPL-SCNC: 106 MMOL/L — SIGNIFICANT CHANGE UP (ref 96–108)
CO2 SERPL-SCNC: 24 MMOL/L — SIGNIFICANT CHANGE UP (ref 22–31)
CO2 SERPL-SCNC: 26 MMOL/L — SIGNIFICANT CHANGE UP (ref 22–31)
COLOR SPEC: YELLOW — SIGNIFICANT CHANGE UP
CREAT SERPL-MCNC: 0.53 MG/DL — SIGNIFICANT CHANGE UP (ref 0.5–1.3)
CREAT SERPL-MCNC: 0.6 MG/DL — SIGNIFICANT CHANGE UP (ref 0.5–1.3)
DIFF PNL FLD: ABNORMAL
EOSINOPHIL # BLD AUTO: 0.09 K/UL — SIGNIFICANT CHANGE UP (ref 0–0.5)
EOSINOPHIL NFR BLD AUTO: 1.3 % — SIGNIFICANT CHANGE UP (ref 0–6)
EPI CELLS # UR: SIGNIFICANT CHANGE UP /HPF (ref 0–5)
GLUCOSE SERPL-MCNC: 91 MG/DL — SIGNIFICANT CHANGE UP (ref 70–99)
GLUCOSE SERPL-MCNC: 97 MG/DL — SIGNIFICANT CHANGE UP (ref 70–99)
GLUCOSE UR QL: NEGATIVE — SIGNIFICANT CHANGE UP
HCT VFR BLD CALC: 22.3 % — LOW (ref 34.5–45)
HCT VFR BLD CALC: 23 % — LOW (ref 34.5–45)
HCT VFR BLD CALC: 24.6 % — LOW (ref 34.5–45)
HCT VFR BLD CALC: 27.8 % — LOW (ref 34.5–45)
HGB BLD-MCNC: 7.1 G/DL — LOW (ref 11.5–15.5)
HGB BLD-MCNC: 7.5 G/DL — LOW (ref 11.5–15.5)
HGB BLD-MCNC: 8.2 G/DL — LOW (ref 11.5–15.5)
HGB BLD-MCNC: 9.2 G/DL — LOW (ref 11.5–15.5)
IMM GRANULOCYTES NFR BLD AUTO: 0.4 % — SIGNIFICANT CHANGE UP (ref 0–1.5)
INR BLD: 1.17 — HIGH (ref 0.88–1.16)
INR BLD: 1.17 — HIGH (ref 0.88–1.16)
KETONES UR-MCNC: NEGATIVE — SIGNIFICANT CHANGE UP
LEUKOCYTE ESTERASE UR-ACNC: NEGATIVE — SIGNIFICANT CHANGE UP
LIDOCAIN IGE QN: 14 U/L — SIGNIFICANT CHANGE UP (ref 7–60)
LYMPHOCYTES # BLD AUTO: 1.32 K/UL — SIGNIFICANT CHANGE UP (ref 1–3.3)
LYMPHOCYTES # BLD AUTO: 19.4 % — SIGNIFICANT CHANGE UP (ref 13–44)
MAGNESIUM SERPL-MCNC: 1.4 MG/DL — LOW (ref 1.6–2.6)
MCHC RBC-ENTMCNC: 28.9 PG — SIGNIFICANT CHANGE UP (ref 27–34)
MCHC RBC-ENTMCNC: 29.4 PG — SIGNIFICANT CHANGE UP (ref 27–34)
MCHC RBC-ENTMCNC: 30 PG — SIGNIFICANT CHANGE UP (ref 27–34)
MCHC RBC-ENTMCNC: 30.4 PG — SIGNIFICANT CHANGE UP (ref 27–34)
MCHC RBC-ENTMCNC: 31.8 GM/DL — LOW (ref 32–36)
MCHC RBC-ENTMCNC: 32.6 GM/DL — SIGNIFICANT CHANGE UP (ref 32–36)
MCHC RBC-ENTMCNC: 33.1 GM/DL — SIGNIFICANT CHANGE UP (ref 32–36)
MCHC RBC-ENTMCNC: 33.3 GM/DL — SIGNIFICANT CHANGE UP (ref 32–36)
MCV RBC AUTO: 90.1 FL — SIGNIFICANT CHANGE UP (ref 80–100)
MCV RBC AUTO: 90.2 FL — SIGNIFICANT CHANGE UP (ref 80–100)
MCV RBC AUTO: 90.7 FL — SIGNIFICANT CHANGE UP (ref 80–100)
MCV RBC AUTO: 91.7 FL — SIGNIFICANT CHANGE UP (ref 80–100)
MONOCYTES # BLD AUTO: 0.48 K/UL — SIGNIFICANT CHANGE UP (ref 0–0.9)
MONOCYTES NFR BLD AUTO: 7 % — SIGNIFICANT CHANGE UP (ref 2–14)
NEUTROPHILS # BLD AUTO: 4.89 K/UL — SIGNIFICANT CHANGE UP (ref 1.8–7.4)
NEUTROPHILS NFR BLD AUTO: 71.8 % — SIGNIFICANT CHANGE UP (ref 43–77)
NITRITE UR-MCNC: NEGATIVE — SIGNIFICANT CHANGE UP
NRBC # BLD: 0 /100 WBCS — SIGNIFICANT CHANGE UP (ref 0–0)
PH UR: 6.5 — SIGNIFICANT CHANGE UP (ref 5–8)
PLATELET # BLD AUTO: 239 K/UL — SIGNIFICANT CHANGE UP (ref 150–400)
PLATELET # BLD AUTO: 249 K/UL — SIGNIFICANT CHANGE UP (ref 150–400)
PLATELET # BLD AUTO: 254 K/UL — SIGNIFICANT CHANGE UP (ref 150–400)
PLATELET # BLD AUTO: 263 K/UL — SIGNIFICANT CHANGE UP (ref 150–400)
POTASSIUM SERPL-MCNC: 3.6 MMOL/L — SIGNIFICANT CHANGE UP (ref 3.5–5.3)
POTASSIUM SERPL-MCNC: 3.8 MMOL/L — SIGNIFICANT CHANGE UP (ref 3.5–5.3)
POTASSIUM SERPL-SCNC: 3.6 MMOL/L — SIGNIFICANT CHANGE UP (ref 3.5–5.3)
POTASSIUM SERPL-SCNC: 3.8 MMOL/L — SIGNIFICANT CHANGE UP (ref 3.5–5.3)
PROT SERPL-MCNC: 5.2 G/DL — LOW (ref 6–8.3)
PROT UR-MCNC: NEGATIVE MG/DL — SIGNIFICANT CHANGE UP
PROTHROM AB SERPL-ACNC: 13.9 SEC — HIGH (ref 10.6–13.6)
PROTHROM AB SERPL-ACNC: 13.9 SEC — HIGH (ref 10.6–13.6)
RBC # BLD: 2.46 M/UL — LOW (ref 3.8–5.2)
RBC # BLD: 2.55 M/UL — LOW (ref 3.8–5.2)
RBC # BLD: 2.73 M/UL — LOW (ref 3.8–5.2)
RBC # BLD: 3.03 M/UL — LOW (ref 3.8–5.2)
RBC # FLD: 13.9 % — SIGNIFICANT CHANGE UP (ref 10.3–14.5)
RBC # FLD: 13.9 % — SIGNIFICANT CHANGE UP (ref 10.3–14.5)
RBC # FLD: 14.2 % — SIGNIFICANT CHANGE UP (ref 10.3–14.5)
RBC # FLD: 14.4 % — SIGNIFICANT CHANGE UP (ref 10.3–14.5)
RBC CASTS # UR COMP ASSIST: > 10 /HPF
SARS-COV-2 RNA SPEC QL NAA+PROBE: NEGATIVE — SIGNIFICANT CHANGE UP
SODIUM SERPL-SCNC: 137 MMOL/L — SIGNIFICANT CHANGE UP (ref 135–145)
SODIUM SERPL-SCNC: 139 MMOL/L — SIGNIFICANT CHANGE UP (ref 135–145)
SP GR SPEC: 1.01 — SIGNIFICANT CHANGE UP (ref 1–1.03)
TROPONIN T SERPL-MCNC: <0.01 NG/ML — SIGNIFICANT CHANGE UP (ref 0–0.01)
UROBILINOGEN FLD QL: 0.2 E.U./DL — SIGNIFICANT CHANGE UP
WBC # BLD: 10.28 K/UL — SIGNIFICANT CHANGE UP (ref 3.8–10.5)
WBC # BLD: 6.82 K/UL — SIGNIFICANT CHANGE UP (ref 3.8–10.5)
WBC # BLD: 7.84 K/UL — SIGNIFICANT CHANGE UP (ref 3.8–10.5)
WBC # BLD: 9.66 K/UL — SIGNIFICANT CHANGE UP (ref 3.8–10.5)
WBC # FLD AUTO: 10.28 K/UL — SIGNIFICANT CHANGE UP (ref 3.8–10.5)
WBC # FLD AUTO: 6.82 K/UL — SIGNIFICANT CHANGE UP (ref 3.8–10.5)
WBC # FLD AUTO: 7.84 K/UL — SIGNIFICANT CHANGE UP (ref 3.8–10.5)
WBC # FLD AUTO: 9.66 K/UL — SIGNIFICANT CHANGE UP (ref 3.8–10.5)
WBC UR QL: < 5 /HPF — SIGNIFICANT CHANGE UP

## 2021-09-05 PROCEDURE — 99223 1ST HOSP IP/OBS HIGH 75: CPT | Mod: GC

## 2021-09-05 PROCEDURE — 71045 X-RAY EXAM CHEST 1 VIEW: CPT | Mod: 26

## 2021-09-05 RX ORDER — HEPARIN SODIUM 5000 [USP'U]/ML
900 INJECTION INTRAVENOUS; SUBCUTANEOUS
Qty: 25000 | Refills: 0 | Status: DISCONTINUED | OUTPATIENT
Start: 2021-09-05 | End: 2021-09-06

## 2021-09-05 RX ORDER — OXYCODONE AND ACETAMINOPHEN 5; 325 MG/1; MG/1
2 TABLET ORAL EVERY 4 HOURS
Refills: 0 | Status: DISCONTINUED | OUTPATIENT
Start: 2021-09-05 | End: 2021-09-07

## 2021-09-05 RX ORDER — CEFAZOLIN SODIUM 1 G
1000 VIAL (EA) INJECTION EVERY 8 HOURS
Refills: 0 | Status: DISCONTINUED | OUTPATIENT
Start: 2021-09-05 | End: 2021-09-08

## 2021-09-05 RX ORDER — DIAZEPAM 5 MG
5 TABLET ORAL EVERY 6 HOURS
Refills: 0 | Status: DISCONTINUED | OUTPATIENT
Start: 2021-09-05 | End: 2021-09-08

## 2021-09-05 RX ORDER — BUPROPION HYDROCHLORIDE 150 MG/1
150 TABLET, EXTENDED RELEASE ORAL DAILY
Refills: 0 | Status: DISCONTINUED | OUTPATIENT
Start: 2021-09-05 | End: 2021-09-05

## 2021-09-05 RX ORDER — HEPARIN SODIUM 5000 [USP'U]/ML
3000 INJECTION INTRAVENOUS; SUBCUTANEOUS ONCE
Refills: 0 | Status: COMPLETED | OUTPATIENT
Start: 2021-09-05 | End: 2021-09-05

## 2021-09-05 RX ORDER — ONDANSETRON 8 MG/1
4 TABLET, FILM COATED ORAL EVERY 6 HOURS
Refills: 0 | Status: DISCONTINUED | OUTPATIENT
Start: 2021-09-05 | End: 2021-09-08

## 2021-09-05 RX ORDER — LEVOTHYROXINE SODIUM 125 MCG
112 TABLET ORAL DAILY
Refills: 0 | Status: DISCONTINUED | OUTPATIENT
Start: 2021-09-05 | End: 2021-09-08

## 2021-09-05 RX ORDER — HEPARIN SODIUM 5000 [USP'U]/ML
750 INJECTION INTRAVENOUS; SUBCUTANEOUS
Qty: 25000 | Refills: 0 | Status: DISCONTINUED | OUTPATIENT
Start: 2021-09-05 | End: 2021-09-05

## 2021-09-05 RX ORDER — HYDROMORPHONE HYDROCHLORIDE 2 MG/ML
0.5 INJECTION INTRAMUSCULAR; INTRAVENOUS; SUBCUTANEOUS EVERY 4 HOURS
Refills: 0 | Status: DISCONTINUED | OUTPATIENT
Start: 2021-09-05 | End: 2021-09-08

## 2021-09-05 RX ORDER — HYDROMORPHONE HYDROCHLORIDE 2 MG/ML
0.5 INJECTION INTRAMUSCULAR; INTRAVENOUS; SUBCUTANEOUS
Refills: 0 | Status: COMPLETED | OUTPATIENT
Start: 2021-09-05 | End: 2021-09-05

## 2021-09-05 RX ORDER — SODIUM CHLORIDE 9 MG/ML
1000 INJECTION, SOLUTION INTRAVENOUS
Refills: 0 | Status: DISCONTINUED | OUTPATIENT
Start: 2021-09-05 | End: 2021-09-07

## 2021-09-05 RX ORDER — ACETAMINOPHEN 500 MG
325 TABLET ORAL EVERY 4 HOURS
Refills: 0 | Status: DISCONTINUED | OUTPATIENT
Start: 2021-09-05 | End: 2021-09-08

## 2021-09-05 RX ORDER — HEPARIN SODIUM 5000 [USP'U]/ML
1200 INJECTION INTRAVENOUS; SUBCUTANEOUS
Qty: 25000 | Refills: 0 | Status: DISCONTINUED | OUTPATIENT
Start: 2021-09-05 | End: 2021-09-05

## 2021-09-05 RX ORDER — BUPROPION HYDROCHLORIDE 150 MG/1
450 TABLET, EXTENDED RELEASE ORAL DAILY
Refills: 0 | Status: DISCONTINUED | OUTPATIENT
Start: 2021-09-05 | End: 2021-09-08

## 2021-09-05 RX ORDER — OXYCODONE AND ACETAMINOPHEN 5; 325 MG/1; MG/1
1 TABLET ORAL EVERY 4 HOURS
Refills: 0 | Status: DISCONTINUED | OUTPATIENT
Start: 2021-09-05 | End: 2021-09-07

## 2021-09-05 RX ORDER — ONDANSETRON 8 MG/1
4 TABLET, FILM COATED ORAL EVERY 4 HOURS
Refills: 0 | Status: DISCONTINUED | OUTPATIENT
Start: 2021-09-05 | End: 2021-09-05

## 2021-09-05 RX ORDER — ONDANSETRON 8 MG/1
4 TABLET, FILM COATED ORAL EVERY 6 HOURS
Refills: 0 | Status: DISCONTINUED | OUTPATIENT
Start: 2021-09-05 | End: 2021-09-05

## 2021-09-05 RX ADMIN — Medication 100 MILLIGRAM(S): at 16:56

## 2021-09-05 RX ADMIN — OXYCODONE AND ACETAMINOPHEN 1 TABLET(S): 5; 325 TABLET ORAL at 07:45

## 2021-09-05 RX ADMIN — HYDROMORPHONE HYDROCHLORIDE 0.5 MILLIGRAM(S): 2 INJECTION INTRAMUSCULAR; INTRAVENOUS; SUBCUTANEOUS at 05:01

## 2021-09-05 RX ADMIN — Medication 5 MILLIGRAM(S): at 23:29

## 2021-09-05 RX ADMIN — HYDROMORPHONE HYDROCHLORIDE 0.5 MILLIGRAM(S): 2 INJECTION INTRAMUSCULAR; INTRAVENOUS; SUBCUTANEOUS at 05:27

## 2021-09-05 RX ADMIN — HEPARIN SODIUM 3000 UNIT(S): 5000 INJECTION INTRAVENOUS; SUBCUTANEOUS at 12:52

## 2021-09-05 RX ADMIN — OXYCODONE AND ACETAMINOPHEN 1 TABLET(S): 5; 325 TABLET ORAL at 18:08

## 2021-09-05 RX ADMIN — Medication 100 MILLIGRAM(S): at 22:10

## 2021-09-05 RX ADMIN — HEPARIN SODIUM 12 UNIT(S)/HR: 5000 INJECTION INTRAVENOUS; SUBCUTANEOUS at 12:52

## 2021-09-05 RX ADMIN — HEPARIN SODIUM 9 UNIT(S)/HR: 5000 INJECTION INTRAVENOUS; SUBCUTANEOUS at 22:10

## 2021-09-05 RX ADMIN — BUPROPION HYDROCHLORIDE 450 MILLIGRAM(S): 150 TABLET, EXTENDED RELEASE ORAL at 12:29

## 2021-09-05 RX ADMIN — Medication 5 MILLIGRAM(S): at 16:56

## 2021-09-05 RX ADMIN — HEPARIN SODIUM 7.5 UNIT(S)/HR: 5000 INJECTION INTRAVENOUS; SUBCUTANEOUS at 16:44

## 2021-09-05 RX ADMIN — OXYCODONE AND ACETAMINOPHEN 1 TABLET(S): 5; 325 TABLET ORAL at 22:10

## 2021-09-05 RX ADMIN — OXYCODONE AND ACETAMINOPHEN 1 TABLET(S): 5; 325 TABLET ORAL at 09:15

## 2021-09-05 RX ADMIN — OXYCODONE AND ACETAMINOPHEN 1 TABLET(S): 5; 325 TABLET ORAL at 19:08

## 2021-09-05 RX ADMIN — OXYCODONE AND ACETAMINOPHEN 1 TABLET(S): 5; 325 TABLET ORAL at 23:05

## 2021-09-05 RX ADMIN — Medication 100 MILLIGRAM(S): at 07:46

## 2021-09-05 RX ADMIN — HYDROMORPHONE HYDROCHLORIDE 0.5 MILLIGRAM(S): 2 INJECTION INTRAMUSCULAR; INTRAVENOUS; SUBCUTANEOUS at 04:10

## 2021-09-05 RX ADMIN — Medication 112 MICROGRAM(S): at 07:45

## 2021-09-05 NOTE — PATIENT PROFILE ADULT - NSPROGENSOURCEINFO_GEN_A_NUR
Pt admitted to Room 433 for scheduled induction. Admission assessment completed. Discussed plan of care with patient. Discussed pt's choice of infant feeding method. Feeding options explored, including the importance of exclusive breastfeeding. Pt informed of our breastfeeding support system from from nursing staff and lactation staff during inpatient stay and following discharge. Questions and concerns addressed at this time. Pt confirms breastfeeding is her decision at this time. patient

## 2021-09-05 NOTE — CONSULT NOTE ADULT - ASSESSMENT
56F who is s/p abdominoplasty/mastopexy on 8/31/21, complicated by DVT, PE. She was admitted to [facility] on 9/2 after a syncopal event where the DVT/PE was diagnosed. She was treated with heparin and transitioned to Eliquis. Discharged on 9/3. She experienced another syncopal event on 9/4 and was brought to [facility] for evaluation, found to have an abdominal wall hematoma. Now s/p hematoma evacuation on 9/5.  Hematology consulted for AC recommendations.    Acute PE  Provoked in setting of surgery.  Recommend continuing anticoagulation with close monitoring of CBC and abdominal exam.   56F who is s/p abdominoplasty/mastopexy on 8/31/21, complicated by DVT, PE. She was admitted to [facility] on 9/2 after a syncopal event where the DVT/PE was diagnosed. She was treated with heparin and transitioned to Eliquis. Discharged on 9/3. She experienced another syncopal event on 9/4 and was brought to [facility] for evaluation, found to have an abdominal wall hematoma. Now s/p hematoma evacuation on 9/5.  Hematology consulted for AC recommendations.    Acute PE  Provoked in setting of surgery.  Recommend continuing anticoagulation with close monitoring of CBC and abdominal exam  Goal PTT 60-70, if bleeding, decrease goal to 50-60 (2x her normal)  Needs to be therapeutic on Heparin for 48-72 hrs before being transitioned to po AC    D/w  (Hematology Svc Attending).   56F who is s/p abdominoplasty/mastopexy on 8/31/21, complicated by DVT, PE. She was admitted to [facility] on 9/2 after a syncopal event where the DVT/PE was diagnosed. She was treated with heparin and transitioned to Eliquis. Discharged on 9/3. She experienced another syncopal event on 9/4 and was brought to [facility] for evaluation, found to have an abdominal wall hematoma. Now s/p hematoma evacuation on 9/5.  Hematology consulted for AC recommendations.    Acute PE  Provoked in setting of surgery.  Recommend continuing anticoagulation with close monitoring of CBC and abdominal exams  Risk of worsening PE outweighs risk of bleeding  Start heparin gtt, Goal PTT 45-65  Needs to be therapeutic on Heparin for 48-72 hrs before being transitioned to Eliquis  Transfuse PRBC to Hb>7    D/w  (Hematology Svc Attending).

## 2021-09-05 NOTE — PACU DISCHARGE NOTE - COMMENTS
Patient is A&OX4. S/p pain med. VSS. LR at 100cc/hr. Guerrero in place. Tolerated PO ice chips. Lab sent as ordered. Abdomen suprapubic area closed with Dermabond, covered with Prineo dressing abdominal pad, blue clothes and abdominal binder in place. Dry and intact. Bilateral breasrt with pink bra and was done on 8/31. Sites are covered with Dermabond and Prineo dressing. Met PACU requirements. Report given to floor RN. Patient taken down to room by RN and transporter

## 2021-09-05 NOTE — PROGRESS NOTE ADULT - ASSESSMENT
56F s/p abdominoplasty, mastopexy on 8/31 with post-op DVT/PE, anticoagulated and found to have abdominal wall hematoma on 9/4/21 s/p evacuation of hematoma and control of bleeding on 9/5/21  - Discussed case with hematologist. Will initiate heparin gtt with goal PTT 70-90. Must remain monitored for 48hrs prior to transitioning to oral agent  - Pulmonology consult for known PE and hypoxemia on RA  - Hospitalist consult requested per Dr. Trevizo for assistance in management  - Ambulate with assistance (while bent at the waist), SCDs  - Drain care. Please strip/measure/record Q4h  - Beach chair position while in bed, abdominal binder at all times  - Ancef  - recheck CBC given mild tachycardia, cardiac enzymes if persists  - D/C Guerrero catheter  - OK to DC IVF when taking adequate PO

## 2021-09-05 NOTE — H&P ADULT - HISTORY OF PRESENT ILLNESS
Very pleasant 56F who is s/p abdominoplasty/mastopexy on 8/31/21, complicated by DVT, PE. She was admitted to [facility] on 9/2 after a syncopal event where the DVT/PE was diagnosed. She was treated with heparin and transitioned to Eliquis. Discharged on 9/3. She experienced another syncopal event on 9/4 and was brought to [facility] for evaluation, found to have an abdominal wall hematoma. Some mild dyspnea/fatigue. She states that her abdominal ALOK drains have been putting out a significant amount, but cannot say exactly how much.

## 2021-09-05 NOTE — H&P ADULT - NSHPLABSRESULTS_GEN_ALL_CORE
Pre-op labs pending  CT A/P reviewed from outside hospital- large hematoma noted at inferior abdominal wall within subcutaneous tissues

## 2021-09-05 NOTE — CONSULT NOTE ADULT - SUBJECTIVE AND OBJECTIVE BOX
HEMATOLOGY CONSULT NOTE  56F who is s/p abdominoplasty/mastopexy on 8/31/21, complicated by DVT, PE. She was admitted to [facility] on 9/2 after a syncopal event where the DVT/PE was diagnosed. She was treated with heparin and transitioned to Eliquis. Discharged on 9/3. She experienced another syncopal event on 9/4 and was brought to [facility] for evaluation, found to have an abdominal wall hematoma. Some mild dyspnea/fatigue. She states that her abdominal ALOK drains have been putting out a significant amount, but cannot say exactly how much.  (05 Sep 2021 00:29)        Allergies    No Known Allergies    Intolerances        MEDICATIONS  (STANDING):  buPROPion XL (24-Hour) . 450 milliGRAM(s) Oral daily  ceFAZolin   IVPB 1000 milliGRAM(s) IV Intermittent every 8 hours  heparin  Infusion 750 Unit(s)/Hr (7.5 mL/Hr) IV Continuous <Continuous>  lactated ringers. 1000 milliLiter(s) (100 mL/Hr) IV Continuous <Continuous>  levothyroxine 112 MICROGram(s) Oral daily    MEDICATIONS  (PRN):  acetaminophen   Tablet .. 325 milliGRAM(s) Oral every 4 hours PRN Mild Pain (1 - 3)  diazepam    Tablet 5 milliGRAM(s) Oral every 6 hours PRN spasm  HYDROmorphone  Injectable 0.5 milliGRAM(s) IV Push every 4 hours PRN breakthrough pain  ondansetron    Tablet 4 milliGRAM(s) Oral every 6 hours PRN Nausea  oxycodone    5 mG/acetaminophen 325 mG 1 Tablet(s) Oral every 4 hours PRN Moderate Pain (4 - 6)  oxycodone    5 mG/acetaminophen 325 mG 2 Tablet(s) Oral every 4 hours PRN Severe Pain (7 - 10)    Vital Signs Last 24 Hrs  T(C): 37.1 (09-05-21 @ 13:55), Max: 37.1 (09-05-21 @ 09:15)  T(F): 98.8 (09-05-21 @ 13:55), Max: 98.8 (09-05-21 @ 13:55)  HR: 114 (09-05-21 @ 13:55) (92 - 114)  BP: 101/65 (09-05-21 @ 13:55) (97/60 - 164/65)  BP(mean): 103 (09-05-21 @ 04:54) (93 - 111)  RR: 17 (09-05-21 @ 13:55) (14 - 22)  SpO2: 95% (09-05-21 @ 13:55) (94% - 100%)    PHYSICAL EXAM:  Constitutional: NAD, well-groomed, well-developed  HEENT: PERRLA, EOMI, Normal Hearing, MMM  Neck: No LAD, No JVD  Back: Normal spine flexure, No CVA tenderness  Respiratory: CTAB  Cardiovascular: S1 and S2, RRR, no M/G/R  Gastrointestinal: BS+, soft, NT/ND, abdominal binder in place        CBC Full  -  ( 05 Sep 2021 15:43 )  WBC Count : 7.84 K/uL  RBC Count : 2.55 M/uL  Hemoglobin : 7.5 g/dL  Hematocrit : 23.0 %  Platelet Count - Automated : 263 K/uL  Mean Cell Volume : 90.2 fl  Mean Cell Hemoglobin : 29.4 pg  Mean Cell Hemoglobin Concentration : 32.6 gm/dL  Auto Neutrophil # : x  Auto Lymphocyte # : x  Auto Monocyte # : x  Auto Eosinophil # : x  Auto Basophil # : x  Auto Neutrophil % : x  Auto Lymphocyte % : x  Auto Monocyte % : x  Auto Eosinophil % : x  Auto Basophil % : x      INR: 1.17 (09-05-21 @ 05:06)  INR: 1.17 (09-05-21 @ 00:15)  Activated Partial Thromboplastin Time: 26.1 sec (09-05-21 @ 00:15)  Activated Partial Thromboplastin Time: 86.8 sec (09-03-21 @ 08:12)  Activated Partial Thromboplastin Time: 125.4 sec (09-03-21 @ 01:07)        09-05    139  |  106  |  8   ----------------------------<  91  3.8   |  24  |  0.53    Ca    8.0<L>      05 Sep 2021 05:06  Mg     1.4     09-05    TPro  5.2<L>  /  Alb  2.9<L>  /  TBili  0.2  /  DBili  x   /  AST  31  /  ALT  21  /  AlkPhos  63  09-05    Pathology:

## 2021-09-05 NOTE — CONSULT NOTE ADULT - ASSESSMENT
55 y/o female who developed sub segmental pe postoperatively after abominoplasty, now admitted for abdominal wall hematoma in setting of anticoagulation for pulmonary embolism.     Pulmonary embolism, low risk  - Patient with small sub segmental pulmonary embolism. Low risk pulmonary embolism, with no evidence of RV strain or hemodynamic compromise. PE is provoked in the setting of recent surgery.   - Agree that patient requires anticoagulation for acute PE. Heparin is reasonable until hemostasis is assured. She had recent evacuation of hematoma.   - Patient should ultimately be transitioned to DOAC for 3 months of therapy when bleeding risk is appropriate per surgical team   - Dyspnea is likely explained by symptomatic anemia and atelectasis, unlikely that there is increased PE burden and reimaging at this point would only expose the patient to additional contrast and radiation as the treatment is therapeutic anticoagulation regardless.

## 2021-09-05 NOTE — PRE-OP CHECKLIST - SELECT TESTS ORDERED
BMP/CBC/CMP/PT/PTT/INR/Hepatic Function/Type and Cross/Type and Screen/Urinalysis/EKG/CXR/Results in MD note/COVID-19

## 2021-09-05 NOTE — H&P ADULT - ASSESSMENT
56F s/p abdominoplasty/mastopexy on 8/31/21, complicated by DVT/PE, started on eliquis, now presents with abdominal wall hematoma  - CBC/pre-op labs ordered  - Pain control  - IVF resuscitation  - NPO  - Hematology consult placed for post-op anticoagulation recommendations  - OR for abdominal washout, hematoma evacuation

## 2021-09-05 NOTE — CHART NOTE - NSCHARTNOTEFT_GEN_A_CORE
Called to see patient re: fullness of lower abdomen. Guerrero removed and patient voided- improvement in symptoms. Drains putting out minimal amount serosanguinous drainage. Abdominal exam not concerning for hematoma or collection. Tachycardia to the 110's, recent hct 23.0. Given these findings, will transfuse 2U pRBCs. Discussed with hematology who would like the ptt goal to be 50-60. Orders adjusted. Discussed with pulmonary who believes dyspnea related to anemia and atelectasis. Will transfuse and encourage incentive spirometry. Next CBC/ptt at 1800. Will adject hep gtt accordingly.

## 2021-09-05 NOTE — H&P ADULT - NSHPPHYSICALEXAM_GEN_ALL_CORE
NAD  HEENT: NCAT, MMM  PULM: slight dyspnea, CTAB  CV: mild tachycardia, reg rhthym  BREASTS: incisions cdi, no hematomas/collections  ABD: distended, ttp at inferior aspect of abdomen along incision, hematoma palpable, drains with sanguinous drainage  EXT: wwp

## 2021-09-05 NOTE — CONSULT NOTE ADULT - SUBJECTIVE AND OBJECTIVE BOX
PULMONARY SERVICE INITIAL CONSULT NOTE    HPI:  Very pleasant 56F who is s/p abdominoplasty/mastopexy on 21, complicated by DVT, PE. She was admitted to [facility] on  after a syncopal event where the DVT/PE was diagnosed. She was treated with heparin and transitioned to Eliquis. Discharged on 9/3. She experienced another syncopal event on  and was brought to [facility] for evaluation, found to have an abdominal wall hematoma. Some mild dyspnea/fatigue. She states that her abdominal ALOK drains have been putting out a significant amount, but cannot say exactly how much.  (05 Sep 2021 00:29)      REVIEW OF SYSTEMS:  All additional ROS negative.    PAST MEDICAL & SURGICAL HISTORY:  Hypothyroidism    Endometriosis    Uterine polyp    Anxiety and depression    IBS (irritable bowel syndrome)    H/O gastritis    History of diverticulitis    Kidney stones    History of umbilical hernia    Uterine fibroid    S/P laparoscopic cholecystectomy    S/P laparoscopic surgery  for endometriosis    S/P LASIK (laser assisted in situ keratomileusis) of both eyes    S/P dilatation and curettage  x 1 - missed         FAMILY HISTORY:  FH: pulmonary embolism (Uncle)        SOCIAL HISTORY:  Smoking Status: [ ] Current, [ ] Former, [ ] Never  Pack Years:    MEDICATIONS:  Pulmonary:    Antimicrobials:  ceFAZolin   IVPB 1000 milliGRAM(s) IV Intermittent every 8 hours    Anticoagulants:  heparin  Infusion 750 Unit(s)/Hr IV Continuous <Continuous>    Onc:    GI/:    Endocrine:  levothyroxine 112 MICROGram(s) Oral daily    Cardiac:    Other Medications:  acetaminophen   Tablet .. 325 milliGRAM(s) Oral every 4 hours PRN  buPROPion XL (24-Hour) . 450 milliGRAM(s) Oral daily  diazepam    Tablet 5 milliGRAM(s) Oral every 6 hours PRN  HYDROmorphone  Injectable 0.5 milliGRAM(s) IV Push every 4 hours PRN  lactated ringers. 1000 milliLiter(s) IV Continuous <Continuous>  ondansetron    Tablet 4 milliGRAM(s) Oral every 6 hours PRN  oxycodone    5 mG/acetaminophen 325 mG 1 Tablet(s) Oral every 4 hours PRN  oxycodone    5 mG/acetaminophen 325 mG 2 Tablet(s) Oral every 4 hours PRN      Allergies    No Known Allergies    Intolerances        Vital Signs Last 24 Hrs  T(C): 37.4 (05 Sep 2021 18:51), Max: 37.4 (05 Sep 2021 18:51)  T(F): 99.3 (05 Sep 2021 18:51), Max: 99.3 (05 Sep 2021 18:51)  HR: 114 (05 Sep 2021 18:51) (92 - 117)  BP: 105/67 (05 Sep 2021 18:51) (97/60 - 164/65)  BP(mean): 103 (05 Sep 2021 04:54) (93 - 111)  RR: 16 (05 Sep 2021 18:51) (14 - 22)  SpO2: 96% (05 Sep 2021 18:51) (94% - 100%)     @ 07:  -   @ 07:00  --------------------------------------------------------  IN: 500 mL / OUT: 235 mL / NET: 265 mL     @ 07:  -   @ 20:59  --------------------------------------------------------  IN: 870.5 mL / OUT: 2715 mL / NET: -1844.5 mL          PHYSICAL EXAM:  Constitutional: WDWN  Head: NC/AT  EENT: PERRL, anicteric sclera; oropharynx clear, MMM  Neck: supple, no appreciable JVD  Respiratory: CTA B/L; no W/R/R  Cardiovascular: +S1/S2, RRR  Gastrointestinal: wrapped, c/d/i. distended. mild TTP  Extremities: WWP; no edema, clubbing or cyanosis  Vascular: 2+ radial pulses B/L  Neurological: awake and alert; BONNER    LABS:      CBC Full  -  ( 05 Sep 2021 15:43 )  WBC Count : 7.84 K/uL  RBC Count : 2.55 M/uL  Hemoglobin : 7.5 g/dL  Hematocrit : 23.0 %  Platelet Count - Automated : 263 K/uL  Mean Cell Volume : 90.2 fl  Mean Cell Hemoglobin : 29.4 pg  Mean Cell Hemoglobin Concentration : 32.6 gm/dL  Auto Neutrophil # : x  Auto Lymphocyte # : x  Auto Monocyte # : x  Auto Eosinophil # : x  Auto Basophil # : x  Auto Neutrophil % : x  Auto Lymphocyte % : x  Auto Monocyte % : x  Auto Eosinophil % : x  Auto Basophil % : x        139  |  106  |  8   ----------------------------<  91  3.8   |  24  |  0.53    Ca    8.0<L>      05 Sep 2021 05:06  Mg     1.4         TPro  5.2<L>  /  Alb  2.9<L>  /  TBili  0.2  /  DBili  x   /  AST  31  /  ALT  21  /  AlkPhos  63      PT/INR - ( 05 Sep 2021 05:06 )   PT: 13.9 sec;   INR: 1.17          PTT - ( 05 Sep 2021 00:15 )  PTT:26.1 sec      Urinalysis Basic - ( 05 Sep 2021 00:41 )    Color: Yellow / Appearance: Clear / S.015 / pH: x  Gluc: x / Ketone: NEGATIVE  / Bili: Negative / Urobili: 0.2 E.U./dL   Blood: x / Protein: NEGATIVE mg/dL / Nitrite: NEGATIVE   Leuk Esterase: NEGATIVE / RBC: > 10 /HPF / WBC < 5 /HPF   Sq Epi: x / Non Sq Epi: 0-5 /HPF / Bacteria: None /HPF                RADIOLOGY & ADDITIONAL STUDIES:    CT Angio Chest PE Protocol w/ IV Cont  IMPRESSION:  Acute right upper lobe segmental pulmonary embolism.    Postsurgical changes of the abdomen and pelvis without acute intra-abdominal or pelvic pathology.    These findings were discussed with Dr. Zhang at 2021 3:59 PM by Dr. Cheng with read back confirmation.

## 2021-09-06 LAB
ANION GAP SERPL CALC-SCNC: 6 MMOL/L — SIGNIFICANT CHANGE UP (ref 5–17)
APTT BLD: 46.9 SEC — HIGH (ref 27.5–35.5)
APTT BLD: 46.9 SEC — HIGH (ref 27.5–35.5)
APTT BLD: 57.5 SEC — HIGH (ref 27.5–35.5)
APTT BLD: 59.6 SEC — HIGH (ref 27.5–35.5)
BUN SERPL-MCNC: 16 MG/DL — SIGNIFICANT CHANGE UP (ref 7–23)
CALCIUM SERPL-MCNC: 7.9 MG/DL — LOW (ref 8.4–10.5)
CHLORIDE SERPL-SCNC: 108 MMOL/L — SIGNIFICANT CHANGE UP (ref 96–108)
CO2 SERPL-SCNC: 29 MMOL/L — SIGNIFICANT CHANGE UP (ref 22–31)
COVID-19 SPIKE DOMAIN AB INTERP: POSITIVE
COVID-19 SPIKE DOMAIN ANTIBODY RESULT: >250 U/ML — HIGH
CREAT SERPL-MCNC: 0.65 MG/DL — SIGNIFICANT CHANGE UP (ref 0.5–1.3)
GLUCOSE SERPL-MCNC: 112 MG/DL — HIGH (ref 70–99)
HCT VFR BLD CALC: 23.7 % — LOW (ref 34.5–45)
HCT VFR BLD CALC: 27.3 % — LOW (ref 34.5–45)
HGB BLD-MCNC: 7.6 G/DL — LOW (ref 11.5–15.5)
HGB BLD-MCNC: 9 G/DL — LOW (ref 11.5–15.5)
MCHC RBC-ENTMCNC: 29 PG — SIGNIFICANT CHANGE UP (ref 27–34)
MCHC RBC-ENTMCNC: 29.7 PG — SIGNIFICANT CHANGE UP (ref 27–34)
MCHC RBC-ENTMCNC: 32.1 GM/DL — SIGNIFICANT CHANGE UP (ref 32–36)
MCHC RBC-ENTMCNC: 33 GM/DL — SIGNIFICANT CHANGE UP (ref 32–36)
MCV RBC AUTO: 90.1 FL — SIGNIFICANT CHANGE UP (ref 80–100)
MCV RBC AUTO: 90.5 FL — SIGNIFICANT CHANGE UP (ref 80–100)
NRBC # BLD: 0 /100 WBCS — SIGNIFICANT CHANGE UP (ref 0–0)
NRBC # BLD: 0 /100 WBCS — SIGNIFICANT CHANGE UP (ref 0–0)
PLATELET # BLD AUTO: 234 K/UL — SIGNIFICANT CHANGE UP (ref 150–400)
PLATELET # BLD AUTO: 259 K/UL — SIGNIFICANT CHANGE UP (ref 150–400)
POTASSIUM SERPL-MCNC: 3.5 MMOL/L — SIGNIFICANT CHANGE UP (ref 3.5–5.3)
POTASSIUM SERPL-SCNC: 3.5 MMOL/L — SIGNIFICANT CHANGE UP (ref 3.5–5.3)
RBC # BLD: 2.62 M/UL — LOW (ref 3.8–5.2)
RBC # BLD: 3.03 M/UL — LOW (ref 3.8–5.2)
RBC # FLD: 14.2 % — SIGNIFICANT CHANGE UP (ref 10.3–14.5)
RBC # FLD: 14.4 % — SIGNIFICANT CHANGE UP (ref 10.3–14.5)
SARS-COV-2 IGG+IGM SERPL QL IA: >250 U/ML — HIGH
SARS-COV-2 IGG+IGM SERPL QL IA: POSITIVE
SODIUM SERPL-SCNC: 143 MMOL/L — SIGNIFICANT CHANGE UP (ref 135–145)
WBC # BLD: 10.71 K/UL — HIGH (ref 3.8–10.5)
WBC # BLD: 9.22 K/UL — SIGNIFICANT CHANGE UP (ref 3.8–10.5)
WBC # FLD AUTO: 10.71 K/UL — HIGH (ref 3.8–10.5)
WBC # FLD AUTO: 9.22 K/UL — SIGNIFICANT CHANGE UP (ref 3.8–10.5)

## 2021-09-06 PROCEDURE — 99233 SBSQ HOSP IP/OBS HIGH 50: CPT | Mod: GC

## 2021-09-06 RX ORDER — HEPARIN SODIUM 5000 [USP'U]/ML
1100 INJECTION INTRAVENOUS; SUBCUTANEOUS
Qty: 25000 | Refills: 0 | Status: DISCONTINUED | OUTPATIENT
Start: 2021-09-06 | End: 2021-09-07

## 2021-09-06 RX ORDER — HEPARIN SODIUM 5000 [USP'U]/ML
1000 INJECTION INTRAVENOUS; SUBCUTANEOUS
Qty: 25000 | Refills: 0 | Status: DISCONTINUED | OUTPATIENT
Start: 2021-09-06 | End: 2021-09-06

## 2021-09-06 RX ADMIN — OXYCODONE AND ACETAMINOPHEN 1 TABLET(S): 5; 325 TABLET ORAL at 13:05

## 2021-09-06 RX ADMIN — Medication 100 MILLIGRAM(S): at 21:56

## 2021-09-06 RX ADMIN — Medication 5 MILLIGRAM(S): at 21:56

## 2021-09-06 RX ADMIN — HEPARIN SODIUM 11 UNIT(S)/HR: 5000 INJECTION INTRAVENOUS; SUBCUTANEOUS at 23:31

## 2021-09-06 RX ADMIN — HEPARIN SODIUM 10 UNIT(S)/HR: 5000 INJECTION INTRAVENOUS; SUBCUTANEOUS at 16:41

## 2021-09-06 RX ADMIN — OXYCODONE AND ACETAMINOPHEN 1 TABLET(S): 5; 325 TABLET ORAL at 12:05

## 2021-09-06 RX ADMIN — Medication 100 MILLIGRAM(S): at 15:06

## 2021-09-06 RX ADMIN — OXYCODONE AND ACETAMINOPHEN 1 TABLET(S): 5; 325 TABLET ORAL at 19:39

## 2021-09-06 RX ADMIN — BUPROPION HYDROCHLORIDE 450 MILLIGRAM(S): 150 TABLET, EXTENDED RELEASE ORAL at 12:08

## 2021-09-06 RX ADMIN — OXYCODONE AND ACETAMINOPHEN 1 TABLET(S): 5; 325 TABLET ORAL at 20:39

## 2021-09-06 RX ADMIN — OXYCODONE AND ACETAMINOPHEN 2 TABLET(S): 5; 325 TABLET ORAL at 23:09

## 2021-09-06 RX ADMIN — Medication 112 MICROGRAM(S): at 06:05

## 2021-09-06 RX ADMIN — Medication 100 MILLIGRAM(S): at 06:06

## 2021-09-06 NOTE — PROGRESS NOTE ADULT - ASSESSMENT
56F s/p abdominoplasty, mastopexy on 8/31 with post-op DVT/PE, anticoagulated and found to have abdominal wall hematoma on 9/4/21 s/p evacuation of hematoma and control of bleeding on 9/5/21  - Discussed case with hematologist. goal PTT 50-65. Must remain monitored for 48hrs once therapeutic prior to transitioning to oral agent  - Ambulate with assistance (while bent at the waist), SCDs  - Drain care. Please strip/measure/record Q4h  - Beach chair position while in bed, abdominal binder at all times  - Ancef  - 1U pRBC this am, CBC after

## 2021-09-06 NOTE — PROGRESS NOTE ADULT - ASSESSMENT
55 y/o female who developed sub segmental pe postoperatively after abominoplasty, now admitted for abdominal wall hematoma in setting of anticoagulation for pulmonary embolism.     Pulmonary embolism, low risk  - Patient with small sub segmental pulmonary embolism. Low risk pulmonary embolism, with no evidence of RV strain or hemodynamic compromise. PE is provoked in the setting of recent surgery.   - can continue with heparin, transition to eliquis at the discretion of the primary team   - will need DOAC for 3 months at least for provoked PE  - Please provide information for Dr. Lico Rico in discharge documentation for patient to f/u for pulmonary embolism.   - Pulmonary service will sign off, please reconsult with questions

## 2021-09-07 DIAGNOSIS — R55 SYNCOPE AND COLLAPSE: ICD-10-CM

## 2021-09-07 DIAGNOSIS — E03.9 HYPOTHYROIDISM, UNSPECIFIED: ICD-10-CM

## 2021-09-07 DIAGNOSIS — F41.9 ANXIETY DISORDER, UNSPECIFIED: ICD-10-CM

## 2021-09-07 LAB
APTT BLD: 58.4 SEC — HIGH (ref 27.5–35.5)
APTT BLD: 66.9 SEC — HIGH (ref 27.5–35.5)
HCT VFR BLD CALC: 25.5 % — LOW (ref 34.5–45)
HCT VFR BLD CALC: 28.1 % — LOW (ref 34.5–45)
HGB BLD-MCNC: 8.3 G/DL — LOW (ref 11.5–15.5)
HGB BLD-MCNC: 9.2 G/DL — LOW (ref 11.5–15.5)
MCHC RBC-ENTMCNC: 29.4 PG — SIGNIFICANT CHANGE UP (ref 27–34)
MCHC RBC-ENTMCNC: 29.6 PG — SIGNIFICANT CHANGE UP (ref 27–34)
MCHC RBC-ENTMCNC: 32.5 GM/DL — SIGNIFICANT CHANGE UP (ref 32–36)
MCHC RBC-ENTMCNC: 32.7 GM/DL — SIGNIFICANT CHANGE UP (ref 32–36)
MCV RBC AUTO: 90.4 FL — SIGNIFICANT CHANGE UP (ref 80–100)
MCV RBC AUTO: 90.4 FL — SIGNIFICANT CHANGE UP (ref 80–100)
NRBC # BLD: 0 /100 WBCS — SIGNIFICANT CHANGE UP (ref 0–0)
NRBC # BLD: 0 /100 WBCS — SIGNIFICANT CHANGE UP (ref 0–0)
PLATELET # BLD AUTO: 254 K/UL — SIGNIFICANT CHANGE UP (ref 150–400)
PLATELET # BLD AUTO: 279 K/UL — SIGNIFICANT CHANGE UP (ref 150–400)
RBC # BLD: 2.82 M/UL — LOW (ref 3.8–5.2)
RBC # BLD: 3.11 M/UL — LOW (ref 3.8–5.2)
RBC # FLD: 14.6 % — HIGH (ref 10.3–14.5)
RBC # FLD: 14.9 % — HIGH (ref 10.3–14.5)
WBC # BLD: 8.12 K/UL — SIGNIFICANT CHANGE UP (ref 3.8–10.5)
WBC # BLD: 8.62 K/UL — SIGNIFICANT CHANGE UP (ref 3.8–10.5)
WBC # FLD AUTO: 8.12 K/UL — SIGNIFICANT CHANGE UP (ref 3.8–10.5)
WBC # FLD AUTO: 8.62 K/UL — SIGNIFICANT CHANGE UP (ref 3.8–10.5)

## 2021-09-07 PROCEDURE — 99253 IP/OBS CNSLTJ NEW/EST LOW 45: CPT | Mod: GC

## 2021-09-07 RX ORDER — OXYCODONE HYDROCHLORIDE 5 MG/1
1 TABLET ORAL
Qty: 1 | Refills: 0
Start: 2021-09-07

## 2021-09-07 RX ORDER — OXYCODONE HYDROCHLORIDE 5 MG/1
5 TABLET ORAL EVERY 4 HOURS
Refills: 0 | Status: DISCONTINUED | OUTPATIENT
Start: 2021-09-07 | End: 2021-09-08

## 2021-09-07 RX ORDER — HEPARIN SODIUM 5000 [USP'U]/ML
1000 INJECTION INTRAVENOUS; SUBCUTANEOUS
Qty: 25000 | Refills: 0 | Status: DISCONTINUED | OUTPATIENT
Start: 2021-09-07 | End: 2021-09-07

## 2021-09-07 RX ORDER — ONDANSETRON 8 MG/1
1 TABLET, FILM COATED ORAL
Qty: 1 | Refills: 0
Start: 2021-09-07

## 2021-09-07 RX ORDER — HEPARIN SODIUM 5000 [USP'U]/ML
1000 INJECTION INTRAVENOUS; SUBCUTANEOUS
Qty: 25000 | Refills: 0 | Status: DISCONTINUED | OUTPATIENT
Start: 2021-09-07 | End: 2021-09-08

## 2021-09-07 RX ORDER — INFLUENZA VIRUS VACCINE 15; 15; 15; 15 UG/.5ML; UG/.5ML; UG/.5ML; UG/.5ML
0.5 SUSPENSION INTRAMUSCULAR ONCE
Refills: 0 | Status: COMPLETED | OUTPATIENT
Start: 2021-09-07 | End: 2021-09-07

## 2021-09-07 RX ORDER — OXYCODONE HYDROCHLORIDE 5 MG/1
10 TABLET ORAL EVERY 4 HOURS
Refills: 0 | Status: DISCONTINUED | OUTPATIENT
Start: 2021-09-07 | End: 2021-09-08

## 2021-09-07 RX ADMIN — Medication 100 MILLIGRAM(S): at 13:19

## 2021-09-07 RX ADMIN — Medication 325 MILLIGRAM(S): at 11:16

## 2021-09-07 RX ADMIN — Medication 100 MILLIGRAM(S): at 21:52

## 2021-09-07 RX ADMIN — BUPROPION HYDROCHLORIDE 450 MILLIGRAM(S): 150 TABLET, EXTENDED RELEASE ORAL at 11:16

## 2021-09-07 RX ADMIN — Medication 100 MILLIGRAM(S): at 06:00

## 2021-09-07 RX ADMIN — Medication 112 MICROGRAM(S): at 06:00

## 2021-09-07 RX ADMIN — Medication 5 MILLIGRAM(S): at 11:15

## 2021-09-07 RX ADMIN — OXYCODONE AND ACETAMINOPHEN 2 TABLET(S): 5; 325 TABLET ORAL at 00:09

## 2021-09-07 RX ADMIN — Medication 325 MILLIGRAM(S): at 16:04

## 2021-09-07 RX ADMIN — OXYCODONE HYDROCHLORIDE 10 MILLIGRAM(S): 5 TABLET ORAL at 23:19

## 2021-09-07 RX ADMIN — Medication 325 MILLIGRAM(S): at 12:00

## 2021-09-07 RX ADMIN — OXYCODONE HYDROCHLORIDE 10 MILLIGRAM(S): 5 TABLET ORAL at 21:52

## 2021-09-07 RX ADMIN — Medication 5 MILLIGRAM(S): at 18:54

## 2021-09-07 NOTE — CONSULT NOTE ADULT - SUBJECTIVE AND OBJECTIVE BOX
OVERNIGHT EVENTS:    SUBJECTIVE / INTERVAL HPI: Patient seen and examined at bedside.     VITAL SIGNS:  Vital Signs Last 24 Hrs  T(C): 36.8 (07 Sep 2021 05:50), Max: 36.9 (06 Sep 2021 18:40)  T(F): 98.3 (07 Sep 2021 05:50), Max: 98.5 (06 Sep 2021 21:20)  HR: 90 (07 Sep 2021 08:36) (90 - 108)  BP: 115/75 (07 Sep 2021 08:36) (99/64 - 118/63)  BP(mean): --  RR: 16 (07 Sep 2021 08:36) (16 - 22)  SpO2: 99% (07 Sep 2021 08:36) (95% - 99%)    09-06-21 @ 07:01  -  09-07-21 @ 07:00  --------------------------------------------------------  IN: 947 mL / OUT: 1337 mL / NET: -390 mL    09-07-21 @ 07:01  -  09-07-21 @ 15:14  --------------------------------------------------------  IN: 60 mL / OUT: 755 mL / NET: -695 mL        PHYSICAL EXAM:    General: WDWN  HEENT: NC/AT; PERRL, anicteric sclera; MMM  Neck: supple  Cardiovascular: +S1/S2; RRR  Respiratory: CTA B/L; no W/R/R  Gastrointestinal: soft, NT/ND; +BSx4  Extremities: WWP; no edema, clubbing or cyanosis  Vascular: 2+ radial, DP/PT pulses B/L  Neurological: AAOx3; no focal deficits    MEDICATIONS:  MEDICATIONS  (STANDING):  buPROPion XL (24-Hour) . 450 milliGRAM(s) Oral daily  ceFAZolin   IVPB 1000 milliGRAM(s) IV Intermittent every 8 hours  heparin  Infusion 1000 Unit(s)/Hr (10 mL/Hr) IV Continuous <Continuous>  influenza   Vaccine 0.5 milliLiter(s) IntraMuscular once  levothyroxine 112 MICROGram(s) Oral daily    MEDICATIONS  (PRN):  acetaminophen   Tablet .. 325 milliGRAM(s) Oral every 4 hours PRN Mild Pain (1 - 3)  diazepam    Tablet 5 milliGRAM(s) Oral every 6 hours PRN spasm  HYDROmorphone  Injectable 0.5 milliGRAM(s) IV Push every 4 hours PRN breakthrough pain  ondansetron    Tablet 4 milliGRAM(s) Oral every 6 hours PRN Nausea  oxyCODONE    IR 10 milliGRAM(s) Oral every 4 hours PRN Severe Pain (7 - 10)  oxyCODONE    IR 5 milliGRAM(s) Oral every 4 hours PRN Moderate Pain (4 - 6)      ALLERGIES:  Allergies    No Known Allergies    Intolerances        LABS:                        9.2    8.62  )-----------( 279      ( 07 Sep 2021 12:33 )             28.1     09-06    143  |  108  |  16  ----------------------------<  112<H>  3.5   |  29  |  0.65    Ca    7.9<L>      06 Sep 2021 05:51      PTT - ( 07 Sep 2021 13:35 )  PTT:58.4 sec    CAPILLARY BLOOD GLUCOSE            RADIOLOGY & ADDITIONAL TESTS: Reviewed.    ASSESSMENT:    PLAN:    HPI:   56F who is s/p abdominoplasty/mastopexy (08/31/21), which was complicated by DVT, PE. She was admitted to [facility] on 09/02 after a syncopal event where the DVT/PE was diagnosed. She was treated with heparin and transitioned to Eliquis. Was discharged on Eliquis on 09/03. She experienced another syncopal event at home, however, on 09/04 and was brought to [facility] for evaluation, found to have an abdominal wall hematoma, and transferred from [facility] to [facility] per her plastic surgeon's request. Complains of some mild dyspnea/fatigue, which she says is now much improved. Has a history of experiencing vasovagal episodes and was previously receiving hormone replacement therapy, which was discontinued a week prior to her surgery. Patient states that her abdomen started feeling 'very tight like a balloon,' which can be explained by the abdominal hematoma but that now it feels normal to her.     OVERNIGHT EVENTS:  No acute events.     SUBJECTIVE / INTERVAL HPI:   Patient seen and examined at bedside. States that she is feeling much better but that this entire experience has made her feel somewhat anxious. Says that the initial symptoms that brought her into the hospital have now all resolved. Currently denies n/v/d/c, fever, chills, SOB, or CP. Also denies any evidence of calf swelling, erythema, or pain b/l.       VITAL SIGNS:  Vital Signs Last 24 Hrs  T(C): 36.8 (07 Sep 2021 05:50), Max: 36.9 (06 Sep 2021 18:40)  T(F): 98.3 (07 Sep 2021 05:50), Max: 98.5 (06 Sep 2021 21:20)  HR: 90 (07 Sep 2021 08:36) (90 - 108)  BP: 115/75 (07 Sep 2021 08:36) (99/64 - 118/63)  BP(mean): --  RR: 16 (07 Sep 2021 08:36) (16 - 22)  SpO2: 99% (07 Sep 2021 08:36) (95% - 99%)    09-06-21 @ 07:01  -  09-07-21 @ 07:00  --------------------------------------------------------  IN: 947 mL / OUT: 1337 mL / NET: -390 mL    09-07-21 @ 07:01  -  09-07-21 @ 15:14  --------------------------------------------------------  IN: 60 mL / OUT: 755 mL / NET: -695 mL        PHYSICAL EXAM:  General: WDWN, in NAD, resting comfortably in bed   HEENT: NC/AT; PERRL, anicteric sclera; MMM  Neck: supple  Cardiovascular: +S1/S2; tachycardic, but RR, no m/g/r  Respiratory: CTA B/L; no W/R/R  Gastrointestinal: soft, NT/ND; +BSx4, large incision in lower abdomen healing, no signs of infection at suture site, +abdominal binder in place   Extremities: WWP; no edema, clubbing or cyanosis  Vascular: 2+ radial, DP/PT pulses B/L  Neurological: AAOx3; no focal deficits, moves all extremities spontaneously, strength WNL b/l         MEDICATIONS:  MEDICATIONS  (STANDING):  buPROPion XL (24-Hour) . 450 milliGRAM(s) Oral daily  ceFAZolin   IVPB 1000 milliGRAM(s) IV Intermittent every 8 hours  heparin  Infusion 1000 Unit(s)/Hr (10 mL/Hr) IV Continuous <Continuous>  influenza   Vaccine 0.5 milliLiter(s) IntraMuscular once  levothyroxine 112 MICROGram(s) Oral daily    MEDICATIONS  (PRN):  acetaminophen   Tablet .. 325 milliGRAM(s) Oral every 4 hours PRN Mild Pain (1 - 3)  diazepam    Tablet 5 milliGRAM(s) Oral every 6 hours PRN spasm  HYDROmorphone  Injectable 0.5 milliGRAM(s) IV Push every 4 hours PRN breakthrough pain  ondansetron    Tablet 4 milliGRAM(s) Oral every 6 hours PRN Nausea  oxyCODONE    IR 10 milliGRAM(s) Oral every 4 hours PRN Severe Pain (7 - 10)  oxyCODONE    IR 5 milliGRAM(s) Oral every 4 hours PRN Moderate Pain (4 - 6)      ALLERGIES:  Allergies    No Known Allergies    Intolerances        LABS:                        9.2    8.62  )-----------( 279      ( 07 Sep 2021 12:33 )             28.1     09-06    143  |  108  |  16  ----------------------------<  112<H>  3.5   |  29  |  0.65    Ca    7.9<L>      06 Sep 2021 05:51      PTT - ( 07 Sep 2021 13:35 )  PTT:58.4 sec    CAPILLARY BLOOD GLUCOSE    RADIOLOGY & ADDITIONAL TESTS: Reviewed.

## 2021-09-07 NOTE — CONSULT NOTE ADULT - PROBLEM SELECTOR RECOMMENDATION 4
Has not had LE dopplers performed here, but per chart, patient was found to have a DVT at [facility]. No evidence of LE edema, erythema, or tenderness to palpation.   - management as stated above with AC

## 2021-09-07 NOTE — CONSULT NOTE ADULT - PROBLEM SELECTOR RECOMMENDATION 3
Patient presenting with 2 episodes of syncope. Describes both involved a prodromal state, in which she felt like she was blacking out and could not respond to those around her even though she saw them. Suspect likely multifactorial in nature and due to symptomatic anemia, dyspnea on exertion in the setting of PE, and vasovagal syncope (patient has a history of numerous episodes before). States these episodes were accompanied by a feeling of SOB.   - monitor Hb closely while on heparin gtt   - transfuse if Hb<7 and maintain active type and screen   - management of PE as stated above   - counseled patient on behavioral modifications for when experiencing vasovagal episodes

## 2021-09-07 NOTE — PROGRESS NOTE ADULT - ASSESSMENT
56F s/p abdominoplasty, mastopexy on 8/31 with post-op DVT/PE, anticoagulated and found to have abdominal wall hematoma on 9/4/21 s/p evacuation of hematoma and control of bleeding on 9/5/21  - Goal PTT 45-65. Must remain monitored for 48hrs once therapeutic prior to transitioning to oral agent  - Ambulate with assistance (while bent at the waist), SCDs  - Drain care. Please strip/measure/record Q4h  - Beach chair position while in bed, abdominal binder at all times  - Pain/nausea control  - Ancef  - s/p 2U pRBC 56F s/p abdominoplasty, mastopexy on 8/31 with post-op DVT/PE, anticoagulated and found to have abdominal wall hematoma on 9/4/21 s/p evacuation of hematoma and control of bleeding on 9/5/21  - Goal PTT 45-65. Must remain monitored for 48hrs once therapeutic prior to transitioning to oral agent  - Ambulate with assistance (while bent at the waist), SCDs  - Drain care. Please strip/measure/record Q4h  - Beach chair position while in bed, abdominal binder at all times  - Pain/nausea control  - Ancef  - s/p 2U pRBC - hgb 8.3<9.0. Transfuse P RBC to Hb>7

## 2021-09-07 NOTE — CONSULT NOTE ADULT - ATTENDING COMMENTS
Date of service 9/7/21    Pt seen and examined alongside resident and discussed w  and NSx team at bedside.    56F w hypothyroidism s/p recent abdominoplasty/mastopexy on 8/31/21 complicated by DVT and RUL PE found post syncopal event at [facility] on 9/2 followed by pre-syncopal event - admitted to [facility] then transferred to [facility] now s/p abd wall hematoma evacuation 9/5 - now on heparin gtt w heme-onc and pulmonology following -- plan to transition to therapeutic lovenox. Pt recently received 2u RBCs 2 day ago  w stable hgb since then (7.6 to 9 to 8. 3 to 9.2). She overall feels well and has been ambulating wo issues. Denies personal or FHx of blood clots. Does report being on hormone replacement - estrogen and testosterone which she held 1 week prior to surgery. Exam shows female in NAD on RA, RRR, nml respiratory effort, CTAB wo wheezing, rhonchi, or rales, abdominal binder in place, 2 ALOK drains w serosanguinous output from R and L lower abdomen surgical incisions. Lower abdominal surgical incision appears to be healing. Alert and moving all extremities.     Recommendations  -In setting of recent provoked DVT and PE post-operatively - recommend patient to hold hormone replacement until she discusses with her prescribing physician.

## 2021-09-07 NOTE — CONSULT NOTE ADULT - ASSESSMENT
56F who is s/p abdominoplasty/mastopexy (08/31/21), which was complicated by two admissions post-surgery for a DVT/PE. Patient is currently on a heparin gtt to treat for a provoked PE.

## 2021-09-07 NOTE — CONSULT NOTE ADULT - PROBLEM SELECTOR RECOMMENDATION 9
Had 2 episodes of syncope s/p abdominoplasty/mastopexy on 08/31. CTPE revealed evidence of a PE but no signs of right heart strain. In need of AC but of note, patient was found to be bleeding into abdomen due to an abdominal hematoma and went for an evacuative procedure with plastic surgery. Bleeding has since resolved but still at risk given initial hematoma occurred while on Eliquis.   - heme onc consulted, recommendations appreciated   - pulm consulted, recommendations appreciated   - plan to remain on heparin gtt for 72 hours total; once therapeutic for 72 hours, patient will be transitioned to therapeutic Lovenox, which she will be discharged on per heme onc   - trend the PTT while on heparin gtt (goal PTT: 45-65, lower goal given concern for bleeding)  - monitor CBC and trend Hb given concern for re-bleed

## 2021-09-07 NOTE — CONSULT NOTE ADULT - REASON FOR ADMISSION
Abdominal pain, syncope s/p abdominoplasty, mastopexy on 8/31/21

## 2021-09-07 NOTE — CONSULT NOTE ADULT - PROBLEM SELECTOR RECOMMENDATION 2
History of Hashimoto's. On Synthroid at home.   - continue home Synthroid 112 mcg PO daily   - TSH in the past week was WNL

## 2021-09-07 NOTE — PROGRESS NOTE ADULT - ASSESSMENT
56F who is s/p abdominoplasty/mastopexy on 8/31/21, complicated by DVT, PE. She was admitted to [facility] on 9/2 after a syncopal event where the DVT/PE was diagnosed. She was treated with heparin and transitioned to Eliquis. Discharged on 9/3. She experienced another syncopal event on 9/4 and was brought to [facility] for evaluation, found to have an abdominal wall hematoma. Now s/p hematoma evacuation on 9/5.  Hematology consulted for AC recommendations.    Acute PE  Provoked in setting of surgery.  Recommend continuing anticoagulation with close monitoring of CBC and abdominal exams  Risk of worsening PE outweighs risk of bleeding  Continue heparin gtt, Goal PTT 45-65  Needs to be therapeutic on Heparin for 48-72 hrs.  Recommend transitioning to Lovenox 1mg/kg SQ BID (70mg BID) on discharge. It is the safest option given shorter half life and concern for bleeding risk.   Transfuse PRBC to Hb>7. Hb is stable.     D/w Dr. Hirsch (Hematology Svc Attending).   56F who is s/p abdominoplasty/mastopexy on 8/31/21, complicated by DVT, PE. She was admitted to [facility] on 9/2 after a syncopal event where the DVT/PE was diagnosed. She was treated with heparin and transitioned to Eliquis. Discharged on 9/3. She experienced another syncopal event on 9/4 and was brought to [facility] for evaluation, found to have an abdominal wall hematoma. Now s/p hematoma evacuation on 9/5.  Hematology consulted for AC recommendations.    Acute PE  Provoked in setting of surgery.  Recommend continuing anticoagulation with close monitoring of CBC and abdominal exams. Recommend AC for atleast 3 months.   Risk of worsening PE outweighs risk of bleeding  Continue heparin gtt, Goal PTT 45-65  Needs to be therapeutic on Heparin for 48-72 hrs.  Recommend transitioning to Lovenox 1mg/kg SQ BID (70mg BID) on discharge. It is the safest option given shorter half life and concern for bleeding risk.   Transfuse PRBC to Hb>7. Hb is stable.     On discharge, can follow up with Dr. Anel Kim within 1-2 weeks at [facility] on [address] [place] [address]. Number to make an appointment is [phone].     D/w Dr. Hirsch (Hematology Svc Attending).

## 2021-09-07 NOTE — CONSULT NOTE ADULT - PROBLEM SELECTOR RECOMMENDATION 5
History of anxiety and menopausal symptoms. Takes Buproprion at home.  - continue with home buproprion 450 mg PO daily

## 2021-09-08 ENCOUNTER — TRANSCRIPTION ENCOUNTER (OUTPATIENT)
Age: 56
End: 2021-09-08

## 2021-09-08 VITALS — RESPIRATION RATE: 19 BRPM | SYSTOLIC BLOOD PRESSURE: 119 MMHG | DIASTOLIC BLOOD PRESSURE: 78 MMHG | HEART RATE: 95 BPM

## 2021-09-08 LAB
APTT BLD: 64.6 SEC — HIGH (ref 27.5–35.5)
HCT VFR BLD CALC: 27.7 % — LOW (ref 34.5–45)
HGB BLD-MCNC: 9 G/DL — LOW (ref 11.5–15.5)
MCHC RBC-ENTMCNC: 29.8 PG — SIGNIFICANT CHANGE UP (ref 27–34)
MCHC RBC-ENTMCNC: 32.5 GM/DL — SIGNIFICANT CHANGE UP (ref 32–36)
MCV RBC AUTO: 91.7 FL — SIGNIFICANT CHANGE UP (ref 80–100)
NRBC # BLD: 0 /100 WBCS — SIGNIFICANT CHANGE UP (ref 0–0)
PLATELET # BLD AUTO: 302 K/UL — SIGNIFICANT CHANGE UP (ref 150–400)
RBC # BLD: 3.02 M/UL — LOW (ref 3.8–5.2)
RBC # FLD: 14.9 % — HIGH (ref 10.3–14.5)
SURGICAL PATHOLOGY STUDY: SIGNIFICANT CHANGE UP
WBC # BLD: 8.17 K/UL — SIGNIFICANT CHANGE UP (ref 3.8–10.5)
WBC # FLD AUTO: 8.17 K/UL — SIGNIFICANT CHANGE UP (ref 3.8–10.5)

## 2021-09-08 RX ORDER — CIPROFLOXACIN LACTATE 400MG/40ML
1 VIAL (ML) INTRAVENOUS
Qty: 0 | Refills: 0 | DISCHARGE

## 2021-09-08 RX ORDER — LIRAGLUTIDE 6 MG/ML
3 INJECTION SUBCUTANEOUS
Qty: 0 | Refills: 0 | DISCHARGE

## 2021-09-08 RX ORDER — ENOXAPARIN SODIUM 100 MG/ML
70 INJECTION SUBCUTANEOUS
Qty: 42 | Refills: 0
Start: 2021-09-08 | End: 2021-10-07

## 2021-09-08 RX ORDER — ENOXAPARIN SODIUM 100 MG/ML
70 INJECTION SUBCUTANEOUS
Qty: 48 | Refills: 0
Start: 2021-09-08 | End: 2021-10-07

## 2021-09-08 RX ORDER — ACETAMINOPHEN 500 MG
1 TABLET ORAL
Qty: 0 | Refills: 0 | DISCHARGE
Start: 2021-09-08

## 2021-09-08 RX ADMIN — Medication 112 MICROGRAM(S): at 06:06

## 2021-09-08 RX ADMIN — Medication 325 MILLIGRAM(S): at 09:54

## 2021-09-08 RX ADMIN — BUPROPION HYDROCHLORIDE 450 MILLIGRAM(S): 150 TABLET, EXTENDED RELEASE ORAL at 06:21

## 2021-09-08 RX ADMIN — Medication 325 MILLIGRAM(S): at 10:45

## 2021-09-08 RX ADMIN — Medication 100 MILLIGRAM(S): at 06:07

## 2021-09-08 NOTE — DISCHARGE NOTE PROVIDER - NSDCMRMEDTOKEN_GEN_ALL_CORE_FT
acetaminophen 325 mg oral tablet: 1 tab(s) orally every 4 hours, As needed, Mild Pain (1 - 3)  buPROPion 150 mg/24 hours (XL) oral tablet, extended release: 3 tabs (450 mg) orally every 24 hours  diazePAM 5 mg oral tablet: 1 tab(s) orally every 6 hours, As Needed  enoxaparin 60 mg/0.6 mL injectable solution: 70 milligram(s) subcutaneously every 12 hours   ergocalciferol 1.25 mg (50,000 intl units) oral capsule: 1 cap(s) orally once a week   esomeprazole 40 mg oral delayed release capsule: 1 cap(s) orally once a day  levothyroxine 112 mcg (0.112 mg) oral tablet: 1 tab(s) orally once a day  ondansetron 4 mg oral tablet: 1 tab(s) orally every 4 hours, As Needed  oxycodone-acetaminophen 5 mg-325 mg oral tablet: 1 - 2 tab(s) orally every 4 - 6 hours, As Needed for pain  zolpidem 5 mg oral tablet: 1 tab(s) orally once a day (at bedtime), As Needed

## 2021-09-08 NOTE — DISCHARGE NOTE PROVIDER - HOSPITAL COURSE
55 yo female underwent abdominoplasty/mastopexy on 8/31/21, complicated by DVT, PE. She was admitted to [facility] on 9/2 after a syncopal event where the DVT/PE was diagnosed. She was treated with heparin and transitioned to Eliquis. She was discharged on 9/3 and experienced another syncopal event on 9/4 so was brought to [facility] for evaluation, found to have an abdominal wall hematoma and was transferred to [facility] for evacuation of hematoma and washout on 9/5. Patient tolerated the procedure well. Patient was monitored and followed closely by medicine, hematology, and pulmonology while admitted. We planned for discharge after she was therapeutic on heparin for 48 hours and sent her home with 1 month of Lovenox. She will be on anticoagulation for a total of 3 months and will follow up with hematology, pulmonology, her PMD, OBGYN in 1-2 weeks. Follow up in one week with Dr. Trevizo in the office.

## 2021-09-08 NOTE — PROGRESS NOTE ADULT - SUBJECTIVE AND OBJECTIVE BOX
HEMATOLOGY PROGRESS NOTE    INTERVAL HPI: Patient seen and examined. Denies any chest pain, sob, palpitations, leg pain or swelling. Reports drainage in the ALOK drain has decreased.     56F who is s/p abdominoplasty/mastopexy on 8/31/21, complicated by DVT, PE. She was admitted to [facility] on 9/2 after a syncopal event where the DVT/PE was diagnosed. She was treated with heparin and transitioned to Eliquis. Discharged on 9/3. She experienced another syncopal event on 9/4 and was brought to [facility] for evaluation, found to have an abdominal wall hematoma. Some mild dyspnea/fatigue. She states that her abdominal ALOK drains have been putting out a significant amount, but cannot say exactly how much.  (05 Sep 2021 00:29)      Allergies  No Known Allergies  Intolerances    MEDICATIONS  (STANDING):  buPROPion XL (24-Hour) . 450 milliGRAM(s) Oral daily  ceFAZolin   IVPB 1000 milliGRAM(s) IV Intermittent every 8 hours  heparin  Infusion 1000 Unit(s)/Hr (10 mL/Hr) IV Continuous <Continuous>  levothyroxine 112 MICROGram(s) Oral daily    MEDICATIONS  (PRN):  acetaminophen   Tablet .. 325 milliGRAM(s) Oral every 4 hours PRN Mild Pain (1 - 3)  diazepam    Tablet 5 milliGRAM(s) Oral every 6 hours PRN spasm  HYDROmorphone  Injectable 0.5 milliGRAM(s) IV Push every 4 hours PRN breakthrough pain  ondansetron    Tablet 4 milliGRAM(s) Oral every 6 hours PRN Nausea  oxyCODONE    IR 5 milliGRAM(s) Oral every 4 hours PRN Moderate Pain (4 - 6)  oxyCODONE    IR 10 milliGRAM(s) Oral every 4 hours PRN Severe Pain (7 - 10)      Vital Signs Last 24 Hrs  T(C): 36.8 (07 Sep 2021 05:50), Max: 36.9 (06 Sep 2021 14:13)  T(F): 98.3 (07 Sep 2021 05:50), Max: 98.5 (06 Sep 2021 14:13)  HR: 90 (07 Sep 2021 08:36) (90 - 108)  BP: 115/75 (07 Sep 2021 08:36) (97/67 - 118/63)  RR: 16 (07 Sep 2021 08:36) (16 - 22)  SpO2: 99% (07 Sep 2021 08:36) (95% - 99%)    PHYSICAL EXAM:  Constitutional: NAD, well-groomed, well-developed  HEENT: Normal Hearing, MMM, conjunctiva clear, anicteric sclera   Back: Normal spine flexure, No CVA tenderness  Respiratory: CTAB  Cardiovascular: S1 and S2, RRR, no M/G/R  Gastrointestinal: BS+, soft, NT/ND, abdominal binder in place, ALOK drain x 2 in place with minimal output.                             8.3    8.12  )-----------( 254      ( 07 Sep 2021 05:44 )             25.5       09-06    143  |  108  |  16  ----------------------------<  112<H>  3.5   |  29  |  0.65    Ca    7.9<L>      06 Sep 2021 05:51      PTT - ( 07 Sep 2021 05:44 )  PTT:66.9 sec    
PULMONARY CONSULT SERVICE FOLLOW-UP NOTE    INTERVAL HPI:  Reviewed chart and overnight events; patient seen and examined at bedside. No acute events o/n. Abdominal pain and fullness is improved. Getting transfused for post operative anemia. No other bleeding noted on heparin gtt.     MEDICATIONS:  Pulmonary:    Antimicrobials:  ceFAZolin   IVPB 1000 milliGRAM(s) IV Intermittent every 8 hours    Anticoagulants:  heparin  Infusion 1000 Unit(s)/Hr IV Continuous <Continuous>    Cardiac:      Allergies    No Known Allergies    Intolerances        Vital Signs Last 24 Hrs  T(C): 36.9 (06 Sep 2021 18:40), Max: 36.9 (05 Sep 2021 22:01)  T(F): 98.4 (06 Sep 2021 18:40), Max: 98.5 (06 Sep 2021 10:44)  HR: 101 (06 Sep 2021 19:36) (86 - 107)  BP: 107/73 (06 Sep 2021 19:36) (97/67 - 124/85)  BP(mean): --  RR: 16 (06 Sep 2021 18:40) (12 - 18)  SpO2: 96% (06 Sep 2021 18:40) (95% - 98%)     @ 07:  -   @ 07:00  --------------------------------------------------------  IN: 1624 mL / OUT: 3030 mL / NET: -1406 mL     @ 07: @ 20:28  --------------------------------------------------------  IN: 469 mL / OUT: 502 mL / NET: -33 mL      PHYSICAL EXAM:  Constitutional: WDWN  Head: NC/AT  EENT: PERRL, anicteric sclera; oropharynx clear, MMM  Neck: supple, no appreciable JVD  Respiratory: CTA B/L; no W/R/R  Cardiovascular: +S1/S2, RRR  Gastrointestinal: wrapped, c/d/i. distended. mild TTP  Extremities: WWP; no edema, clubbing or cyanosis  Vascular: 2+ radial pulses B/L  Neurological: awake and alert; BONNER    LABS:      CBC Full  -  ( 06 Sep 2021 16:52 )  WBC Count : 9.22 K/uL  RBC Count : 3.03 M/uL  Hemoglobin : 9.0 g/dL  Hematocrit : 27.3 %  Platelet Count - Automated : 259 K/uL  Mean Cell Volume : 90.1 fl  Mean Cell Hemoglobin : 29.7 pg  Mean Cell Hemoglobin Concentration : 33.0 gm/dL  Auto Neutrophil # : x  Auto Lymphocyte # : x  Auto Monocyte # : x  Auto Eosinophil # : x  Auto Basophil # : x  Auto Neutrophil % : x  Auto Lymphocyte % : x  Auto Monocyte % : x  Auto Eosinophil % : x  Auto Basophil % : x        143  |  108  |  16  ----------------------------<  112<H>  3.5   |  29  |  0.65    Ca    7.9<L>      06 Sep 2021 05:51  Mg     1.4         TPro  5.2<L>  /  Alb  2.9<L>  /  TBili  0.2  /  DBili  x   /  AST  31  /  ALT  21  /  AlkPhos  63  -    PT/INR - ( 05 Sep 2021 05:06 )   PT: 13.9 sec;   INR: 1.17          PTT - ( 06 Sep 2021 16:49 )  PTT:57.5 sec      Urinalysis Basic - ( 05 Sep 2021 00:41 )    Color: Yellow / Appearance: Clear / S.015 / pH: x  Gluc: x / Ketone: NEGATIVE  / Bili: Negative / Urobili: 0.2 E.U./dL   Blood: x / Protein: NEGATIVE mg/dL / Nitrite: NEGATIVE   Leuk Esterase: NEGATIVE / RBC: > 10 /HPF / WBC < 5 /HPF   Sq Epi: x / Non Sq Epi: 0-5 /HPF / Bacteria: None /HPF                RADIOLOGY & ADDITIONAL STUDIES:
SUBJECTIVE: No acute events overnight. Pt examined at bedside feeling well. Complaining of some mild abdominal tenderness. Breathing well on RA. Tolerating diet and ambulating. Denies N/V, CP, SOB.      MEDICATIONS  (STANDING):  buPROPion XL (24-Hour) . 450 milliGRAM(s) Oral daily  ceFAZolin   IVPB 1000 milliGRAM(s) IV Intermittent every 8 hours  heparin  Infusion 1000 Unit(s)/Hr (10 mL/Hr) IV Continuous <Continuous>  influenza   Vaccine 0.5 milliLiter(s) IntraMuscular once  levothyroxine 112 MICROGram(s) Oral daily    MEDICATIONS  (PRN):  acetaminophen   Tablet .. 325 milliGRAM(s) Oral every 4 hours PRN Mild Pain (1 - 3)  diazepam    Tablet 5 milliGRAM(s) Oral every 6 hours PRN spasm  HYDROmorphone  Injectable 0.5 milliGRAM(s) IV Push every 4 hours PRN breakthrough pain  ondansetron    Tablet 4 milliGRAM(s) Oral every 6 hours PRN Nausea  oxyCODONE    IR 5 milliGRAM(s) Oral every 4 hours PRN Moderate Pain (4 - 6)  oxyCODONE    IR 10 milliGRAM(s) Oral every 4 hours PRN Severe Pain (7 - 10)      Vital Signs Last 24 Hrs  T(C): 36.7 (08 Sep 2021 06:15), Max: 37 (07 Sep 2021 20:47)  T(F): 98 (08 Sep 2021 06:15), Max: 98.6 (07 Sep 2021 20:47)  HR: 97 (08 Sep 2021 06:15) (90 - 110)  BP: 104/68 (08 Sep 2021 06:15) (104/68 - 123/72)  BP(mean): --  RR: 18 (08 Sep 2021 06:15) (15 - 20)  SpO2: 97% (08 Sep 2021 06:15) (96% - 99%)    Physical Exam:  General: NAD  Breast: Incisions c/d/i, no collections  Pulm: breathing comfortably on RA  Abdomen: Abdominal binder in place, soft, very minimally TTP along incision, incision c/d/i, no collections, ALOK drains serosanguinous. R ALOK: 50cc, L ALOK: 30cc    I&O's Summary    07 Sep 2021 07:01  -  08 Sep 2021 07:00  --------------------------------------------------------  IN: 940 mL / OUT: 2910 mL / NET: -1970 mL        LABS:                        9.0    8.17  )-----------( 302      ( 08 Sep 2021 05:57 )             27.7           PTT - ( 08 Sep 2021 05:57 )  PTT:64.6 sec  
SUBJECTIVE: Pt examined at bedside. Complaining of mild abdominal tenderness. Tachy to 108 this morning but otherwise afebrile, normotensive, and saturating well on RA. Tolerating diet and ambulating with assistance. Denies CP, SOB, N/V.    MEDICATIONS  (STANDING):  buPROPion XL (24-Hour) . 450 milliGRAM(s) Oral daily  ceFAZolin   IVPB 1000 milliGRAM(s) IV Intermittent every 8 hours  heparin  Infusion 1100 Unit(s)/Hr (11 mL/Hr) IV Continuous <Continuous>  lactated ringers. 1000 milliLiter(s) (100 mL/Hr) IV Continuous <Continuous>  levothyroxine 112 MICROGram(s) Oral daily    MEDICATIONS  (PRN):  acetaminophen   Tablet .. 325 milliGRAM(s) Oral every 4 hours PRN Mild Pain (1 - 3)  diazepam    Tablet 5 milliGRAM(s) Oral every 6 hours PRN spasm  HYDROmorphone  Injectable 0.5 milliGRAM(s) IV Push every 4 hours PRN breakthrough pain  ondansetron    Tablet 4 milliGRAM(s) Oral every 6 hours PRN Nausea  oxycodone    5 mG/acetaminophen 325 mG 1 Tablet(s) Oral every 4 hours PRN Moderate Pain (4 - 6)  oxycodone    5 mG/acetaminophen 325 mG 2 Tablet(s) Oral every 4 hours PRN Severe Pain (7 - 10)      Vital Signs Last 24 Hrs  T(C): 36.8 (07 Sep 2021 05:50), Max: 36.9 (06 Sep 2021 10:44)  T(F): 98.3 (07 Sep 2021 05:50), Max: 98.5 (06 Sep 2021 10:44)  HR: 96 (07 Sep 2021 05:50) (86 - 108)  BP: 113/80 (07 Sep 2021 05:50) (97/67 - 124/85)  BP(mean): --  RR: 19 (07 Sep 2021 05:50) (12 - 22)  SpO2: 96% (07 Sep 2021 05:50) (95% - 98%)    Physical Exam:  General: NAD  Breast: Incisions c/d/i, ecchymosis surrounding areola, no collections  Pulm: breathing comfortably on RA  Abdomen: Abdominal binder in place, soft, very minimally TTP along incision, incision c/d/i, no collections, ALOK drains serosanguinous. R ALOK: 97cc, L ALOK: 90cc    I&O's Summary    06 Sep 2021 07:01  -  07 Sep 2021 07:00  --------------------------------------------------------  IN: 947 mL / OUT: 1337 mL / NET: -390 mL        LABS:                        8.3    8.12  )-----------( 254      ( 07 Sep 2021 05:44 )             25.5     09-06    143  |  108  |  16  ----------------------------<  112<H>  3.5   |  29  |  0.65    Ca    7.9<L>      06 Sep 2021 05:51      PTT - ( 07 Sep 2021 05:44 )  PTT:66.9 sec  
S: POD0 s/p evacuation of abdominal wall hematoma. Pain much improved this am. No chest pain/dizziness/no shortness of breath. Has not ambulated yet. Mild tachycardia. Hematology consult pending. Given coagulopathy and diffuse bleeding intraoperatively, held anticoagulation overnight    O:   Tmax: 98.7  HR:   BP: 105/72  RR: 15  O2: 94%    NAD, A&Ox3  Breasts: incisions cdi, no hematoma/collection  Pulm: breathing comfortably on RA  CV: tachycardic to 120  Abdomen: soft, very minimally TTP along incision. No collections, ALOK drains serosanguinous. R ALOK: ~20cc, L ALOK: ~40cc, umbilicus viable, incision cdi
S: POD1 s/p evacuation of abdominal wall hematoma. Pain much improved this am. No chest pain/dizziness/no shortness of breath. On heparin gtt, slightly subtherapeutic, dose adjusted this am aPPT goal 50-65. 1U pRBCs transfused yesterday    O:   Tmax: 98.7  HR: 's  BP: 105/57  RR: 17  O2: 95%    NAD, A&Ox3  Breasts: incisions cdi, no hematoma/collection  Pulm: breathing comfortably on RA  CV: tachycardic to 120  Abdomen: soft, very minimally TTP along incision. No collections, ALOK drains serosanguinous. R ALOK: 100cc, L ALOK: 145cc, umbilicus viable, incision cdi

## 2021-09-08 NOTE — DISCHARGE NOTE PROVIDER - CARE PROVIDER_API CALL
Kaiden Trevioz)  Plastic Surgery; Surgery  [address] [place] [address]  Phone: [phone]  Fax: [phone]  Follow Up Time: 1 week    Lico Rico)  Critical Care Medicine; Internal Medicine; Pulmonary Disease  [address] [place] [address]  Phone: [phone]  Fax: [phone]  Follow Up Time: 1 week    Anel Kim)  Medical Oncology  [address] [address]  Phone: [phone]  Fax: [phone]  Follow Up Time: 1 week

## 2021-09-08 NOTE — PROGRESS NOTE ADULT - ASSESSMENT
56F s/p abdominoplasty, mastopexy on 8/31 with post-op DVT/PE, anticoagulated and found to have abdominal wall hematoma on 9/4/21 s/p evacuation of hematoma and control of bleeding on 9/5/21  - PTT within target goal 45-65. Discharge on Lovenox 70 BID and f/u with heme outpt  - Ambulate with assistance (while bent at the waist), SCDs  - Drain care. Please strip/measure/record Q4h  - Beach chair position while in bed, abdominal binder at all times  - Pain/nausea control  - H/H 9/27.7. Transfuse P RBC to Hb>7  - Discharge home today

## 2021-09-08 NOTE — DISCHARGE NOTE PROVIDER - PROVIDER TOKENS
PROVIDER:[TOKEN:[[id number]],FOLLOWUP:[1 week]],PROVIDER:[TOKEN:[[id number]],FOLLOWUP:[1 week]],PROVIDER:[TOKEN:[[id number]],FOLLOWUP:[1 week]]

## 2021-09-08 NOTE — DISCHARGE NOTE NURSING/CASE MANAGEMENT/SOCIAL WORK - PATIENT PORTAL LINK FT
You can access the FollowMyHealth Patient Portal offered by [facility] by registering at the following website: [email or web address]. By joining [employer]’s FollowMyHealth portal, you will also be able to view your health information using other applications (apps) compatible with our system.

## 2021-09-08 NOTE — DISCHARGE NOTE PROVIDER - NSDCCPCAREPLAN_GEN_ALL_CORE_FT
PRINCIPAL DISCHARGE DIAGNOSIS  Diagnosis: Abdominal wall hematoma  Assessment and Plan of Treatment: Resume normal diet. Avoid straining, exercise, or heavy lifting.  Take medications as instructed by prescriptions.  Walk in a hunched position until cleared.   You will be discharged with ALOK drains. You will need to empty them and record outputs accurately. This will be taught to you by the nursing staff. Please do not remove the ALOK drains. They will be removed in the office. Please bring to the office accurate records of output.   Please wear abdominal binder at all times except to bathe until seen in the office.  Please sponge bathe only until your first follow-up appointment.  Do not raise arms above head.  Follow-up with primary care doctor as well.   Call 911 and return to the ED for chest pain, shortness of breath, significant increase in pain, or significant change in color of surgical sites.      SECONDARY DISCHARGE DIAGNOSES  Diagnosis: Syncope  Assessment and Plan of Treatment:

## 2021-09-08 NOTE — DISCHARGE NOTE PROVIDER - NSDCFUADDAPPT_GEN_ALL_CORE_FT
Phone number to make hematology appointment with Dr. Kim in 1-2 weeks is [phone].    Please follow up with PMD and OB/GYN as discussed.

## 2021-09-08 NOTE — PROGRESS NOTE ADULT - REASON FOR ADMISSION
Abdominal pain, syncope s/p abdominoplasty, mastopexy on 8/31/21

## 2021-09-08 NOTE — DISCHARGE NOTE NURSING/CASE MANAGEMENT/SOCIAL WORK - NSDCFUADDAPPT_GEN_ALL_CORE_FT
Phone number to make hematology appointment with Dr. Kim in 1-2 weeks is [phone].    Please follow up with PMD and OB/GYN as discussed.

## 2021-09-08 NOTE — DISCHARGE NOTE PROVIDER - CARE PROVIDERS DIRECT ADDRESSES
,DirectAddress_Unknown,[email or web address]
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
## 2021-09-10 DIAGNOSIS — R55 SYNCOPE AND COLLAPSE: ICD-10-CM

## 2021-09-10 DIAGNOSIS — F32.9 MAJOR DEPRESSIVE DISORDER, SINGLE EPISODE, UNSPECIFIED: ICD-10-CM

## 2021-09-10 DIAGNOSIS — E03.9 HYPOTHYROIDISM, UNSPECIFIED: ICD-10-CM

## 2021-09-10 DIAGNOSIS — I80.10 PHLEBITIS AND THROMBOPHLEBITIS OF UNSPECIFIED FEMORAL VEIN: ICD-10-CM

## 2021-09-10 DIAGNOSIS — I26.99 OTHER PULMONARY EMBOLISM WITHOUT ACUTE COR PULMONALE: ICD-10-CM

## 2021-09-10 DIAGNOSIS — K91.871 POSTPROCEDURAL HEMATOMA OF A DIGESTIVE SYSTEM ORGAN OR STRUCTURE FOLLOWING OTHER PROCEDURE: ICD-10-CM

## 2021-09-10 DIAGNOSIS — F41.9 ANXIETY DISORDER, UNSPECIFIED: ICD-10-CM

## 2021-09-10 DIAGNOSIS — Z87.442 PERSONAL HISTORY OF URINARY CALCULI: ICD-10-CM

## 2021-09-10 DIAGNOSIS — K58.9 IRRITABLE BOWEL SYNDROME WITHOUT DIARRHEA: ICD-10-CM

## 2021-09-17 ENCOUNTER — OUTPATIENT (OUTPATIENT)
Dept: OUTPATIENT SERVICES | Facility: HOSPITAL | Age: 56
LOS: 1 days | Discharge: HOME | End: 2021-09-17

## 2021-09-17 DIAGNOSIS — I26.90 SEPTIC PULMONARY EMBOLISM WITHOUT ACUTE COR PULMONALE: ICD-10-CM

## 2021-09-17 DIAGNOSIS — Z98.890 OTHER SPECIFIED POSTPROCEDURAL STATES: Chronic | ICD-10-CM

## 2021-09-17 DIAGNOSIS — Z90.49 ACQUIRED ABSENCE OF OTHER SPECIFIED PARTS OF DIGESTIVE TRACT: Chronic | ICD-10-CM

## 2021-09-20 ENCOUNTER — LABORATORY RESULT (OUTPATIENT)
Age: 56
End: 2021-09-20

## 2021-09-20 ENCOUNTER — APPOINTMENT (OUTPATIENT)
Dept: HEMATOLOGY ONCOLOGY | Facility: CLINIC | Age: 56
End: 2021-09-20
Payer: COMMERCIAL

## 2021-09-20 ENCOUNTER — RESULT REVIEW (OUTPATIENT)
Age: 56
End: 2021-09-20

## 2021-09-20 VITALS
OXYGEN SATURATION: 98 % | RESPIRATION RATE: 12 BRPM | HEART RATE: 112 BPM | BODY MASS INDEX: 28.92 KG/M2 | TEMPERATURE: 98.4 F | WEIGHT: 159.17 LBS | HEIGHT: 62.13 IN | DIASTOLIC BLOOD PRESSURE: 78 MMHG | SYSTOLIC BLOOD PRESSURE: 117 MMHG

## 2021-09-20 DIAGNOSIS — S30.1XXA CONTUSION OF ABDOMINAL WALL, INITIAL ENCOUNTER: ICD-10-CM

## 2021-09-20 DIAGNOSIS — R59.0 LOCALIZED ENLARGED LYMPH NODES: ICD-10-CM

## 2021-09-20 LAB
BASOPHILS # BLD AUTO: 0.05 K/UL — SIGNIFICANT CHANGE UP (ref 0–0.2)
BASOPHILS NFR BLD AUTO: 0.9 % — SIGNIFICANT CHANGE UP (ref 0–2)
EOSINOPHIL # BLD AUTO: 0.13 K/UL — SIGNIFICANT CHANGE UP (ref 0–0.5)
EOSINOPHIL NFR BLD AUTO: 2.3 % — SIGNIFICANT CHANGE UP (ref 0–6)
HCT VFR BLD CALC: 36.2 % — SIGNIFICANT CHANGE UP (ref 34.5–45)
HGB BLD-MCNC: 11.6 G/DL — SIGNIFICANT CHANGE UP (ref 11.5–15.5)
IMM GRANULOCYTES NFR BLD AUTO: 0.2 % — SIGNIFICANT CHANGE UP (ref 0–1.5)
LYMPHOCYTES # BLD AUTO: 1.42 K/UL — SIGNIFICANT CHANGE UP (ref 1–3.3)
LYMPHOCYTES # BLD AUTO: 25.3 % — SIGNIFICANT CHANGE UP (ref 13–44)
MCHC RBC-ENTMCNC: 30.4 PG — SIGNIFICANT CHANGE UP (ref 27–34)
MCHC RBC-ENTMCNC: 32 G/DL — SIGNIFICANT CHANGE UP (ref 32–36)
MCV RBC AUTO: 95 FL — SIGNIFICANT CHANGE UP (ref 80–100)
MONOCYTES # BLD AUTO: 0.43 K/UL — SIGNIFICANT CHANGE UP (ref 0–0.9)
MONOCYTES NFR BLD AUTO: 7.7 % — SIGNIFICANT CHANGE UP (ref 2–14)
NEUTROPHILS # BLD AUTO: 3.58 K/UL — SIGNIFICANT CHANGE UP (ref 1.8–7.4)
NEUTROPHILS NFR BLD AUTO: 63.6 % — SIGNIFICANT CHANGE UP (ref 43–77)
NRBC # BLD: 0 /100 WBCS — SIGNIFICANT CHANGE UP (ref 0–0)
PLATELET # BLD AUTO: 598 K/UL — HIGH (ref 150–400)
RBC # BLD: 3.81 M/UL — SIGNIFICANT CHANGE UP (ref 3.8–5.2)
RBC # FLD: 15.6 % — HIGH (ref 10.3–14.5)
WBC # BLD: 5.62 K/UL — SIGNIFICANT CHANGE UP (ref 3.8–10.5)
WBC # FLD AUTO: 5.62 K/UL — SIGNIFICANT CHANGE UP (ref 3.8–10.5)

## 2021-09-20 PROCEDURE — 99205 OFFICE O/P NEW HI 60 MIN: CPT

## 2021-09-20 RX ORDER — ENOXAPARIN SODIUM 100 MG/ML
80 INJECTION SUBCUTANEOUS
Qty: 30 | Refills: 2 | Status: ACTIVE | COMMUNITY
Start: 2021-09-20 | End: 1900-01-01

## 2021-09-20 RX ORDER — OMEPRAZOLE 40 MG/1
40 CAPSULE, DELAYED RELEASE ORAL DAILY
Qty: 1 | Refills: 0 | Status: ACTIVE | COMMUNITY
Start: 2021-09-20

## 2021-09-24 PROBLEM — R59.0 SUPRACLAVICULAR LYMPHADENOPATHY: Status: ACTIVE | Noted: 2021-09-24

## 2021-09-24 LAB
APTT BLD: 31.7 SEC
B2 GLYCOPROT1 AB SER QL: NEGATIVE
CARDIOLIPIN AB SER IA-ACNC: NEGATIVE
DNA PLOIDY SPEC FC-IMP: NORMAL
INR PPP: 0.98 RATIO
PT BLD: 11.6 SEC
PTR INTERP: NORMAL

## 2021-09-24 NOTE — HISTORY OF PRESENT ILLNESS
[Disease:__________________________] : Disease: [unfilled] [de-identified] : 56F with PMHx of Hashimotos-related hypothyroidism, Hx of laparoscopic cholecystectomy, elevated BMI who is s/p abdominoplasty/mastopexy on 8/31/21, complicated by DVT, PE. She was admitted to [facility] on 9/2 after a syncopal event where the DVT/PE was diagnosed. She was treated with heparin and transitioned to Eliquis and discharged on 9/3/21. She experienced another syncopal event on 9/4 and was brought to [facility] by ambulance for evaluation, found to have an abdominal wall hematoma, was transferred to [facility] for management of the hematoma (where she had the initial surgery). Now s/p hematoma evacuation on 9/5. She was discharged from 9/8.\par \par Her PE was thought to have been provoked in setting of surgery. Due to risk of worsening PE outweighed the risk of bleeding so she was discharged on lovenox 1 mg/kg subq twice daily. \par \par She presents today with her  Vernon. She reports that she bruises easily. She had one miscarriage at 14 weeks. No history of blood clots. She denies any symptoms related to the PE at this time such as shortness of breath, chest pain, issues with exertion. She has not had any recent viral illnesses, no constitutional sx.\par \par She had a hysterectomy in January 2021 and still has her ovaries. She uses estrogen cream and a low dose of testosterone.\par \par Family Hx:\par \par \par Social Hx:\par , has 3 children\par Never smoker\par Occasional alcohol use [de-identified] : 9/20:21: Initial Visit.\par \par \par -----\par A comprehensive review of systems was performed including constitutional, eyes, ENT, cardiovascular, respiratory, gastrointestinal, genitourinary, musculoskeletal, integumentary, neurological, psychiatric and hematologic / lymphatic. All pertinent positives are included in the H&P under interval history above and the remaining review of systems listed are negative.

## 2021-09-24 NOTE — PHYSICAL EXAM
[Fully active, able to carry on all pre-disease performance without restriction] : Status 0 - Fully active, able to carry on all pre-disease performance without restriction [Normal] : affect appropriate [de-identified] : Soft, post-surgical whealing wounds, no hepatosplenomegaly [de-identified] : RIght supraclavicular 1 cm lymph node, firm, non-tender, non-mobile, 2x left inguinal subcentimeter LNs

## 2021-09-24 NOTE — ASSESSMENT
[FreeTextEntry1] : 57 yo female here for evaluation of an acute right upper pulmonary embolism. This occurred following an abdominoplasty surgery. Her course following the PE was complicated by abdominal wall hematoma while taking apixaban requiring intervention. She has since remained on enoxaparin 70 mg q12hr for her pulmonary embolism. She was referred here for additional workup for hypercoagulable state and management of her anticoagulation.\par \par At this time I believe that her pulmonary embolism was provoked related to her surgery. She also uses supplemental hormone therapy, which unclear her dosing, in perioperative period may somewhat increase the risk for thrombosis. To determine the duration of therapy we will check PT/PTT with reflex mixing studies, APLS labs, and Factor V leiden / prothrombin gene mutation to rule out common causes of coagulopathy. At the very least, she should be on anticoagulation for 3 months. \par \par Also on exam today she had left inguinal subcentimeter lymph nodes in the left inguinal region and right supraclavicular lymph node that was ~1 cm. This is likely related to her recent surgery, however should be monitored during and after full recovery from surgery. On her CT chest/Abdomen and Pelvis earlier this month on 9/2/21, lymphadenopathy was absent, thus suggesting a more transient nature post-operatively. Discussed the presence of the lymphadenopathy and need for monitoring with physical exam alone for now.\par \par CBC today: WBC 5.6, Hb 11.6, . She currently has thrombocytosis which was not present on earlier labs this month. Likely it is reactive in nature. No need for additional workup at this time.\par \par Plan:\par - PT/PTT and reflex mixing studies\par - Send hypercoagulable workup including Prothrombin gene mutation and Factor V Leiden gene testing, APLS labs\par - Additional hypercoagulable workup not indicated at this time\par - Continue Enoxaparin 1 mg/kg q12 hours, duration should be at least 3 months, which may change depending on hypercoagulable workup\par - DOACs or warfarin should be avoided as present in the until cleared by surgery\par - Follow-up imaging to monitor PE status is not recommended\par - Monitor right supraclavicular adenopathy closely for now, given post-operative period and likely inflammation, would watch for now and if any changes consider ultrasound guided biopsy\par - Med list incomplete, unknown dosages, can discuss next visit\par - Followup here in 6-8 weeks\par \par ---\par I personally have spent a total of 60 minutes of time on the date of this encounter reviewing test results, documenting findings, coordinating care and directly consulting with the patient and/or designated family member. 
6

## 2021-10-01 PROCEDURE — 36415 COLL VENOUS BLD VENIPUNCTURE: CPT

## 2021-10-01 PROCEDURE — 86769 SARS-COV-2 COVID-19 ANTIBODY: CPT

## 2021-10-01 PROCEDURE — 88302 TISSUE EXAM BY PATHOLOGIST: CPT

## 2021-10-04 PROCEDURE — 86900 BLOOD TYPING SEROLOGIC ABO: CPT

## 2021-10-04 PROCEDURE — 85610 PROTHROMBIN TIME: CPT

## 2021-10-04 PROCEDURE — 80053 COMPREHEN METABOLIC PANEL: CPT

## 2021-10-04 PROCEDURE — 86769 SARS-COV-2 COVID-19 ANTIBODY: CPT

## 2021-10-04 PROCEDURE — 81001 URINALYSIS AUTO W/SCOPE: CPT

## 2021-10-04 PROCEDURE — 83735 ASSAY OF MAGNESIUM: CPT

## 2021-10-04 PROCEDURE — 71045 X-RAY EXAM CHEST 1 VIEW: CPT

## 2021-10-04 PROCEDURE — 85027 COMPLETE CBC AUTOMATED: CPT

## 2021-10-04 PROCEDURE — 87635 SARS-COV-2 COVID-19 AMP PRB: CPT

## 2021-10-04 PROCEDURE — 99285 EMERGENCY DEPT VISIT HI MDM: CPT

## 2021-10-04 PROCEDURE — 36415 COLL VENOUS BLD VENIPUNCTURE: CPT

## 2021-10-04 PROCEDURE — 86850 RBC ANTIBODY SCREEN: CPT

## 2021-10-04 PROCEDURE — 84484 ASSAY OF TROPONIN QUANT: CPT

## 2021-10-04 PROCEDURE — 36430 TRANSFUSION BLD/BLD COMPNT: CPT

## 2021-10-04 PROCEDURE — 86901 BLOOD TYPING SEROLOGIC RH(D): CPT

## 2021-10-04 PROCEDURE — 83690 ASSAY OF LIPASE: CPT

## 2021-10-04 PROCEDURE — C9399: CPT

## 2021-10-04 PROCEDURE — 80048 BASIC METABOLIC PNL TOTAL CA: CPT

## 2021-10-04 PROCEDURE — 86923 COMPATIBILITY TEST ELECTRIC: CPT

## 2021-10-04 PROCEDURE — 85730 THROMBOPLASTIN TIME PARTIAL: CPT

## 2021-10-04 PROCEDURE — 85025 COMPLETE CBC W/AUTO DIFF WBC: CPT

## 2021-10-04 PROCEDURE — P9016: CPT

## 2021-10-12 ENCOUNTER — APPOINTMENT (OUTPATIENT)
Dept: HEMATOLOGY ONCOLOGY | Facility: CLINIC | Age: 56
End: 2021-10-12

## 2021-11-08 ENCOUNTER — OUTPATIENT (OUTPATIENT)
Dept: OUTPATIENT SERVICES | Facility: HOSPITAL | Age: 56
LOS: 1 days | Discharge: HOME | End: 2021-11-08

## 2021-11-08 DIAGNOSIS — Z98.890 OTHER SPECIFIED POSTPROCEDURAL STATES: Chronic | ICD-10-CM

## 2021-11-08 DIAGNOSIS — Z90.49 ACQUIRED ABSENCE OF OTHER SPECIFIED PARTS OF DIGESTIVE TRACT: Chronic | ICD-10-CM

## 2021-11-08 DIAGNOSIS — I26.99 OTHER PULMONARY EMBOLISM WITHOUT ACUTE COR PULMONALE: ICD-10-CM

## 2021-11-10 ENCOUNTER — RESULT REVIEW (OUTPATIENT)
Age: 56
End: 2021-11-10

## 2021-11-10 ENCOUNTER — APPOINTMENT (OUTPATIENT)
Dept: HEMATOLOGY ONCOLOGY | Facility: CLINIC | Age: 56
End: 2021-11-10
Payer: COMMERCIAL

## 2021-11-10 VITALS
RESPIRATION RATE: 13 BRPM | WEIGHT: 155.42 LBS | HEART RATE: 111 BPM | SYSTOLIC BLOOD PRESSURE: 126 MMHG | BODY MASS INDEX: 28.24 KG/M2 | HEIGHT: 62.13 IN | DIASTOLIC BLOOD PRESSURE: 80 MMHG | OXYGEN SATURATION: 99 % | TEMPERATURE: 98 F

## 2021-11-10 LAB
BASOPHILS # BLD AUTO: 0.03 K/UL — SIGNIFICANT CHANGE UP (ref 0–0.2)
BASOPHILS NFR BLD AUTO: 0.6 % — SIGNIFICANT CHANGE UP (ref 0–2)
EOSINOPHIL # BLD AUTO: 0.05 K/UL — SIGNIFICANT CHANGE UP (ref 0–0.5)
EOSINOPHIL NFR BLD AUTO: 1 % — SIGNIFICANT CHANGE UP (ref 0–6)
HCT VFR BLD CALC: 40.2 % — SIGNIFICANT CHANGE UP (ref 34.5–45)
HGB BLD-MCNC: 13.2 G/DL — SIGNIFICANT CHANGE UP (ref 11.5–15.5)
IMM GRANULOCYTES NFR BLD AUTO: 0.2 % — SIGNIFICANT CHANGE UP (ref 0–1.5)
LYMPHOCYTES # BLD AUTO: 1.18 K/UL — SIGNIFICANT CHANGE UP (ref 1–3.3)
LYMPHOCYTES # BLD AUTO: 22.7 % — SIGNIFICANT CHANGE UP (ref 13–44)
MCHC RBC-ENTMCNC: 29.7 PG — SIGNIFICANT CHANGE UP (ref 27–34)
MCHC RBC-ENTMCNC: 32.8 G/DL — SIGNIFICANT CHANGE UP (ref 32–36)
MCV RBC AUTO: 90.5 FL — SIGNIFICANT CHANGE UP (ref 80–100)
MONOCYTES # BLD AUTO: 0.44 K/UL — SIGNIFICANT CHANGE UP (ref 0–0.9)
MONOCYTES NFR BLD AUTO: 8.5 % — SIGNIFICANT CHANGE UP (ref 2–14)
NEUTROPHILS # BLD AUTO: 3.49 K/UL — SIGNIFICANT CHANGE UP (ref 1.8–7.4)
NEUTROPHILS NFR BLD AUTO: 67 % — SIGNIFICANT CHANGE UP (ref 43–77)
NRBC # BLD: 0 /100 WBCS — SIGNIFICANT CHANGE UP (ref 0–0)
PLATELET # BLD AUTO: 387 K/UL — SIGNIFICANT CHANGE UP (ref 150–400)
RBC # BLD: 4.44 M/UL — SIGNIFICANT CHANGE UP (ref 3.8–5.2)
RBC # FLD: 13.2 % — SIGNIFICANT CHANGE UP (ref 10.3–14.5)
WBC # BLD: 5.2 K/UL — SIGNIFICANT CHANGE UP (ref 3.8–10.5)
WBC # FLD AUTO: 5.2 K/UL — SIGNIFICANT CHANGE UP (ref 3.8–10.5)

## 2021-11-10 PROCEDURE — 99214 OFFICE O/P EST MOD 30 MIN: CPT

## 2021-11-12 NOTE — PHYSICAL EXAM
[Fully active, able to carry on all pre-disease performance without restriction] : Status 0 - Fully active, able to carry on all pre-disease performance without restriction [Normal] : PERRL, EOMI, no conjunctival infection, anicteric [de-identified] : Soft, surgical scar across the abdomen below the umbilicus [de-identified] : RIght supraclavicular 1 cm fullness (unchanged since 9/2021), no inguinal adenopathy present at follow-up

## 2021-11-12 NOTE — HISTORY OF PRESENT ILLNESS
[Disease:__________________________] : Disease: [unfilled] [de-identified] : 56F with PMHx of Hashimotos-related hypothyroidism, Hx of laparoscopic cholecystectomy, elevated BMI who is s/p abdominoplasty/mastopexy on 8/31/21, complicated by DVT, PE. She was admitted to [facility] on 9/2 after a syncopal event where the DVT/PE was diagnosed. She was treated with heparin and transitioned to Eliquis and discharged on 9/3/21. She experienced another syncopal event on 9/4 and was brought to [facility] by ambulance for evaluation, found to have an abdominal wall hematoma, was transferred to [facility] for management of the hematoma (where she had the initial surgery). Now s/p hematoma evacuation on 9/5. She was discharged from 9/8.\par \par Her PE was thought to have been provoked in setting of surgery. Due to risk of worsening PE outweighed the risk of bleeding so she was discharged on lovenox 1 mg/kg subq twice daily. \par \par She presents today with her  Vernon. She reports that she bruises easily. She had one miscarriage at 14 weeks. No history of blood clots. She denies any symptoms related to the PE at this time such as shortness of breath, chest pain, issues with exertion. She has not had any recent viral illnesses, no constitutional sx.\par \par She had a hysterectomy in January 2021 and still has her ovaries. She uses estrogen cream and a low dose of testosterone.\par \par Social Hx:\par , has 3 children\par Never smoker\par Occasional alcohol use\par No illicit drug use\par \par Ob/Gyn Surgeon: Dr. Linda Bernard [phone]\par PMD: Dr. Anaya Jasmine @ [facility] [FreeTextEntry1] : Lovenox followed by Apixaban for 6 months total duration [de-identified] : 9/20:21: Initial Visit.\par \par 11/10/21: Ms Bonilla presents today for followup for and acute PE in Sept 2021 PE on therapy (currently on apixaban). Today she is feeling well, but complaints that she has yet to return to her pre-surgical activity levels. She also has issues with sleeping and had been getting some relief previously with hormone therapy. No cough, no SOB at rest or on exertion, no claudication complaints, no abdominal complaints besides tightness around surgical scar. No swelling in her legs, however she still feels areas of scarring where she injected lovenox previously. She is tolerating apixaban well, but does bruise easily. no other bleeding.\par \par -----\par A comprehensive review of systems was performed including constitutional, eyes, ENT, cardiovascular, respiratory, gastrointestinal, genitourinary, musculoskeletal, integumentary, neurological, psychiatric and hematologic / lymphatic. All pertinent positives are included in the H&P under interval history above and the remaining review of systems listed are negative.

## 2021-11-12 NOTE — ASSESSMENT
[FreeTextEntry1] : 55 yo female here for evaluation and further management of an acute right upper segmental pulmonary embolism. This occurred following an abdominoplasty surgery. Her course following the PE was complicated by abdominal wall hematoma while taking apixaban requiring intervention.  She has since remained on enoxaparin 70 mg q12hr for her pulmonary embolism. She was referred here for additional workup for hypercoagulable state and management of her anticoagulation.\par \par We performed PT/PTT which was normal with no significant inhibitors of coagulation, APLS workup was negative, in addition relatively common gene mutations were also negative which included Prothrombin gene mutation and Factor V Leiden genes. Further hypercoagulable investigations are not indicated at this time. Duration of anticoagulation for a pulmonary embolism that is presumed to have occurred in the post-op period is generally at least 3 months, but can be up to 6 months. I believe that her pulmonary embolism was provoked related to her surgery,  however this is based on probability and there is no way to be completely certain, this was explained / discussed with Ms Bonilla. \par \par Plan:\par - Continue Apixaban for total anticoagulant duration of 6 months. \par - She is still not back to her pre-op activity / routine and is still healing so apixaban should be continued throughout her recovery period\par - No need for follow-up imaging, follow-up imaging is not standard of care, no signs of symptoms at this time suggestive of persistent blood clot\par - Systemic hormone supplements are known to increase the relative risk of developing thromboses. Although this risk is difficult to quantify, it is present and I would advise against continuing systemic hormone treatment in any patient with the history of an acute pulmonary embolism, regardless of the presumed etiology\par - Right supraclavicular fullness, unchanged from last exam, prior CT scans unremarkable, has history of a significant accident, possibly anatomical, advised her to monitor with PMD. A sonogram may be indicated if this had not been present on prior exams with PMD. Patient feels it is chronic in nature, but will follow-up.\par - Follow-up visit in 3 months\par \par \par ---\par I personally have spent a total of 30 minutes of time on the date of this encounter reviewing test results, documenting findings, coordinating care and directly consulting with the patient and/or designated family member.

## 2021-11-12 NOTE — ADDENDUM
[FreeTextEntry1] : Updated results 9/24/21:\par The hypercoagulable workup has been unremarkable. At this time she should be considered as having a provoked, first occurrence VTE / pulmonary embolism. Per established guidelines she should continue with at least 3 months of anticoagulation therapy. Once cleared by surgery in terms of bleeding risk related to the procedure itself and her healing status, we can switch to apixaban 5 mg every 12 hours.\par

## 2022-01-19 ENCOUNTER — EMERGENCY (EMERGENCY)
Facility: HOSPITAL | Age: 57
LOS: 1 days | Discharge: HOME | End: 2022-01-19
Attending: STUDENT IN AN ORGANIZED HEALTH CARE EDUCATION/TRAINING PROGRAM | Admitting: EMERGENCY MEDICINE
Payer: COMMERCIAL

## 2022-01-19 VITALS
OXYGEN SATURATION: 99 % | SYSTOLIC BLOOD PRESSURE: 140 MMHG | RESPIRATION RATE: 18 BRPM | TEMPERATURE: 98 F | HEART RATE: 108 BPM | DIASTOLIC BLOOD PRESSURE: 89 MMHG | HEIGHT: 63 IN

## 2022-01-19 DIAGNOSIS — Z90.49 ACQUIRED ABSENCE OF OTHER SPECIFIED PARTS OF DIGESTIVE TRACT: Chronic | ICD-10-CM

## 2022-01-19 DIAGNOSIS — Z98.890 OTHER SPECIFIED POSTPROCEDURAL STATES: Chronic | ICD-10-CM

## 2022-01-19 LAB
ALBUMIN SERPL ELPH-MCNC: 4.4 G/DL — SIGNIFICANT CHANGE UP (ref 3.3–5)
ALP SERPL-CCNC: 91 U/L — SIGNIFICANT CHANGE UP (ref 40–120)
ALT FLD-CCNC: 20 U/L — SIGNIFICANT CHANGE UP (ref 4–33)
ANION GAP SERPL CALC-SCNC: 13 MMOL/L — SIGNIFICANT CHANGE UP (ref 7–14)
APTT BLD: 38.1 SEC — HIGH (ref 27–36.3)
AST SERPL-CCNC: 21 U/L — SIGNIFICANT CHANGE UP (ref 4–32)
BASOPHILS # BLD AUTO: 0.02 K/UL — SIGNIFICANT CHANGE UP (ref 0–0.2)
BASOPHILS NFR BLD AUTO: 0.7 % — SIGNIFICANT CHANGE UP (ref 0–2)
BILIRUB SERPL-MCNC: <0.2 MG/DL — SIGNIFICANT CHANGE UP (ref 0.2–1.2)
BUN SERPL-MCNC: 12 MG/DL — SIGNIFICANT CHANGE UP (ref 7–23)
CALCIUM SERPL-MCNC: 9.4 MG/DL — SIGNIFICANT CHANGE UP (ref 8.4–10.5)
CHLORIDE SERPL-SCNC: 99 MMOL/L — SIGNIFICANT CHANGE UP (ref 98–107)
CO2 SERPL-SCNC: 27 MMOL/L — SIGNIFICANT CHANGE UP (ref 22–31)
CREAT SERPL-MCNC: 0.72 MG/DL — SIGNIFICANT CHANGE UP (ref 0.5–1.3)
D DIMER BLD IA.RAPID-MCNC: <150 NG/ML DDU — SIGNIFICANT CHANGE UP
EOSINOPHIL # BLD AUTO: 0.06 K/UL — SIGNIFICANT CHANGE UP (ref 0–0.5)
EOSINOPHIL NFR BLD AUTO: 2 % — SIGNIFICANT CHANGE UP (ref 0–6)
GLUCOSE SERPL-MCNC: 75 MG/DL — SIGNIFICANT CHANGE UP (ref 70–99)
HCT VFR BLD CALC: 42.7 % — SIGNIFICANT CHANGE UP (ref 34.5–45)
HGB BLD-MCNC: 14.1 G/DL — SIGNIFICANT CHANGE UP (ref 11.5–15.5)
IANC: 1.34 K/UL — LOW (ref 1.5–8.5)
IMM GRANULOCYTES NFR BLD AUTO: 0.3 % — SIGNIFICANT CHANGE UP (ref 0–1.5)
INR BLD: 1.24 RATIO — HIGH (ref 0.88–1.16)
LYMPHOCYTES # BLD AUTO: 1.17 K/UL — SIGNIFICANT CHANGE UP (ref 1–3.3)
LYMPHOCYTES # BLD AUTO: 39.3 % — SIGNIFICANT CHANGE UP (ref 13–44)
MCHC RBC-ENTMCNC: 28.5 PG — SIGNIFICANT CHANGE UP (ref 27–34)
MCHC RBC-ENTMCNC: 33 GM/DL — SIGNIFICANT CHANGE UP (ref 32–36)
MCV RBC AUTO: 86.4 FL — SIGNIFICANT CHANGE UP (ref 80–100)
MONOCYTES # BLD AUTO: 0.38 K/UL — SIGNIFICANT CHANGE UP (ref 0–0.9)
MONOCYTES NFR BLD AUTO: 12.8 % — SIGNIFICANT CHANGE UP (ref 2–14)
NEUTROPHILS # BLD AUTO: 1.34 K/UL — LOW (ref 1.8–7.4)
NEUTROPHILS NFR BLD AUTO: 44.9 % — SIGNIFICANT CHANGE UP (ref 43–77)
NRBC # BLD: 0 /100 WBCS — SIGNIFICANT CHANGE UP
NRBC # FLD: 0 K/UL — SIGNIFICANT CHANGE UP
NT-PROBNP SERPL-SCNC: 15 PG/ML — SIGNIFICANT CHANGE UP
PLATELET # BLD AUTO: 334 K/UL — SIGNIFICANT CHANGE UP (ref 150–400)
POTASSIUM SERPL-MCNC: 3.9 MMOL/L — SIGNIFICANT CHANGE UP (ref 3.5–5.3)
POTASSIUM SERPL-SCNC: 3.9 MMOL/L — SIGNIFICANT CHANGE UP (ref 3.5–5.3)
PROT SERPL-MCNC: 7.9 G/DL — SIGNIFICANT CHANGE UP (ref 6–8.3)
PROTHROM AB SERPL-ACNC: 14 SEC — HIGH (ref 10.6–13.6)
RBC # BLD: 4.94 M/UL — SIGNIFICANT CHANGE UP (ref 3.8–5.2)
RBC # FLD: 13.8 % — SIGNIFICANT CHANGE UP (ref 10.3–14.5)
SARS-COV-2 RNA SPEC QL NAA+PROBE: SIGNIFICANT CHANGE UP
SODIUM SERPL-SCNC: 139 MMOL/L — SIGNIFICANT CHANGE UP (ref 135–145)
T4 FREE SERPL-MCNC: 1.4 NG/DL — SIGNIFICANT CHANGE UP (ref 0.9–1.8)
TROPONIN T, HIGH SENSITIVITY RESULT: <6 NG/L — SIGNIFICANT CHANGE UP
TSH SERPL-MCNC: 10.02 UIU/ML — HIGH (ref 0.27–4.2)
WBC # BLD: 2.98 K/UL — LOW (ref 3.8–10.5)
WBC # FLD AUTO: 2.98 K/UL — LOW (ref 3.8–10.5)

## 2022-01-19 PROCEDURE — 71275 CT ANGIOGRAPHY CHEST: CPT | Mod: 26,MA

## 2022-01-19 PROCEDURE — 99220: CPT

## 2022-01-19 PROCEDURE — 93971 EXTREMITY STUDY: CPT | Mod: 26

## 2022-01-19 RX ORDER — LEVOTHYROXINE SODIUM 125 MCG
112 TABLET ORAL DAILY
Refills: 0 | Status: DISCONTINUED | OUTPATIENT
Start: 2022-01-19 | End: 2022-01-22

## 2022-01-19 RX ORDER — SODIUM CHLORIDE 9 MG/ML
1000 INJECTION INTRAMUSCULAR; INTRAVENOUS; SUBCUTANEOUS ONCE
Refills: 0 | Status: COMPLETED | OUTPATIENT
Start: 2022-01-19 | End: 2022-01-19

## 2022-01-19 RX ORDER — APIXABAN 2.5 MG/1
5 TABLET, FILM COATED ORAL EVERY 12 HOURS
Refills: 0 | Status: DISCONTINUED | OUTPATIENT
Start: 2022-01-19 | End: 2022-01-22

## 2022-01-19 RX ORDER — ASPIRIN/CALCIUM CARB/MAGNESIUM 324 MG
162 TABLET ORAL ONCE
Refills: 0 | Status: DISCONTINUED | OUTPATIENT
Start: 2022-01-19 | End: 2022-01-19

## 2022-01-19 RX ADMIN — SODIUM CHLORIDE 1000 MILLILITER(S): 9 INJECTION INTRAMUSCULAR; INTRAVENOUS; SUBCUTANEOUS at 16:40

## 2022-01-19 NOTE — ED PROVIDER NOTE - OBJECTIVE STATEMENT
55 y/o female with pmhx of PE s/p abdominoplasty in 8/2021, complicated by abdominal wall hematoma, on Eliquis, hypothyroidism on synthroid, presents to ED c/o presyncope, chest pain and palpitations today. Pt states she felt similar to when she had a PE in the past with syncope. Denies LOC today. No SOB, abd pain, n/v, fevers or chills. Pt received her Moderna covid booster 3 days ago in left arm. Pt states she has had right arm pain that is diffuse x 2 weeks. No swelling. No calf pain.

## 2022-01-19 NOTE — ED ADULT TRIAGE NOTE - CHIEF COMPLAINT QUOTE
c/o near syncope this am with loss of balance as per pt ( c/o right shoulder pain  since december) had booster shot last saturday

## 2022-01-19 NOTE — ED PROVIDER NOTE - ATTENDING CONTRIBUTION TO CARE
56F w/ pmhx of PE s/p abdominoplasty in 8/2021, complicated by abdominal wall hematoma, on Eliquis, hypothyroidism on synthroid, p/w 1 day of chest pain and palpitations that started after an episode of near syncope. States she had similar symptoms when she was last diagnosed w/ a PE. Denies loc today. Denies sob, n/v/d, fevers, chills, abdominal pain. States she had her booster 3 days ago.     Except as documented in the HPI,  all other systems are negative    CONSTITUTIONAL: NAD, awake, alert  HEAD: Normocephalic; atraumatic  ENMT: External appears normal, MMM  NECK: no tenderness, FROM  CARD: Normal Sl, S2; no audible murmurs  RESP: normal wob, lungs ctab  ABD: soft, non-distended; non-tender  MSK: no pedal edema, normal ROM in all four extremities  SKIN: Warm, dry, no rashes  NEURO: aaox3, moving all extremities spontaneously    56F w/ pmhx of PE s/p abdominoplasty in 8/2021, complicated by abdominal wall hematoma, on Eliquis, hypothyroidism on synthroid, p/w 1 day of chest pain and palpitations, will obtain trop, bnp, ekg given recent booster, cta r/o pe given h/o pe. Patient states she is compliant w/ eliquis, no tearing sensation, no neuro deficits, low suspicion for dissection, no infectious symptoms

## 2022-01-19 NOTE — ED CDU PROVIDER INITIAL DAY NOTE - OBJECTIVE STATEMENT
57 y/o female with a hx of hypothyroidism PE s/p abdominoplasty on 8/2021 on Eliquis presents to the ER c/o near syncope, palpitations and chest pain that occurred this AM.  Pt reports similar feeling when she developed her PE in 2021.  Pt denies loc, sob, fevers, chills, cough.

## 2022-01-19 NOTE — ED CDU PROVIDER INITIAL DAY NOTE - MEDICAL DECISION MAKING DETAILS
55 y/o female with a hx of hypothyroidism PE s/p abdominoplasty on 8/2021 on Eliquis presents to the ER c/o near syncope, palpitations and chest pain that occurred this AM.  Pt placed in CDU for cardiac monitoring, echo, Tele doc.

## 2022-01-19 NOTE — ED ADULT NURSE REASSESSMENT NOTE - NS ED NURSE REASSESS COMMENT FT1
Pt a/ox4, alert, resting comfortably in stretcher. Respirations even and unlabored. Pt in no active distress. Pt given food tray. Normal saline fluid bolus running. Awaiting CDU bed assignment.

## 2022-01-19 NOTE — ED PROVIDER NOTE - MUSCULOSKELETAL, MLM
Spine appears normal, range of motion is not limited, no muscle or joint tenderness. Right UE nontender no swelling pulse 2+

## 2022-01-19 NOTE — ED PROVIDER NOTE - CLINICAL SUMMARY MEDICAL DECISION MAKING FREE TEXT BOX
55 y/o female with pmhx of PE s/p abdominoplasty in 8/2021, complicated by abdominal wall hematoma, on Eliquis, hypothyroidism on synthroid, presents to ED c/o presyncope, chest pain and palpitations today. Pt states she felt similar to when she had a PE in the past with syncope. Denies LOC today. No SOB, abd pain, n/v, fevers or chills. Pt received her Moderna covid booster 3 days ago in left arm. Pt states she has had right arm pain that is diffuse x 2 weeks. No swelling. No calf pain. on exam pt anxious well appearing tachycardic. plan to check labs including troponin, CTA r/o PE, tele monitor, US right UE r/o dvt

## 2022-01-19 NOTE — ED PROVIDER NOTE - PROGRESS NOTE DETAILS
PREETI Rao- Pt reports losing 48 lbs in 2 months and is taking two eight loss medications Sexenda and Lomria and being "monitored by a specialist." Concern medications causing patient's anxiety and tachycardia will hydrate. plan for cdu for echo, fluids, tele monitoring and tele doc consult

## 2022-01-19 NOTE — ED CDU PROVIDER INITIAL DAY NOTE - DETAILS
57 y/o female with a hx of hypothyroidism PE s/p abdominoplasty on 8/2021 on Eliquis presents to the ER c/o near syncope, palpitations and chest pain that occurred this AM.  Pt placed in CDU for cardiac monitoring, echo, Tele doc.

## 2022-01-19 NOTE — ED ADULT NURSE NOTE - OBJECTIVE STATEMENT
Pt received in spot 22A. Pt a/ox4, ambulatory at baseline. Pt c/o sob, nausea, dizziness, loss of balance that began this morning. Pt states she felt dizzy and went to lay down in bed where she felt the room was spinning. Pt denies loc. Pt reports history of PE in september. Pt also reports pain in right shoulder that started in december. Pt currently denies sob, chest pain, n, v/d, fever, chills, headache. Respirations even and unlabored. Abdomen soft, nondistended, nontender. Pt received in spot 22A. Pt a/ox4, ambulatory at baseline. Pt c/o sob, nausea, dizziness, loss of balance that began this morning. Pt states she felt dizzy and went to lay down in bed where she felt the room was spinning. Pt denies loc. Pt reports history of PE in september. Pt also reports pain in right shoulder that started in december. Pt currently denies sob, chest pain, n, v/d, fever, chills, headache. PMH of Hashimoto's. Respirations even and unlabored. Abdomen soft, nondistended, nontender. 20 G placed on left ac, labs drawn and sent. Awaiting CT. Pt received in spot 22A. Pt a/ox4, ambulatory at baseline. Pt c/o sob, nausea, dizziness, loss of balance that began this morning. Pt states she felt dizzy and went to lay down in bed where she felt the room was spinning. Pt denies loc. Pt reports history of PE in september. Pt also reports pain in right shoulder that started in december. Pt currently denies sob, chest pain, n, v/d, fever, chills, headache. PMH of Hashimoto's. Respirations even and unlabored. Abdomen soft, nondistended, nontender. 20 G placed on left ac, labs drawn and sent. NSR on cardiac monitor. Awaiting CT.

## 2022-01-19 NOTE — ED CDU PROVIDER INITIAL DAY NOTE - ATTENDING CONTRIBUTION TO CARE
56F w h/o hypothyroidism, PE s/p surgery in 08/21, on eliquis, p/s near syncope, palpitations, chest pain, placed in obs for echo, stress test, cards cs        55 y/o female with a hx of hypothyroidism PE s/p abdominoplasty on 8/2021 on Eliquis presents to the ER c/o near syncope, palpitations and chest pain that occurred this AM.  Pt placed in CDU for cardiac monitoring, echo, Tele doc

## 2022-01-20 VITALS
RESPIRATION RATE: 16 BRPM | OXYGEN SATURATION: 100 % | HEART RATE: 105 BPM | DIASTOLIC BLOOD PRESSURE: 78 MMHG | SYSTOLIC BLOOD PRESSURE: 126 MMHG | TEMPERATURE: 98 F

## 2022-01-20 LAB — T3FREE SERPL-MCNC: 2.71 PG/ML — SIGNIFICANT CHANGE UP (ref 1.8–4.6)

## 2022-01-20 PROCEDURE — 99217: CPT

## 2022-01-20 PROCEDURE — 93016 CV STRESS TEST SUPVJ ONLY: CPT | Mod: GC

## 2022-01-20 PROCEDURE — 93018 CV STRESS TEST I&R ONLY: CPT | Mod: GC

## 2022-01-20 PROCEDURE — 93306 TTE W/DOPPLER COMPLETE: CPT | Mod: 26

## 2022-01-20 PROCEDURE — 78452 HT MUSCLE IMAGE SPECT MULT: CPT | Mod: 26,ME

## 2022-01-20 RX ADMIN — Medication 112 MICROGRAM(S): at 05:42

## 2022-01-20 RX ADMIN — APIXABAN 5 MILLIGRAM(S): 2.5 TABLET, FILM COATED ORAL at 00:14

## 2022-01-20 RX ADMIN — APIXABAN 5 MILLIGRAM(S): 2.5 TABLET, FILM COATED ORAL at 09:02

## 2022-01-20 RX ADMIN — APIXABAN 5 MILLIGRAM(S): 2.5 TABLET, FILM COATED ORAL at 17:42

## 2022-01-20 NOTE — CONSULT NOTE ADULT - ATTENDING COMMENTS
atypical chest pain.    ruled out for MI.  reassuring CTA for PE, stress test and echo.  low likelihood of interval death, ACS or rehospitalization.  OK for discharge from CDU.  plans to followup in the [facility] system (with her PMD ) to keep all of her health information on one health records system.

## 2022-01-20 NOTE — ED CDU PROVIDER SUBSEQUENT DAY NOTE - HISTORY
55 y/o female with a hx of hypothyroidism PE s/p abdominoplasty on 8/2021 on Eliquis presents to the ER c/o near syncope, palpitations and chest pain  x 1 day.  Pt placed in CDU for cardiac monitoring, echo, Tele doc.    In interim- Patient resting comfortably. Vitals stable. No acute events overnight. Pt pending Echo in Am.

## 2022-01-20 NOTE — ED CDU PROVIDER SUBSEQUENT DAY NOTE - ATTENDING CONTRIBUTION TO CARE
: 55 yo female with hypothyroidism, depression, PE (provoked by abdominoplasty) on Eliquis, in ED with palpitations and near syncope and chest pain for 1 day.  CTA negative in ED, labs reassuring.  Pt placed in CDU for echo and monitoring, as well as stress test.  Pt with no acute complaints at time of my eval.  On exam pt well appearing, in NAD, heart RRR, lungs CTAB, abd NTND, extremities without swelling, strength 5/5 in all extremities and skin without rash. : 57 yo female with hypothyroidism, depression, PE (provoked by abdominoplasty) on Eliquis, in ED with palpitations and near syncope and chest pain for 1 day.  CTA negative in ED, labs reassuring.  Pt placed in CDU for echo and monitoring, as well as stress test.  Pt with no acute complaints at time of my eval.  On exam pt well appearing, in NAD, heart RRR, lungs CTAB, abd NTND, extremities without swelling, strength 5/5 in all extremities and skin without rash

## 2022-01-20 NOTE — ED CDU PROVIDER DISPOSITION NOTE - ATTENDING CONTRIBUTION TO CARE
: 55 yo female with hypothyroidism, depression, PE (provoked by abdominoplasty) on Eliquis, in ED with palpitations and near syncope and chest pain for 1 day.  CTA negative in ED, labs reassuring.  Pt placed in CDU for echo and monitoring, as well as stress test.  Stress and echo in CDU without acute findings.  Pt with no acute complaints at time of my eval.  On exam pt well appearing, in NAD, heart RRR, lungs CTAB, abd NTND, extremities without swelling, strength 5/5 in all extremities and skin without rash.  Pt stable for discharge with outpatient follow up.

## 2022-01-20 NOTE — ED CDU PROVIDER DISPOSITION NOTE - CLINICAL COURSE
57 y/o female with a hx of hypothyroidism PE s/p abdominoplasty on 8/2021 on Eliquis presents to the ER c/o near syncope, palpitations and chest pain  x 1 day.  Pt placed in CDU for cardiac monitoring, echo, Tele doc. Tele doc recommended stress test. Stress and echo wnl. Will dc w/ outpatient cardiology and PMD follow up. TSH 10, pt has close follow up and recently had her Synthroid doses adjustment.

## 2022-01-20 NOTE — ED CDU PROVIDER SUBSEQUENT DAY NOTE - PROGRESS NOTE DETAILS
Pt reassessed, no complaints of chest pain today. VSS. No events on tele. Patient Pt reassessed, no complaints of chest pain today. VSS. No events on tele. Echo and stress test performed, wnl. Seen by tele doc, will dc w/ cardiology follow up.

## 2022-01-20 NOTE — ED CDU PROVIDER SUBSEQUENT DAY NOTE - MEDICAL DECISION MAKING DETAILS
57 y/o female with a hx of hypothyroidism PE s/p abdominoplasty on 8/2021 on Eliquis presents to the ER c/o near syncope, palpitations and chest pain that occurred this AM.  Pt placed in CDU for cardiac monitoring, echo, Tele doc.    In interim- Patient resting comfortably. Vitals stable. No acute events overnight. Pt pending Echo in Am.

## 2022-01-20 NOTE — ED CDU PROVIDER DISPOSITION NOTE - NSFOLLOWUPINSTRUCTIONS_ED_ALL_ED_FT
Follow up with your primary care doctor and cardiologist within 48-72 hours. Show copies of your reports given to you. Recommend Aspirin 81mg over the counter daily until further evaluation.  Take all of your other medications as previously prescribed. Worsening or continued chest pain, shortness of breath, weakness,  or any other concerns return to ED.

## 2022-01-20 NOTE — CONSULT NOTE ADULT - SUBJECTIVE AND OBJECTIVE BOX
HISTORY OF PRESENT ILLNESS: HPI:    56 year old female with PMH anxiety, depression, endometriosis, gastritis, and hypothyroidism, who had a PE 2021 post-op, remains on Eliquis, s/p SHANA, abdominoplasty and breast sx 2021, now presents after an episode of near syncope with associated chest pain and palpitations.  She reports this episode was same sx as prior PE so she came to Er for eval.  Denies LOC.  Denies prior cardiac work up or hx CAD. Pt received her Moderna covid booster 3 days ago in left arm. Pt states she has had right arm pain that is diffuse x 2 weeks.      PAST MEDICAL & SURGICAL HISTORY:  Hypothyroidism    Endometriosis    Uterine polyp    Anxiety and depression    IBS (irritable bowel syndrome)    H/O gastritis    History of diverticulitis    Kidney stones    History of umbilical hernia    Uterine fibroid    S/P laparoscopic cholecystectomy    S/P laparoscopic surgery  for endometriosis    S/P LASIK (laser assisted in situ keratomileusis) of both eyes    S/P dilatation and curettage  x 1 - missed     MEDICATIONS  (STANDING):  apixaban 5 milliGRAM(s) Oral every 12 hours  levothyroxine 112 MICROGram(s) Oral daily      Allergies  No Known Allergies    FAMILY HISTORY:  FH: pulmonary embolism (Uncle)  Noncontributory for premature coronary disease or sudden cardiac death    SOCIAL HISTORY:    [ x] Non-smoker  [ ] Smoker  [ ] Alcohol    FLU VACCINE THIS YEAR STARTS IN AUGUST:  [ ] Yes    [ ] No    IF OVER 65 HAVE YOU EVER HAD A PNA VACCINE:  [ ] Yes    [ ] No       [ ] N/A      REVIEW OF SYSTEMS:  [x ]chest pain  [  ]shortness of breath  [  ]palpitations  [  ]syncope  [ ]near syncope [ ]upper extremity weakness   [ ] lower extremity weakness  [  ]diplopia  [  ]altered mental status   [  ]fevers  [ ]chills [ ]nausea  [ ]vomiting  [  ]dysphagia    [ ]abdominal pain  [ ]melena  [ ]BRBPR    [  ]epistaxis  [  ]rash    [ ]lower extremity edema        [ x] All others negative	  [ ] Unable to obtain      LABS:	 	    trop t <6                        14.1   2.98  )-----------( 334      ( 2022 13:47 )             42.7     139  |  99  |  12  ----------------------------<  75  3.9   |  27  |  0.72    Ca    9.4      2022 13:47    TPro  7.9  /  Alb  4.4  /  TBili  <0.2  /  DBili  x   /  AST  21  /  ALT  20  /  AlkPhos  91      Creatinine Trend: 0.72<--    PHYSICAL EXAM:  T(C): 36.7 (22 @ 13:02), Max: 37.1 (22 @ 20:25)  HR: 105 (22 @ 13:02) (90 - 109)  BP: 126/78 (22 @ 13:02) (113/71 - 127/84)  RR: 16 (22 @ 13:02) (16 - 21)  SpO2: 100% (22 @ 13:02) (98% - 100%)    Gen: Appears well in NAD  HEENT:  (-)icterus (-)pallor  CV: N S1 S2 1/6 LEDA (+)2 Pulses B/l  Resp:  Clear to ausculatation B/L, normal effort  GI: (+) BS Soft, NT, ND  Lymph:  (-)Edema, (-)obvious lymphadenopathy  Skin: Warm to touch, Normal turgor  Psych: Appropriate mood and affect    TELEMETRY: 	SR  / ST    ECG:  	NSR    < from: Transthoracic Echocardiogram (22 @ 06:08) >  CONCLUSIONS:  1. Normal mitral valve. Minimal mitral regurgitation.  2. Normal left ventricular internal dimensions and wall  thicknesses.  3. Normal left ventricular systolic function. No segmental  wall motion abnormalities.  4. Normal right ventricular size and function.  ------------------------------------------------------------------------  Confirmed on  2022 - 08:48:21 by Kaiden Reilly M.D.,  [facility], [facility]    < end of copied text >    < from: CT Angio Chest PE Protocol w/ IV Cont (22 @ 14:46) >  IMPRESSION:    No pulmonary embolism.    Clear lungs.    < end of copied text >      NST pending    ASSESSMENT/PLAN: 	56 year old female with PMH anxiety, depression, endometriosis, gastritis, and hypothyroidism, who had a PE 2021 post-op, remains on Eliquis, s/p SHANA, abdominoplasty and breast sx 2021, now presents after an episode of near syncope with associated chest pain and palpitations.    --ACs ruled out  --CTPA ruled out PE  --await NST results  --Pt wishes to follow up with her PMD and see a cardiologist in that office   if NST MICK ATKINS planning

## 2022-01-20 NOTE — ED CDU PROVIDER DISPOSITION NOTE - PATIENT PORTAL LINK FT
You can access the FollowMyHealth Patient Portal offered by [facility] by registering at the following website: [email or web address]. By joining [facility]’s FollowMyHealth portal, you will also be able to view your health information using other applications (apps) compatible with our system.

## 2022-02-14 RX ORDER — APIXABAN 5 MG/1
5 TABLET, FILM COATED ORAL
Qty: 60 | Refills: 0 | Status: ACTIVE | COMMUNITY
Start: 2021-10-05 | End: 1900-01-01

## 2022-02-28 ENCOUNTER — OUTPATIENT (OUTPATIENT)
Dept: OUTPATIENT SERVICES | Facility: HOSPITAL | Age: 57
LOS: 1 days | Discharge: HOME | End: 2022-02-28

## 2022-02-28 DIAGNOSIS — Z98.890 OTHER SPECIFIED POSTPROCEDURAL STATES: Chronic | ICD-10-CM

## 2022-02-28 DIAGNOSIS — I26.99 OTHER PULMONARY EMBOLISM WITHOUT ACUTE COR PULMONALE: ICD-10-CM

## 2022-02-28 DIAGNOSIS — Z90.49 ACQUIRED ABSENCE OF OTHER SPECIFIED PARTS OF DIGESTIVE TRACT: Chronic | ICD-10-CM

## 2022-03-02 ENCOUNTER — APPOINTMENT (OUTPATIENT)
Dept: HEMATOLOGY ONCOLOGY | Facility: CLINIC | Age: 57
End: 2022-03-02
Payer: COMMERCIAL

## 2022-03-02 VITALS
RESPIRATION RATE: 14 BRPM | WEIGHT: 154.96 LBS | HEART RATE: 101 BPM | SYSTOLIC BLOOD PRESSURE: 120 MMHG | DIASTOLIC BLOOD PRESSURE: 85 MMHG | BODY MASS INDEX: 28.16 KG/M2 | TEMPERATURE: 98.3 F | OXYGEN SATURATION: 99 % | HEIGHT: 62.24 IN

## 2022-03-02 DIAGNOSIS — Z71.89 OTHER SPECIFIED COUNSELING: ICD-10-CM

## 2022-03-02 DIAGNOSIS — I26.99 OTHER PULMONARY EMBOLISM W/OUT ACUTE COR PULMONALE: ICD-10-CM

## 2022-03-02 PROCEDURE — 99214 OFFICE O/P EST MOD 30 MIN: CPT

## 2022-03-03 PROBLEM — I26.99 PULMONARY EMBOLISM ON RIGHT: Status: ACTIVE | Noted: 2021-09-20

## 2022-03-03 PROBLEM — Z71.89 ENCOUNTER FOR ANTICOAGULATION DISCUSSION AND COUNSELING: Status: ACTIVE | Noted: 2021-11-10

## 2022-03-03 NOTE — HISTORY OF PRESENT ILLNESS
[Disease:__________________________] : Disease: [unfilled] [de-identified] : 56F with PMHx of Hashimotos-related hypothyroidism, Hx of laparoscopic cholecystectomy, elevated BMI who is s/p abdominoplasty/mastopexy on 8/31/21, complicated by DVT, PE. She was admitted to [facility] on 9/2 after a syncopal event where the DVT/PE was diagnosed. She was treated with heparin and transitioned to Eliquis and discharged on 9/3/21. She experienced another syncopal event on 9/4 and was brought to [facility] by ambulance for evaluation, found to have an abdominal wall hematoma, was transferred to [facility] for management of the hematoma (where she had the initial surgery). Now s/p hematoma evacuation on 9/5. She was discharged from 9/8.\par \par Her PE was thought to have been provoked in setting of surgery. Due to risk of worsening PE outweighed the risk of bleeding so she was discharged on lovenox 1 mg/kg subq twice daily. \par \par She presents today with her  [place]. She reports that she bruises easily. She had one miscarriage at 14 weeks. No history of blood clots. She denies any symptoms related to the PE at this time such as shortness of breath, chest pain, issues with exertion. She has not had any recent viral illnesses, no constitutional sx.\par \par She had a hysterectomy in January 2021 and still has her ovaries. She uses estrogen cream and a low dose of testosterone.\par \par Social Hx:\par , has 3 children\par Never smoker\par Occasional alcohol use\par No illicit drug use\par \par Ob/Gyn Surgeon: Dr. Linda Bernard [phone]\par PMD: Dr. Anaya Jasmine @ [facility] [FreeTextEntry1] : Lovenox followed by Apixaban for 6 months total duration [de-identified] : 9/20:21: Initial Visit.\par \par 11/10/21: Ms Bonilla presents today for followup for and acute PE in Sept 2021 PE on therapy (currently on apixaban). Today she is feeling well, but complaints that she has yet to return to her pre-surgical activity levels. She also has issues with sleeping and had been getting some relief previously with hormone therapy. No cough, no SOB at rest or on exertion, no claudication complaints, no abdominal complaints besides tightness around surgical scar. No swelling in her legs, however she still feels areas of scarring where she injected lovenox previously. She is tolerating apixaban well, but does bruise easily. no other bleeding.\par \par 3/2/22: Ms Restrepo is feeling well, no complaints. No cough, no SOB at rest or on exertion, no claudication complaints, no abdominal complaints, no n/v/d. She tolerated apixaban well without bleeding issues. She has not experienced any extremity swelling.\par \par -----\par A comprehensive review of systems was performed including constitutional, eyes, ENT, cardiovascular, respiratory, gastrointestinal, genitourinary, musculoskeletal, integumentary, neurological, psychiatric and hematologic / lymphatic. All pertinent positives are included in the H&P under interval history above and the remaining review of systems listed are negative.
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
## 2022-03-03 NOTE — PHYSICAL EXAM
[Fully active, able to carry on all pre-disease performance without restriction] : Status 0 - Fully active, able to carry on all pre-disease performance without restriction [Normal] : affect appropriate [de-identified] : Surgical scar across the abdomen below the umbilicus, healing, has some erythema [de-identified] : RIght supraclavicular 1 cm fullness (unchanged since 9/2021), no inguinal adenopathy present at follow-up

## 2022-05-09 NOTE — H&P ADULT - DOES THIS PATIENT HAVE A HISTORY OF OR HAS BEEN DX WITH HEART FAILURE?
Daily Note     Today's date: 2022  Patient name: Srinath Arana  :   MRN: [id number]  Referring provider: Maritza Gonsalez DO  Dx:   Encounter Diagnosis     ICD-10-CM    1  Left hip pain  M25 552               Subjective: Patient reports left sided thigh pain entering treatment today with numbness present  Reports pain with each step she takes  Objective: See treatment diary below    Assessment: Tolerated treatment fair  Patient reported discomfort with standing RLE hip abduction  Patient reported feeling slightly improved post session  Plan: Continue per plan of care  Insurance:  AMA/CMS Eval/ Re-eval POC expires Azam Kami #/ Referral # Total    Start date  Expiration date Extension  Visit limitation? PT only or  PT+OT? Co-Insurance   CMS 22 7 28                                                                         AUTH #:  Date               Authed: Used                Remaining                    Precautions: HTN, OP    Manuals 4 27 5 5 5 9      visit  1- post op 2      Soft tissue quad   FB                        Neuro Re-Ed                                                                        Ther Ex         HEP 15' 10'       Pt edu FB        LAQ  10x 2*10      Quad set  10x       Supine hip abd heel slide  10x       Heel slide  10x       glute set  10x 3*10      Nu step st 6   5', lvl 1      Stand hip abd    2*10 LLE moving, 1*10 RLE moving      Stand hip extn   2*10      Calf raise   2*10                                 Ther Activity                           Gait Training                           Modalities                             Short Term: from Date of surgery  1  Pt will report decreased levels of pain by at least 2 subjective ratings in 4 weeks  2  Pt will demonstrate improved ROM by at least 10 degrees in 4 weeks  3  Pt will demonstrate improved strength by 1/2 grade in 4 weeks  4  Pt will be able to ambulate without AD in 4 weeks  Long Term:   1   Pt will be independent in their HEP in 8 weeks  2  Pt will be pain free with IADL's in 8 weeks  3  Pt will be able to perform reciprocal stair navigation in 8 weeks  4  Pt will return to PLOF in 10 weeks with regards to recreational activities  no

## 2022-09-06 ENCOUNTER — OFFICE (OUTPATIENT)
Dept: URBAN - METROPOLITAN AREA CLINIC 35 | Facility: CLINIC | Age: 57
Setting detail: OPHTHALMOLOGY
End: 2022-09-06
Payer: COMMERCIAL

## 2022-09-06 ENCOUNTER — RX ONLY (RX ONLY)
Age: 57
End: 2022-09-06

## 2022-09-06 DIAGNOSIS — H00.15: ICD-10-CM

## 2022-09-06 PROCEDURE — 92002 INTRM OPH EXAM NEW PATIENT: CPT | Performed by: OPHTHALMOLOGY

## 2022-09-06 ASSESSMENT — CONFRONTATIONAL VISUAL FIELD TEST (CVF)
OS_FINDINGS: FULL
OD_FINDINGS: FULL

## 2022-09-06 ASSESSMENT — VISUAL ACUITY
OS_BCVA: 20/30-
OD_BCVA: 20/40-2

## 2022-09-06 ASSESSMENT — LID EXAM ASSESSMENTS: OS_EDEMA: LLL 2+

## 2022-09-06 ASSESSMENT — TONOMETRY: OS_IOP_MMHG: 14

## 2022-09-16 NOTE — PATIENT PROFILE ADULT - HOME ACCESSIBILITY CONCERNS
[facility]  [address] [facility] [address]  Phone: [phone]   September 16, 2022    Patient: Ignacio Chaudhary   YOB: 1986   Date of Visit: 9/16/2022       To Whom It May Concern:    Ignacio Chaudhary was seen and treated in our emergency department on 9/16/2022. He may return to work on 9/27/2022.  Needs to follow-up with orthopedic surgeon regarding foot fracture    If you have any questions or concerns, please don't hesitate to call.    Sincerely,         Carson Hill PA-C     
none
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
## 2023-03-21 ENCOUNTER — OUTPATIENT (OUTPATIENT)
Dept: OUTPATIENT SERVICES | Facility: HOSPITAL | Age: 58
LOS: 1 days | End: 2023-03-21
Payer: COMMERCIAL

## 2023-03-21 ENCOUNTER — APPOINTMENT (OUTPATIENT)
Dept: RADIOLOGY | Facility: IMAGING CENTER | Age: 58
End: 2023-03-21
Payer: COMMERCIAL

## 2023-03-21 DIAGNOSIS — Z98.890 OTHER SPECIFIED POSTPROCEDURAL STATES: Chronic | ICD-10-CM

## 2023-03-21 DIAGNOSIS — Z13.820 ENCOUNTER FOR SCREENING FOR OSTEOPOROSIS: ICD-10-CM

## 2023-03-21 DIAGNOSIS — Z90.49 ACQUIRED ABSENCE OF OTHER SPECIFIED PARTS OF DIGESTIVE TRACT: Chronic | ICD-10-CM

## 2023-03-21 PROCEDURE — 77080 DXA BONE DENSITY AXIAL: CPT | Mod: 26

## 2023-03-21 PROCEDURE — 77080 DXA BONE DENSITY AXIAL: CPT

## 2023-03-24 ENCOUNTER — APPOINTMENT (OUTPATIENT)
Dept: MAMMOGRAPHY | Facility: CLINIC | Age: 58
End: 2023-03-24

## 2023-03-24 ENCOUNTER — APPOINTMENT (OUTPATIENT)
Dept: ULTRASOUND IMAGING | Facility: CLINIC | Age: 58
End: 2023-03-24

## 2023-03-30 ENCOUNTER — EMERGENCY (EMERGENCY)
Facility: HOSPITAL | Age: 58
LOS: 1 days | Discharge: HOME | End: 2023-03-30
Attending: EMERGENCY MEDICINE | Admitting: EMERGENCY MEDICINE
Payer: COMMERCIAL

## 2023-03-30 ENCOUNTER — APPOINTMENT (OUTPATIENT)
Dept: MAMMOGRAPHY | Facility: IMAGING CENTER | Age: 58
End: 2023-03-30

## 2023-03-30 ENCOUNTER — APPOINTMENT (OUTPATIENT)
Dept: ULTRASOUND IMAGING | Facility: IMAGING CENTER | Age: 58
End: 2023-03-30

## 2023-03-30 VITALS
OXYGEN SATURATION: 100 % | TEMPERATURE: 99 F | HEART RATE: 109 BPM | DIASTOLIC BLOOD PRESSURE: 86 MMHG | SYSTOLIC BLOOD PRESSURE: 127 MMHG | RESPIRATION RATE: 16 BRPM

## 2023-03-30 DIAGNOSIS — Z98.890 OTHER SPECIFIED POSTPROCEDURAL STATES: Chronic | ICD-10-CM

## 2023-03-30 DIAGNOSIS — Z90.49 ACQUIRED ABSENCE OF OTHER SPECIFIED PARTS OF DIGESTIVE TRACT: Chronic | ICD-10-CM

## 2023-03-30 PROCEDURE — 74177 CT ABD & PELVIS W/CONTRAST: CPT | Mod: 26

## 2023-03-30 PROCEDURE — 99285 EMERGENCY DEPT VISIT HI MDM: CPT

## 2023-03-30 PROCEDURE — 99284 EMERGENCY DEPT VISIT MOD MDM: CPT

## 2023-03-30 RX ORDER — CIPROFLOXACIN LACTATE 400MG/40ML
1 VIAL (ML) INTRAVENOUS
Qty: 14 | Refills: 0
Start: 2023-03-30 | End: 2023-04-05

## 2023-03-30 RX ORDER — METRONIDAZOLE 500 MG
1 TABLET ORAL
Qty: 21 | Refills: 0
Start: 2023-03-30 | End: 2023-04-05

## 2023-03-30 RX ORDER — ACETAMINOPHEN 500 MG
975 TABLET ORAL ONCE
Refills: 0 | Status: COMPLETED | OUTPATIENT
Start: 2023-03-30 | End: 2023-03-30

## 2023-03-30 RX ADMIN — Medication 975 MILLIGRAM(S): at 18:45

## 2023-03-30 RX ADMIN — Medication 975 MILLIGRAM(S): at 18:02

## 2023-03-30 NOTE — ED PROVIDER NOTE - NSFOLLOWUPINSTRUCTIONS_ED_ALL_ED_FT
Diarrhea    Diarrhea is frequent loose or watery bowel movements that has many causes. Diarrhea can make you feel weak and cause you to become dehydrated. Diarrhea typically lasts 2–3 days, but can last longer if it is a sign of something more serious. Drink clear fluids to prevent dehydration. Eat bland, easy-to-digest foods as tolerated.     SEEK IMMEDIATE MEDICAL CARE IF YOU HAVE ANY OF THE FOLLOWING SYMPTOMS: high fevers, lightheadedness/dizziness, chest pain, black or bloody stools, shortness of breath, severe abdominal or back pain, or any signs of dehydration.    Please follow up with your primary medical doctor within two days.  Return to the emergency department if you feel that your condition is worsening    You can take Tylenol for pain. Please follow the instructions on the bottles.    Diverticulitis / Colitis     Diverticulitis is inflammation or infection of small pouches in your colon that form when you HAVE a condition called diverticulosis. This condition can range from mild to severe potentially leading to perforation or obstructions of your colon. Symptoms include abdominal pain, fever/chills, nausea, vomiting, diarrhea, constipation, or blood in your stool. If you were prescribed an antibiotic medicine, take it as told by your health care provider. Do not stop taking the antibiotic even if you start to feel better.    SEEK IMMEDIATE MEDICAL CARE IF YOU HAVE ANY OF THE FOLLOWING SYMPTOMS: worsening abdominal pain, high fever, inability to hold down liquids or medication, black or bloody stools, inability to pass gas, lightheadedness/dizziness, or a change in mental status.

## 2023-03-30 NOTE — ED PROVIDER NOTE - PROGRESS NOTE DETAILS
This chart was completed on paper urgently.  Patient was reassessed after sunrise was reevaluated.  Patient still endorsing pain.  States she had 1 bloody bowel movement prior to coming to the ED and 1 while being in the ED.  Still pending CT read.  This patient was evaluated early in the morning in the ED for having nausea and vomiting, initially discharged and then followed back up with 2 episodes of bloody diarrhea.  Will give Tylenol for pain control.  Disposition pending Resident: Yonatan Suero, PGY1 – Pt was re-evaluated at bedside, VSS, feeling better overall. Results were discussed with patient as well as return precautions and follow up plan with PCP and/or specialist. Time was taken to answer any questions that the patient had before providing them with discharge paperwork. Patient to f/u with GI doc. Discussed sending abx to cover for infectious colitis. Discussed undifferentiated CT results of infectious vs. inflammatory causes. patient agrees with plan - passed PO challenge.

## 2023-03-30 NOTE — ED POST DISCHARGE NOTE - ADDITIONAL DOCUMENTATION
Patient called stating was recently discharged for CC of nausea/vomiting. Once home had episode of BRBPR which she stated "was a lot and mucous-like." Pt states now feeling very fatigued. Directed patient to return to ED immediately for re-evaluation.

## 2023-03-30 NOTE — ED PROVIDER NOTE - PATIENT PORTAL LINK FT
You can access the FollowMyHealth Patient Portal offered by [facility] by registering at the following website: [email or web address]. By joining [facility]’s FollowMyHealth portal, you will also be able to view your health information using other applications (apps) compatible with our system.

## 2023-03-30 NOTE — ED ADULT TRIAGE NOTE - NS ED NURSE BANDS TYPE
Labor Epidural      Patient reassessed immediately prior to procedure    Patient location during procedure: OB  Performed By  Anesthesiologist: YONATHAN ALBERTO  Preanesthetic Checklist  Completed: patient identified, site marked, surgical consent, pre-op evaluation, timeout performed, IV checked, risks and benefits discussed and monitors and equipment checked  Prep:  Pt Position:sitting  Sterile Tech:gloves, mask, sterile barrier and cap  Prep:chlorhexidine gluconate and isopropyl alcohol  Monitoring:blood pressure monitoring and continuous pulse oximetry  Epidural Block Procedure:  Approach:midline  Guidance:landmark technique and palpation technique  Location:L3-L4  Needle Type:Tuohy  Needle Gauge:17 G  Loss of Resistance Medium: air  Loss of Resistance: 5cm  Cath Depth at skin:10 cm  Paresthesia: none  Aspiration:negative  Test Dose:negative  Number of Attempts: 1  Post Assessment:  Dressing:secured with tape and occlusive dressing applied (Tegaderm Placed)  Pt Tolerance:patient tolerated the procedure well with no apparent complications  Complications:no             Name band;

## 2023-03-30 NOTE — ED ADULT TRIAGE NOTE - PAIN: PRESENCE, MLM
complains of pain/discomfort Xerosis Normal Treatment: I recommended application of Cetaphil or CeraVe numerous times a day and before going to bed to all dry areas.

## 2023-06-01 NOTE — PRE-ANESTHESIA EVALUATION ADULT - HEIGHT IN INCHES
For information on Fall & Injury Prevention, visit: [email or web address] OR  [email or web address] OR  https://www.cdc.gov/steadi/patient.html
3

## 2023-10-01 PROBLEM — M54.16 LUMBAR RADICULAR PAIN: Status: ACTIVE | Noted: 2019-10-03

## 2024-06-02 ENCOUNTER — INPATIENT (INPATIENT)
Facility: HOSPITAL | Age: 59
LOS: 3 days | Discharge: HOME | DRG: 392 | End: 2024-06-06
Attending: STUDENT IN AN ORGANIZED HEALTH CARE EDUCATION/TRAINING PROGRAM | Admitting: STUDENT IN AN ORGANIZED HEALTH CARE EDUCATION/TRAINING PROGRAM
Payer: COMMERCIAL

## 2024-06-02 VITALS
DIASTOLIC BLOOD PRESSURE: 65 MMHG | HEART RATE: 63 BPM | OXYGEN SATURATION: 100 % | SYSTOLIC BLOOD PRESSURE: 92 MMHG | RESPIRATION RATE: 19 BRPM | TEMPERATURE: 98 F | WEIGHT: 141.98 LBS | HEIGHT: 63 IN

## 2024-06-02 DIAGNOSIS — Z90.49 ACQUIRED ABSENCE OF OTHER SPECIFIED PARTS OF DIGESTIVE TRACT: Chronic | ICD-10-CM

## 2024-06-02 DIAGNOSIS — Z98.890 OTHER SPECIFIED POSTPROCEDURAL STATES: Chronic | ICD-10-CM

## 2024-06-02 DIAGNOSIS — K57.93 DIVERTICULITIS OF INTESTINE, PART UNSPECIFIED, WITHOUT PERFORATION OR ABSCESS WITH BLEEDING: ICD-10-CM

## 2024-06-02 LAB
ALBUMIN SERPL ELPH-MCNC: 3.9 G/DL — SIGNIFICANT CHANGE UP (ref 3.3–5)
ALP SERPL-CCNC: 86 U/L — SIGNIFICANT CHANGE UP (ref 40–120)
ALT FLD-CCNC: 18 U/L — SIGNIFICANT CHANGE UP (ref 10–45)
ANION GAP SERPL CALC-SCNC: 12 MMOL/L — SIGNIFICANT CHANGE UP (ref 5–17)
APPEARANCE UR: CLEAR — SIGNIFICANT CHANGE UP
AST SERPL-CCNC: 21 U/L — SIGNIFICANT CHANGE UP (ref 10–40)
BACTERIA # UR AUTO: NEGATIVE /HPF — SIGNIFICANT CHANGE UP
BASE EXCESS BLDV CALC-SCNC: 5.9 MMOL/L — HIGH (ref -2–3)
BASOPHILS # BLD AUTO: 0.02 K/UL — SIGNIFICANT CHANGE UP (ref 0–0.2)
BASOPHILS NFR BLD AUTO: 0.3 % — SIGNIFICANT CHANGE UP (ref 0–2)
BILIRUB SERPL-MCNC: 0.4 MG/DL — SIGNIFICANT CHANGE UP (ref 0.2–1.2)
BILIRUB UR-MCNC: NEGATIVE — SIGNIFICANT CHANGE UP
BUN SERPL-MCNC: 13 MG/DL — SIGNIFICANT CHANGE UP (ref 7–23)
CA-I SERPL-SCNC: 1.22 MMOL/L — SIGNIFICANT CHANGE UP (ref 1.15–1.33)
CALCIUM SERPL-MCNC: 9.2 MG/DL — SIGNIFICANT CHANGE UP (ref 8.4–10.5)
CAST: 1 /LPF — SIGNIFICANT CHANGE UP (ref 0–4)
CHLORIDE BLDV-SCNC: 100 MMOL/L — SIGNIFICANT CHANGE UP (ref 96–108)
CHLORIDE SERPL-SCNC: 101 MMOL/L — SIGNIFICANT CHANGE UP (ref 96–108)
CO2 BLDV-SCNC: 34 MMOL/L — HIGH (ref 22–26)
CO2 SERPL-SCNC: 25 MMOL/L — SIGNIFICANT CHANGE UP (ref 22–31)
COLOR SPEC: YELLOW — SIGNIFICANT CHANGE UP
CREAT SERPL-MCNC: 0.77 MG/DL — SIGNIFICANT CHANGE UP (ref 0.5–1.3)
DIFF PNL FLD: NEGATIVE — SIGNIFICANT CHANGE UP
EGFR: 90 ML/MIN/1.73M2 — SIGNIFICANT CHANGE UP
EOSINOPHIL # BLD AUTO: 0.03 K/UL — SIGNIFICANT CHANGE UP (ref 0–0.5)
EOSINOPHIL NFR BLD AUTO: 0.4 % — SIGNIFICANT CHANGE UP (ref 0–6)
GAS PNL BLDV: 133 MMOL/L — LOW (ref 136–145)
GAS PNL BLDV: SIGNIFICANT CHANGE UP
GLUCOSE BLDV-MCNC: 85 MG/DL — SIGNIFICANT CHANGE UP (ref 70–99)
GLUCOSE SERPL-MCNC: 87 MG/DL — SIGNIFICANT CHANGE UP (ref 70–99)
GLUCOSE UR QL: NEGATIVE MG/DL — SIGNIFICANT CHANGE UP
HCO3 BLDV-SCNC: 32 MMOL/L — HIGH (ref 22–29)
HCT VFR BLD CALC: 36.5 % — SIGNIFICANT CHANGE UP (ref 34.5–45)
HCT VFR BLDA CALC: 39 % — SIGNIFICANT CHANGE UP (ref 34.5–46.5)
HGB BLD CALC-MCNC: 12.9 G/DL — SIGNIFICANT CHANGE UP (ref 11.7–16.1)
HGB BLD-MCNC: 12.2 G/DL — SIGNIFICANT CHANGE UP (ref 11.5–15.5)
IMM GRANULOCYTES NFR BLD AUTO: 0.1 % — SIGNIFICANT CHANGE UP (ref 0–0.9)
KETONES UR-MCNC: NEGATIVE MG/DL — SIGNIFICANT CHANGE UP
LACTATE BLDV-MCNC: 0.7 MMOL/L — SIGNIFICANT CHANGE UP (ref 0.5–2)
LEUKOCYTE ESTERASE UR-ACNC: NEGATIVE — SIGNIFICANT CHANGE UP
LIDOCAIN IGE QN: 12 U/L — SIGNIFICANT CHANGE UP (ref 7–60)
LYMPHOCYTES # BLD AUTO: 0.75 K/UL — LOW (ref 1–3.3)
LYMPHOCYTES # BLD AUTO: 11.1 % — LOW (ref 13–44)
MCHC RBC-ENTMCNC: 29.8 PG — SIGNIFICANT CHANGE UP (ref 27–34)
MCHC RBC-ENTMCNC: 33.4 GM/DL — SIGNIFICANT CHANGE UP (ref 32–36)
MCV RBC AUTO: 89 FL — SIGNIFICANT CHANGE UP (ref 80–100)
MONOCYTES # BLD AUTO: 0.67 K/UL — SIGNIFICANT CHANGE UP (ref 0–0.9)
MONOCYTES NFR BLD AUTO: 9.9 % — SIGNIFICANT CHANGE UP (ref 2–14)
NEUTROPHILS # BLD AUTO: 5.28 K/UL — SIGNIFICANT CHANGE UP (ref 1.8–7.4)
NEUTROPHILS NFR BLD AUTO: 78.2 % — HIGH (ref 43–77)
NITRITE UR-MCNC: NEGATIVE — SIGNIFICANT CHANGE UP
NRBC # BLD: 0 /100 WBCS — SIGNIFICANT CHANGE UP (ref 0–0)
PCO2 BLDV: 55 MMHG — HIGH (ref 39–42)
PH BLDV: 7.38 — SIGNIFICANT CHANGE UP (ref 7.32–7.43)
PH UR: 6.5 — SIGNIFICANT CHANGE UP (ref 5–8)
PLATELET # BLD AUTO: 293 K/UL — SIGNIFICANT CHANGE UP (ref 150–400)
PO2 BLDV: 26 MMHG — SIGNIFICANT CHANGE UP (ref 25–45)
POTASSIUM BLDV-SCNC: 4.3 MMOL/L — SIGNIFICANT CHANGE UP (ref 3.5–5.1)
POTASSIUM SERPL-MCNC: 4.2 MMOL/L — SIGNIFICANT CHANGE UP (ref 3.5–5.3)
POTASSIUM SERPL-SCNC: 4.2 MMOL/L — SIGNIFICANT CHANGE UP (ref 3.5–5.3)
PROT SERPL-MCNC: 7.2 G/DL — SIGNIFICANT CHANGE UP (ref 6–8.3)
PROT UR-MCNC: NEGATIVE MG/DL — SIGNIFICANT CHANGE UP
RBC # BLD: 4.1 M/UL — SIGNIFICANT CHANGE UP (ref 3.8–5.2)
RBC # FLD: 12.6 % — SIGNIFICANT CHANGE UP (ref 10.3–14.5)
RBC CASTS # UR COMP ASSIST: 3 /HPF — SIGNIFICANT CHANGE UP (ref 0–4)
SAO2 % BLDV: 41.7 % — LOW (ref 67–88)
SODIUM SERPL-SCNC: 138 MMOL/L — SIGNIFICANT CHANGE UP (ref 135–145)
SP GR SPEC: >1.03 — HIGH (ref 1–1.03)
SQUAMOUS # UR AUTO: 2 /HPF — SIGNIFICANT CHANGE UP (ref 0–5)
UROBILINOGEN FLD QL: 0.2 MG/DL — SIGNIFICANT CHANGE UP (ref 0.2–1)
WBC # BLD: 6.76 K/UL — SIGNIFICANT CHANGE UP (ref 3.8–10.5)
WBC # FLD AUTO: 6.76 K/UL — SIGNIFICANT CHANGE UP (ref 3.8–10.5)
WBC UR QL: 1 /HPF — SIGNIFICANT CHANGE UP (ref 0–5)

## 2024-06-02 PROCEDURE — 99285 EMERGENCY DEPT VISIT HI MDM: CPT

## 2024-06-02 PROCEDURE — 74177 CT ABD & PELVIS W/CONTRAST: CPT | Mod: 26,MC

## 2024-06-02 RX ORDER — SODIUM CHLORIDE 9 MG/ML
1000 INJECTION INTRAMUSCULAR; INTRAVENOUS; SUBCUTANEOUS ONCE
Refills: 0 | Status: COMPLETED | OUTPATIENT
Start: 2024-06-02 | End: 2024-06-02

## 2024-06-02 RX ORDER — METRONIDAZOLE 500 MG
TABLET ORAL
Refills: 0 | Status: DISCONTINUED | OUTPATIENT
Start: 2024-06-02 | End: 2024-06-06

## 2024-06-02 RX ORDER — ACETAMINOPHEN 500 MG
1000 TABLET ORAL ONCE
Refills: 0 | Status: COMPLETED | OUTPATIENT
Start: 2024-06-02 | End: 2024-06-02

## 2024-06-02 RX ORDER — ONDANSETRON 8 MG/1
1 TABLET, FILM COATED ORAL
Qty: 0 | Refills: 0 | DISCHARGE

## 2024-06-02 RX ORDER — CIPROFLOXACIN LACTATE 400MG/40ML
400 VIAL (ML) INTRAVENOUS ONCE
Refills: 0 | Status: COMPLETED | OUTPATIENT
Start: 2024-06-02 | End: 2024-06-02

## 2024-06-02 RX ORDER — MORPHINE SULFATE 50 MG/1
2 CAPSULE, EXTENDED RELEASE ORAL ONCE
Refills: 0 | Status: DISCONTINUED | OUTPATIENT
Start: 2024-06-02 | End: 2024-06-02

## 2024-06-02 RX ORDER — METRONIDAZOLE 500 MG
500 TABLET ORAL ONCE
Refills: 0 | Status: COMPLETED | OUTPATIENT
Start: 2024-06-02 | End: 2024-06-02

## 2024-06-02 RX ORDER — KETOROLAC TROMETHAMINE 30 MG/ML
15 SYRINGE (ML) INJECTION ONCE
Refills: 0 | Status: DISCONTINUED | OUTPATIENT
Start: 2024-06-02 | End: 2024-06-02

## 2024-06-02 RX ORDER — SODIUM CHLORIDE 9 MG/ML
1000 INJECTION, SOLUTION INTRAVENOUS
Refills: 0 | Status: DISCONTINUED | OUTPATIENT
Start: 2024-06-02 | End: 2024-06-06

## 2024-06-02 RX ORDER — ZOLPIDEM TARTRATE 10 MG/1
1 TABLET ORAL
Qty: 0 | Refills: 0 | DISCHARGE

## 2024-06-02 RX ORDER — METOPROLOL TARTRATE 50 MG
5 TABLET ORAL EVERY 6 HOURS
Refills: 0 | Status: DISCONTINUED | OUTPATIENT
Start: 2024-06-02 | End: 2024-06-05

## 2024-06-02 RX ORDER — LEVOTHYROXINE SODIUM 125 MCG
90 TABLET ORAL AT BEDTIME
Refills: 0 | Status: DISCONTINUED | OUTPATIENT
Start: 2024-06-02 | End: 2024-06-05

## 2024-06-02 RX ORDER — METRONIDAZOLE 500 MG
500 TABLET ORAL EVERY 8 HOURS
Refills: 0 | Status: DISCONTINUED | OUTPATIENT
Start: 2024-06-02 | End: 2024-06-06

## 2024-06-02 RX ORDER — ENOXAPARIN SODIUM 100 MG/ML
40 INJECTION SUBCUTANEOUS EVERY 24 HOURS
Refills: 0 | Status: DISCONTINUED | OUTPATIENT
Start: 2024-06-02 | End: 2024-06-03

## 2024-06-02 RX ORDER — CIPROFLOXACIN LACTATE 400MG/40ML
400 VIAL (ML) INTRAVENOUS EVERY 12 HOURS
Refills: 0 | Status: DISCONTINUED | OUTPATIENT
Start: 2024-06-02 | End: 2024-06-06

## 2024-06-02 RX ADMIN — Medication 100 MILLIGRAM(S): at 13:45

## 2024-06-02 RX ADMIN — Medication 200 MILLIGRAM(S): at 12:38

## 2024-06-02 RX ADMIN — Medication 200 MILLIGRAM(S): at 23:41

## 2024-06-02 RX ADMIN — SODIUM CHLORIDE 100 MILLILITER(S): 9 INJECTION, SOLUTION INTRAVENOUS at 18:55

## 2024-06-02 RX ADMIN — SODIUM CHLORIDE 1000 MILLILITER(S): 9 INJECTION INTRAMUSCULAR; INTRAVENOUS; SUBCUTANEOUS at 11:48

## 2024-06-02 RX ADMIN — Medication 400 MILLIGRAM(S): at 11:46

## 2024-06-02 RX ADMIN — Medication 400 MILLIGRAM(S): at 19:24

## 2024-06-02 RX ADMIN — Medication 1000 MILLIGRAM(S): at 19:54

## 2024-06-02 RX ADMIN — Medication 100 MILLIGRAM(S): at 21:24

## 2024-06-02 RX ADMIN — Medication 15 MILLIGRAM(S): at 22:50

## 2024-06-02 RX ADMIN — Medication 15 MILLIGRAM(S): at 22:21

## 2024-06-02 RX ADMIN — Medication 15 MILLIGRAM(S): at 15:36

## 2024-06-02 RX ADMIN — Medication 100 MILLIGRAM(S): at 18:55

## 2024-06-02 NOTE — H&P ADULT - HISTORY OF PRESENT ILLNESS
COLORECTAL SURGERY CONSULT NOTE  --------------------------------------------------------------------------------------------    Patient is a 59y old  Female who presents with a chief complaint of abd pain    HPI: 59F with HTN and hypothyroidism with abd pain x1.5 days no nausea no vomiting, no fevers or chills, worst llq but goes from "hip to hip" one normal BM earlier today.  1 prior episode of diverticulitis 10y ago treated with PO abx no admission. Multiple prior gyn surgeries including hysterectomy, ventral hernia, mastopexy/abdominoplasty.  Last c-scope within 5 years and showed only diverticulosis per patient.  Former pharmaceutical rep.       ROS: 10-system review is otherwise negative except HPI above.      PAST MEDICAL & SURGICAL HISTORY:  Hypothyroidism      Endometriosis      Uterine polyp      Anxiety and depression      IBS (irritable bowel syndrome)      H/O gastritis      History of diverticulitis      Kidney stones      History of umbilical hernia      Uterine fibroid      S/P laparoscopic cholecystectomy      S/P laparoscopic surgery  for endometriosis      S/P LASIK (laser assisted in situ keratomileusis) of both eyes      S/P dilatation and curettage  x 1 - missed         FAMILY HISTORY:  FH: pulmonary embolism (Uncle)        SOCIAL HISTORY:      ALLERGIES: No Known Allergies      HOME MEDICATIONS:     CURRENT MEDICATIONS  MEDICATIONS (STANDING): ciprofloxacin   IVPB 400 milliGRAM(s) IV Intermittent every 12 hours  enoxaparin Injectable 40 milliGRAM(s) SubCutaneous every 24 hours  lactated ringers. 1000 milliLiter(s) IV Continuous <Continuous>  levothyroxine Injectable 90 MICROGram(s) IV Push at bedtime  metoprolol tartrate Injectable 5 milliGRAM(s) IV Push every 6 hours  metroNIDAZOLE  IVPB        MEDICATIONS (PRN):  --------------------------------------------------------------------------------------------    Vitals:   T(C): 36.9 (24 @ 15:27), Max: 37 (24 @ 13:21)  HR: 81 (24 @ 15:27) (63 - 81)  BP: 98/66 (24 @ 15:27) (92/65 - 99/67)  RR: 18 (24 @ 15:27) (17 - 19)  SpO2: 99% (24 @ 15:27) (99% - 100%)  CAPILLARY BLOOD GLUCOSE      Height (cm): 160 ( 10:59)  Weight (kg): 64.4 (06-02 @ 10:59)  BMI (kg/m2): 25.2 ( 10:59)  BSA (m2): 1.67 ( 10:59)    PHYSICAL EXAM:   General: NAD, Lying in bed comfortably  Neuro: A+Ox3  HEENT: NC/AT, EOMI  Neck: Soft, supple  Cardio: RRR  Resp: Good effort  GI/Abd: Soft, tender llq and rlq left worse   Musculoskeletal: All 4 extremities moving spontaneously, no limitations.  --------------------------------------------------------------------------------------------    LABS  CBC ( 11:53)                              12.2                           6.76    )----------------(  293        78.2<H>% Neutrophils, 11.1<L>% Lymphocytes, ANC: 5.28                                36.5      BMP ( @ 11:53)             138     |  101     |  13    		Ca++ --      Ca 9.2                ---------------------------------( 87    		Mg --                 4.2     |  25      |  0.77  			Ph --        LFTs (06-02 @ 11:53)      TPro 7.2 / Alb 3.9 / TBili 0.4 / DBili -- / AST 21 / ALT 18 / AlkPhos 86          VBG ( @ 11:50)     7.38 / 55<H> / 26 / 32<H> / 5.9<H> / 41.7<L>%     Lactate: 0.7    --------------------------------------------------------------------------------------------    MICROBIOLOGY  Urinalysis ( @ 14:02):     Color: Yellow / Appearance: Clear / SG: >1.030<H> / pH: 6.5 / Gluc: Negative / Ketones: Negative / Bili: Negative / Urobili: 0.2 / Protein :Negative / Nitrites: Negative / Leuk.Est: Negative / RBC: 3 / WBC: 1 / Sq Epi:  / Non Sq Epi: 2 / Bacteria Negative

## 2024-06-02 NOTE — ED ADULT NURSE NOTE - OBJECTIVE STATEMENT
60y/o female presents to ER from home w/ c/o abd pain. PMH diverticulitis, hernia repair. Pt states abd pain began about 1-2 days ago. Pain is located in lower abd, more prominent in LLQ. Did not take medication for pain. Denies N/V/D, SOB, CO, fever, chills, dizziness. A&Ox4 airway patent with spontaneous unlabored breathing, abd soft/ nondistended and nontender to palpation. Safety maintained bed in lowest position and locked.

## 2024-06-02 NOTE — ED ADULT TRIAGE NOTE - CHIEF COMPLAINT QUOTE
Age: 39d  LOS: 37d    Vital Signs:    T(C): 36.7 (02-03-24 @ 08:00), Max: 37 (02-03-24 @ 02:00)  HR: 158 (02-03-24 @ 08:00) (138 - 158)  BP: 74/43 (02-03-24 @ 08:00) (69/27 - 74/43)  RR: 44 (02-03-24 @ 08:00) (33 - 62)  SpO2: 100% (02-03-24 @ 08:00) (99% - 100%)    Medications:    ferrous sulfate Oral Liquid - Peds 4.4 milliGRAM(s) Elemental Iron <User Schedule>  multivitamin Oral Drops - Peds 1 milliLiter(s) daily  Timolol 0.5% GFS ophth soln 1 Drop(s) 1 Drop(s) every 12 hours      Labs:              N/A   N/A )---------( N/A   [01-29 @ 02:20]            27.1  S:N/A%  B:N/A% [place]:N/A% Myelo:N/A% Promyelo:N/A%  Blasts:N/A% Lymph:N/A% Mono:N/A% Eos:N/A% Baso:N/A% Retic:9.5%            N/A   N/A )---------( N/A   [01-22 @ 02:43]            25.7  S:N/A%  B:N/A% [place]:N/A% Myelo:N/A% Promyelo:N/A%  Blasts:N/A% Lymph:N/A% Mono:N/A% Eos:N/A% Baso:N/A% Retic:10.6%    N/A  |N/A  |13     --------------------(N/A     [01-22 @ 02:43]  N/A  |N/A  |N/A      Ca:9.3   Mg:N/A   Phos:5.5        Alkaline Phosphatase [01-22] - 547 Albumin [01-22] - 2.9    Ferritin [01-22] - 42     POCT Glucose:                             Lower  abdominal/ back pain, hematuria

## 2024-06-02 NOTE — ED ADULT NURSE REASSESSMENT NOTE - NS ED NURSE REASSESS COMMENT FT1
pt reminded about need for urine sample, PREETI Carlos states that it is okay to start ABX prior to urine sample being collected, pt resting comfortably, pending dispo

## 2024-06-02 NOTE — ED PROVIDER NOTE - ATTENDING APP SHARED VISIT CONTRIBUTION OF CARE
Emergency Medicine Attending MD Finn:  patient seen and evaluated with the PA.  I was present for key portions of the History and Physical, and I agree with the Impression and Plan.      Patient is a 59-year-old female complaining of 2 days bandlike lower abdominal pain.  Quality is similar to a prior episode of diverticulitis.    Associated symptoms: No fevers, no chills    Vital signs: Blood pressure 92/65, respirations 18, heart rate 63, temperature 98.5, O2 sat 100 send on room air  Gen: Well appearing F in NAD.  Head: NC/AT.   PERRL, EOMI.  Neck: trachea midline, supple.  Resp:  No distress, CTA B.  Cardiac RRR, no RMG.    Abdomen:  soft, nondistended, + bilateral (left greater than right) lower abdominal tenderness to palpation with voluntary guarding  Ext: no deformities, no edema.  Neuro:  A&Ox4 appears non focal. Skin:  Warm and dry as visualized, no rash.   Psych:  Normal affect and mood.    Medical Decision Making / Differential Diagnosis:  HPI concerning for acute diverticulitis, exam warrants empiric coverage with antibiotics as well as CT abdomen.

## 2024-06-02 NOTE — ED PROVIDER NOTE - OBJECTIVE STATEMENT
Patient is a 60 y/o female with a PMHx of diverticulitis, ventral hernia repair presenting for LLQ abdominal pain x 1days. She states pain started yesterday morning and gradually worsened as the day progressed, described as a tight quality and 7/10 on pain scale. Patient is having regular bowel movements and passing gas, BMs increase pain but provide relief upon completion. patient denies fever/chills, vomiting, diarrhea, BRBPR, melena, food intolerance, decreased appetite, dysuria, urinary frequency/urgency, flank pain, chest pain, SOB.

## 2024-06-02 NOTE — H&P ADULT - NSHPLABSRESULTS_GEN_ALL_CORE
< from: CT Abdomen and Pelvis w/ IV Cont (06.02.24 @ 13:27) >      PROCEDURE:  CT of the Abdomen and Pelvis was performed.  Sagittal and coronal reformats were performed.    FINDINGS:    LOWER CHEST: No visualized pleural effusion. Dependent   atelectasis/scarring of the imaged thorax.    LIVER: Liver size within normal limits. Left hepatic cysts. Additional   subcentimeter hepatic hypodensities are too small to characterize.  BILE DUCTS: No distention  GALLBLADDER: Cholecystectomy.  SPLEEN: Spleen size within normal limits  PANCREAS: No acute peripancreatic inflammation  ADRENALS: Unremarkable  KIDNEYS/URETERS: No hydronephrosis or obstructing ureteral calculus.   Nonobstructing 4 mm right renal calculus.    BLADDER: Minimally distended.  REPRODUCTIVE ORGANS: Hysterectomy. Phleboliths of the pelvis noted.    BOWEL and PERITONEUM: Stomach is underdistended with layering hyperdense   material at the gastric fundus, of unclear significance. Correlate for   recently ingested material. No small bowel distention. Appendix is   nondistended and nonobstructed. Mild stool burden of the colon limits   evaluation of the colonic mucosa. Right colon demonstrates mild long   segment thickening, nonspecific. There is colonic diverticulosis with   acute diverticular inflammation at the proximal sigmoid colon. Small   intramural/pericolonic collection at the proximal sigmoid colon measures   1.5 x 1.5cm, image 88 series 301. Follow to resolution. Small free fluid   in the dependent pelvis. No clear evidence of free air.  PERITONEUM: See above 'bowel and peritoneum' section.  VESSELS: No abdominal aortic aneurysm.  RETROPERITONEUM/LYMPH NODES: No enlarged lymph nodes by CT size criteria.  ABDOMINAL WALL: Unremarkable  BONES: Degenerative changes.    IMPRESSION:    Acute sigmoid diverticulitis. Small intramural/pericolonic collection at   the proximal sigmoid colon measures 1.5 x 1.5 cm, image 88 series 301. No   clear evidence of free air. Long segment mild thickening of the right   colon is nonspecific. Additional right-sided colitis cannot be excluded.   Recommend colonoscopy once acute symptoms have resolved to exclude any   potential underlying lesion.    --- End of Report ---    < end of copied text >

## 2024-06-02 NOTE — H&P ADULT - ASSESSMENT
59F second lifetime diverticulitis, with 1.5x1.5cm perisigmoid abscess, no diffuse peritonitis.     Recs:  - admit  - npo  - ivf  - IV metoprolol and IV synthroid  - serial exams, examine before pain meds  - no IR consult given small size   - IV abx  - d/w Dr. Lizy Klein MD, PGY3  Red Surgery   [id number]

## 2024-06-02 NOTE — ED PROVIDER NOTE - CARE PLAN
Principal Discharge DX:	Diverticulitis of intestine without perforation or abscess with bleeding   1

## 2024-06-02 NOTE — ED PROVIDER NOTE - NSFOLLOWUPINSTRUCTIONS_ED_ALL_ED_FT
Diverticulitis    Diverticulitis is inflammation or infection of small pouches in your colon that form when you HAVE a condition called diverticulosis. This condition can range from mild to severe potentially leading to perforation or obstructions of your colon. Symptoms include abdominal pain, fever/chills, nausea, vomiting, diarrhea, constipation, or blood in your stool. If you were prescribed an antibiotic medicine, take it as told by your health care provider. Do not stop taking the antibiotic even if you start to feel better.    SEEK IMMEDIATE MEDICAL CARE IF YOU HAVE ANY OF THE FOLLOWING SYMPTOMS: worsening abdominal pain, high fever, inability to hold down liquids or medication, black or bloody stools, inability to pass gas, lightheadedness/dizziness, or a change in mental status.    You can take Augmentin, 875/125mg, twice a day for 10 days.

## 2024-06-03 LAB
ANION GAP SERPL CALC-SCNC: 15 MMOL/L — SIGNIFICANT CHANGE UP (ref 5–17)
BUN SERPL-MCNC: 8 MG/DL — SIGNIFICANT CHANGE UP (ref 7–23)
CALCIUM SERPL-MCNC: 8.4 MG/DL — SIGNIFICANT CHANGE UP (ref 8.4–10.5)
CHLORIDE SERPL-SCNC: 101 MMOL/L — SIGNIFICANT CHANGE UP (ref 96–108)
CO2 SERPL-SCNC: 23 MMOL/L — SIGNIFICANT CHANGE UP (ref 22–31)
CREAT SERPL-MCNC: 0.69 MG/DL — SIGNIFICANT CHANGE UP (ref 0.5–1.3)
CULTURE RESULTS: NO GROWTH — SIGNIFICANT CHANGE UP
EGFR: 101 ML/MIN/1.73M2 — SIGNIFICANT CHANGE UP
GLUCOSE SERPL-MCNC: 76 MG/DL — SIGNIFICANT CHANGE UP (ref 70–99)
HCT VFR BLD CALC: 33.1 % — LOW (ref 34.5–45)
HGB BLD-MCNC: 10.9 G/DL — LOW (ref 11.5–15.5)
MAGNESIUM SERPL-MCNC: 1.8 MG/DL — SIGNIFICANT CHANGE UP (ref 1.6–2.6)
MCHC RBC-ENTMCNC: 29.4 PG — SIGNIFICANT CHANGE UP (ref 27–34)
MCHC RBC-ENTMCNC: 32.9 GM/DL — SIGNIFICANT CHANGE UP (ref 32–36)
MCV RBC AUTO: 89.2 FL — SIGNIFICANT CHANGE UP (ref 80–100)
NRBC # BLD: 0 /100 WBCS — SIGNIFICANT CHANGE UP (ref 0–0)
PHOSPHATE SERPL-MCNC: 3.2 MG/DL — SIGNIFICANT CHANGE UP (ref 2.5–4.5)
PLATELET # BLD AUTO: 261 K/UL — SIGNIFICANT CHANGE UP (ref 150–400)
POTASSIUM SERPL-MCNC: 3.7 MMOL/L — SIGNIFICANT CHANGE UP (ref 3.5–5.3)
POTASSIUM SERPL-SCNC: 3.7 MMOL/L — SIGNIFICANT CHANGE UP (ref 3.5–5.3)
RBC # BLD: 3.71 M/UL — LOW (ref 3.8–5.2)
RBC # FLD: 12.6 % — SIGNIFICANT CHANGE UP (ref 10.3–14.5)
SODIUM SERPL-SCNC: 139 MMOL/L — SIGNIFICANT CHANGE UP (ref 135–145)
SPECIMEN SOURCE: SIGNIFICANT CHANGE UP
WBC # BLD: 6.22 K/UL — SIGNIFICANT CHANGE UP (ref 3.8–10.5)
WBC # FLD AUTO: 6.22 K/UL — SIGNIFICANT CHANGE UP (ref 3.8–10.5)

## 2024-06-03 PROCEDURE — 74018 RADEX ABDOMEN 1 VIEW: CPT | Mod: 26

## 2024-06-03 RX ORDER — ACETAMINOPHEN 500 MG
1000 TABLET ORAL ONCE
Refills: 0 | Status: COMPLETED | OUTPATIENT
Start: 2024-06-03 | End: 2024-06-03

## 2024-06-03 RX ORDER — MAGNESIUM SULFATE 500 MG/ML
1 VIAL (ML) INJECTION ONCE
Refills: 0 | Status: COMPLETED | OUTPATIENT
Start: 2024-06-03 | End: 2024-06-03

## 2024-06-03 RX ORDER — HEPARIN SODIUM 5000 [USP'U]/ML
5000 INJECTION INTRAVENOUS; SUBCUTANEOUS EVERY 8 HOURS
Refills: 0 | Status: DISCONTINUED | OUTPATIENT
Start: 2024-06-03 | End: 2024-06-03

## 2024-06-03 RX ORDER — HEPARIN SODIUM 5000 [USP'U]/ML
5000 INJECTION INTRAVENOUS; SUBCUTANEOUS EVERY 8 HOURS
Refills: 0 | Status: DISCONTINUED | OUTPATIENT
Start: 2024-06-03 | End: 2024-06-06

## 2024-06-03 RX ORDER — ONDANSETRON 8 MG/1
4 TABLET, FILM COATED ORAL ONCE
Refills: 0 | Status: COMPLETED | OUTPATIENT
Start: 2024-06-03 | End: 2024-06-03

## 2024-06-03 RX ORDER — POTASSIUM CHLORIDE 20 MEQ
10 PACKET (EA) ORAL
Refills: 0 | Status: COMPLETED | OUTPATIENT
Start: 2024-06-03 | End: 2024-06-03

## 2024-06-03 RX ADMIN — Medication 100 MILLIGRAM(S): at 05:43

## 2024-06-03 RX ADMIN — Medication 400 MILLIGRAM(S): at 06:02

## 2024-06-03 RX ADMIN — Medication 100 MILLIGRAM(S): at 21:24

## 2024-06-03 RX ADMIN — Medication 90 MICROGRAM(S): at 05:43

## 2024-06-03 RX ADMIN — Medication 200 MILLIGRAM(S): at 11:44

## 2024-06-03 RX ADMIN — Medication 100 MILLIEQUIVALENT(S): at 11:43

## 2024-06-03 RX ADMIN — Medication 100 MILLIGRAM(S): at 14:57

## 2024-06-03 RX ADMIN — Medication 100 GRAM(S): at 08:53

## 2024-06-03 RX ADMIN — Medication 100 MILLIEQUIVALENT(S): at 08:53

## 2024-06-03 RX ADMIN — Medication 200 MILLIGRAM(S): at 23:37

## 2024-06-03 RX ADMIN — SODIUM CHLORIDE 100 MILLILITER(S): 9 INJECTION, SOLUTION INTRAVENOUS at 18:25

## 2024-06-03 RX ADMIN — Medication 1000 MILLIGRAM(S): at 06:32

## 2024-06-03 RX ADMIN — Medication 100 MILLIEQUIVALENT(S): at 14:57

## 2024-06-03 RX ADMIN — HEPARIN SODIUM 5000 UNIT(S): 5000 INJECTION INTRAVENOUS; SUBCUTANEOUS at 14:57

## 2024-06-03 RX ADMIN — ONDANSETRON 4 MILLIGRAM(S): 8 TABLET, FILM COATED ORAL at 12:42

## 2024-06-03 RX ADMIN — Medication 90 MICROGRAM(S): at 21:22

## 2024-06-03 RX ADMIN — HEPARIN SODIUM 5000 UNIT(S): 5000 INJECTION INTRAVENOUS; SUBCUTANEOUS at 21:21

## 2024-06-04 LAB
ANION GAP SERPL CALC-SCNC: 16 MMOL/L — SIGNIFICANT CHANGE UP (ref 5–17)
BUN SERPL-MCNC: 7 MG/DL — SIGNIFICANT CHANGE UP (ref 7–23)
CALCIUM SERPL-MCNC: 8.7 MG/DL — SIGNIFICANT CHANGE UP (ref 8.4–10.5)
CHLORIDE SERPL-SCNC: 101 MMOL/L — SIGNIFICANT CHANGE UP (ref 96–108)
CO2 SERPL-SCNC: 23 MMOL/L — SIGNIFICANT CHANGE UP (ref 22–31)
CREAT SERPL-MCNC: 0.7 MG/DL — SIGNIFICANT CHANGE UP (ref 0.5–1.3)
EGFR: 101 ML/MIN/1.73M2 — SIGNIFICANT CHANGE UP
GLUCOSE SERPL-MCNC: 63 MG/DL — LOW (ref 70–99)
HCT VFR BLD CALC: 32 % — LOW (ref 34.5–45)
HGB BLD-MCNC: 10.5 G/DL — LOW (ref 11.5–15.5)
MAGNESIUM SERPL-MCNC: 1.9 MG/DL — SIGNIFICANT CHANGE UP (ref 1.6–2.6)
MCHC RBC-ENTMCNC: 29.3 PG — SIGNIFICANT CHANGE UP (ref 27–34)
MCHC RBC-ENTMCNC: 32.8 GM/DL — SIGNIFICANT CHANGE UP (ref 32–36)
MCV RBC AUTO: 89.4 FL — SIGNIFICANT CHANGE UP (ref 80–100)
NRBC # BLD: 0 /100 WBCS — SIGNIFICANT CHANGE UP (ref 0–0)
PHOSPHATE SERPL-MCNC: 2.8 MG/DL — SIGNIFICANT CHANGE UP (ref 2.5–4.5)
PLATELET # BLD AUTO: 255 K/UL — SIGNIFICANT CHANGE UP (ref 150–400)
POTASSIUM SERPL-MCNC: 3.7 MMOL/L — SIGNIFICANT CHANGE UP (ref 3.5–5.3)
POTASSIUM SERPL-SCNC: 3.7 MMOL/L — SIGNIFICANT CHANGE UP (ref 3.5–5.3)
RBC # BLD: 3.58 M/UL — LOW (ref 3.8–5.2)
RBC # FLD: 12.5 % — SIGNIFICANT CHANGE UP (ref 10.3–14.5)
SODIUM SERPL-SCNC: 140 MMOL/L — SIGNIFICANT CHANGE UP (ref 135–145)
WBC # BLD: 3.8 K/UL — SIGNIFICANT CHANGE UP (ref 3.8–10.5)
WBC # FLD AUTO: 3.8 K/UL — SIGNIFICANT CHANGE UP (ref 3.8–10.5)

## 2024-06-04 RX ORDER — ONDANSETRON 8 MG/1
4 TABLET, FILM COATED ORAL EVERY 6 HOURS
Refills: 0 | Status: COMPLETED | OUTPATIENT
Start: 2024-06-04 | End: 2024-06-06

## 2024-06-04 RX ORDER — POTASSIUM PHOSPHATE, MONOBASIC POTASSIUM PHOSPHATE, DIBASIC 236; 224 MG/ML; MG/ML
15 INJECTION, SOLUTION INTRAVENOUS ONCE
Refills: 0 | Status: COMPLETED | OUTPATIENT
Start: 2024-06-04 | End: 2024-06-04

## 2024-06-04 RX ORDER — POTASSIUM CHLORIDE 20 MEQ
10 PACKET (EA) ORAL
Refills: 0 | Status: COMPLETED | OUTPATIENT
Start: 2024-06-04 | End: 2024-06-04

## 2024-06-04 RX ORDER — MAGNESIUM SULFATE 500 MG/ML
1 VIAL (ML) INJECTION ONCE
Refills: 0 | Status: COMPLETED | OUTPATIENT
Start: 2024-06-04 | End: 2024-06-04

## 2024-06-04 RX ADMIN — Medication 100 MILLIGRAM(S): at 05:57

## 2024-06-04 RX ADMIN — HEPARIN SODIUM 5000 UNIT(S): 5000 INJECTION INTRAVENOUS; SUBCUTANEOUS at 05:58

## 2024-06-04 RX ADMIN — Medication 100 MILLIEQUIVALENT(S): at 11:19

## 2024-06-04 RX ADMIN — HEPARIN SODIUM 5000 UNIT(S): 5000 INJECTION INTRAVENOUS; SUBCUTANEOUS at 21:25

## 2024-06-04 RX ADMIN — ONDANSETRON 4 MILLIGRAM(S): 8 TABLET, FILM COATED ORAL at 19:22

## 2024-06-04 RX ADMIN — Medication 100 MILLIGRAM(S): at 13:15

## 2024-06-04 RX ADMIN — Medication 90 MICROGRAM(S): at 21:25

## 2024-06-04 RX ADMIN — Medication 100 GRAM(S): at 13:21

## 2024-06-04 RX ADMIN — POTASSIUM PHOSPHATE, MONOBASIC POTASSIUM PHOSPHATE, DIBASIC 62.5 MILLIMOLE(S): 236; 224 INJECTION, SOLUTION INTRAVENOUS at 13:21

## 2024-06-04 RX ADMIN — Medication 100 MILLIEQUIVALENT(S): at 13:13

## 2024-06-04 RX ADMIN — Medication 200 MILLIGRAM(S): at 11:19

## 2024-06-04 RX ADMIN — Medication 100 MILLIGRAM(S): at 22:30

## 2024-06-04 RX ADMIN — Medication 100 MILLIEQUIVALENT(S): at 18:01

## 2024-06-04 RX ADMIN — Medication 5 MILLIGRAM(S): at 18:09

## 2024-06-04 RX ADMIN — ONDANSETRON 4 MILLIGRAM(S): 8 TABLET, FILM COATED ORAL at 10:07

## 2024-06-04 RX ADMIN — Medication 5 MILLIGRAM(S): at 11:19

## 2024-06-04 RX ADMIN — HEPARIN SODIUM 5000 UNIT(S): 5000 INJECTION INTRAVENOUS; SUBCUTANEOUS at 13:14

## 2024-06-05 ENCOUNTER — TRANSCRIPTION ENCOUNTER (OUTPATIENT)
Age: 59
End: 2024-06-05

## 2024-06-05 LAB
ANION GAP SERPL CALC-SCNC: 9 MMOL/L — SIGNIFICANT CHANGE UP (ref 5–17)
BUN SERPL-MCNC: 6 MG/DL — LOW (ref 7–23)
CALCIUM SERPL-MCNC: 8.7 MG/DL — SIGNIFICANT CHANGE UP (ref 8.4–10.5)
CHLORIDE SERPL-SCNC: 102 MMOL/L — SIGNIFICANT CHANGE UP (ref 96–108)
CO2 SERPL-SCNC: 27 MMOL/L — SIGNIFICANT CHANGE UP (ref 22–31)
CREAT SERPL-MCNC: 0.65 MG/DL — SIGNIFICANT CHANGE UP (ref 0.5–1.3)
EGFR: 103 ML/MIN/1.73M2 — SIGNIFICANT CHANGE UP
GLUCOSE SERPL-MCNC: 85 MG/DL — SIGNIFICANT CHANGE UP (ref 70–99)
HCT VFR BLD CALC: 34.2 % — LOW (ref 34.5–45)
HGB BLD-MCNC: 11.3 G/DL — LOW (ref 11.5–15.5)
MAGNESIUM SERPL-MCNC: 1.9 MG/DL — SIGNIFICANT CHANGE UP (ref 1.6–2.6)
MCHC RBC-ENTMCNC: 29.8 PG — SIGNIFICANT CHANGE UP (ref 27–34)
MCHC RBC-ENTMCNC: 33 GM/DL — SIGNIFICANT CHANGE UP (ref 32–36)
MCV RBC AUTO: 90.2 FL — SIGNIFICANT CHANGE UP (ref 80–100)
NRBC # BLD: 0 /100 WBCS — SIGNIFICANT CHANGE UP (ref 0–0)
PHOSPHATE SERPL-MCNC: 2.8 MG/DL — SIGNIFICANT CHANGE UP (ref 2.5–4.5)
PLATELET # BLD AUTO: 298 K/UL — SIGNIFICANT CHANGE UP (ref 150–400)
POTASSIUM SERPL-MCNC: 3.7 MMOL/L — SIGNIFICANT CHANGE UP (ref 3.5–5.3)
POTASSIUM SERPL-SCNC: 3.7 MMOL/L — SIGNIFICANT CHANGE UP (ref 3.5–5.3)
RBC # BLD: 3.79 M/UL — LOW (ref 3.8–5.2)
RBC # FLD: 12.3 % — SIGNIFICANT CHANGE UP (ref 10.3–14.5)
SODIUM SERPL-SCNC: 138 MMOL/L — SIGNIFICANT CHANGE UP (ref 135–145)
WBC # BLD: 3.52 K/UL — LOW (ref 3.8–10.5)
WBC # FLD AUTO: 3.52 K/UL — LOW (ref 3.8–10.5)

## 2024-06-05 RX ORDER — PANTOPRAZOLE SODIUM 20 MG/1
40 TABLET, DELAYED RELEASE ORAL
Refills: 0 | Status: DISCONTINUED | OUTPATIENT
Start: 2024-06-05 | End: 2024-06-06

## 2024-06-05 RX ORDER — BUPROPION HYDROCHLORIDE 150 MG/1
150 TABLET, EXTENDED RELEASE ORAL DAILY
Refills: 0 | Status: DISCONTINUED | OUTPATIENT
Start: 2024-06-05 | End: 2024-06-05

## 2024-06-05 RX ORDER — MAGNESIUM SULFATE 500 MG/ML
1 VIAL (ML) INJECTION ONCE
Refills: 0 | Status: COMPLETED | OUTPATIENT
Start: 2024-06-05 | End: 2024-06-05

## 2024-06-05 RX ORDER — BUPROPION HYDROCHLORIDE 150 MG/1
300 TABLET, EXTENDED RELEASE ORAL DAILY
Refills: 0 | Status: DISCONTINUED | OUTPATIENT
Start: 2024-06-06 | End: 2024-06-06

## 2024-06-05 RX ORDER — LEVOTHYROXINE SODIUM 125 MCG
112 TABLET ORAL DAILY
Refills: 0 | Status: DISCONTINUED | OUTPATIENT
Start: 2024-06-06 | End: 2024-06-06

## 2024-06-05 RX ORDER — LEVOTHYROXINE SODIUM 125 MCG
112 TABLET ORAL DAILY
Refills: 0 | Status: DISCONTINUED | OUTPATIENT
Start: 2024-06-05 | End: 2024-06-05

## 2024-06-05 RX ORDER — BUPROPION HYDROCHLORIDE 150 MG/1
325 TABLET, EXTENDED RELEASE ORAL DAILY
Refills: 0 | Status: DISCONTINUED | OUTPATIENT
Start: 2024-06-05 | End: 2024-06-05

## 2024-06-05 RX ORDER — SODIUM,POTASSIUM PHOSPHATES 278-250MG
1 POWDER IN PACKET (EA) ORAL ONCE
Refills: 0 | Status: COMPLETED | OUTPATIENT
Start: 2024-06-05 | End: 2024-06-05

## 2024-06-05 RX ADMIN — Medication 200 MILLIGRAM(S): at 00:22

## 2024-06-05 RX ADMIN — Medication 100 GRAM(S): at 11:56

## 2024-06-05 RX ADMIN — HEPARIN SODIUM 5000 UNIT(S): 5000 INJECTION INTRAVENOUS; SUBCUTANEOUS at 21:39

## 2024-06-05 RX ADMIN — Medication 100 MILLIGRAM(S): at 13:56

## 2024-06-05 RX ADMIN — SODIUM CHLORIDE 75 MILLILITER(S): 9 INJECTION, SOLUTION INTRAVENOUS at 17:19

## 2024-06-05 RX ADMIN — ONDANSETRON 4 MILLIGRAM(S): 8 TABLET, FILM COATED ORAL at 11:56

## 2024-06-05 RX ADMIN — HEPARIN SODIUM 5000 UNIT(S): 5000 INJECTION INTRAVENOUS; SUBCUTANEOUS at 13:56

## 2024-06-05 RX ADMIN — Medication 5 MILLIGRAM(S): at 12:02

## 2024-06-05 RX ADMIN — ONDANSETRON 4 MILLIGRAM(S): 8 TABLET, FILM COATED ORAL at 05:13

## 2024-06-05 RX ADMIN — Medication 100 MILLIGRAM(S): at 21:39

## 2024-06-05 RX ADMIN — ONDANSETRON 4 MILLIGRAM(S): 8 TABLET, FILM COATED ORAL at 23:12

## 2024-06-05 RX ADMIN — Medication 5 MILLIGRAM(S): at 05:13

## 2024-06-05 RX ADMIN — Medication 1 PACKET(S): at 11:55

## 2024-06-05 RX ADMIN — ONDANSETRON 4 MILLIGRAM(S): 8 TABLET, FILM COATED ORAL at 17:19

## 2024-06-05 RX ADMIN — Medication 100 MILLIGRAM(S): at 05:13

## 2024-06-05 RX ADMIN — ONDANSETRON 4 MILLIGRAM(S): 8 TABLET, FILM COATED ORAL at 00:22

## 2024-06-05 RX ADMIN — Medication 200 MILLIGRAM(S): at 11:55

## 2024-06-05 RX ADMIN — Medication 5 MILLIGRAM(S): at 00:22

## 2024-06-05 RX ADMIN — Medication 200 MILLIGRAM(S): at 23:12

## 2024-06-05 RX ADMIN — HEPARIN SODIUM 5000 UNIT(S): 5000 INJECTION INTRAVENOUS; SUBCUTANEOUS at 05:13

## 2024-06-05 NOTE — DISCHARGE NOTE PROVIDER - PROVIDER TOKENS
PROVIDER:[TOKEN:[[id number]],FOLLOWUP:[1 week]] PROVIDER:[TOKEN:[[id number]],FOLLOWUP:[2 weeks]]

## 2024-06-05 NOTE — DISCHARGE NOTE PROVIDER - NSDCMRMEDTOKEN_GEN_ALL_CORE_FT
acetaminophen 325 mg oral tablet: 1 tab(s) orally every 4 hours, As needed, Mild Pain (1 - 3)  buPROPion 150 mg/24 hours (XL) oral tablet, extended release: 3 tabs (450 mg) orally every 24 hours  Cipro 500 mg oral tablet: 1 tab(s) orally 2 times a day  diazePAM 5 mg oral tablet: 1 tab(s) orally every 6 hours, As Needed  ergocalciferol 1.25 mg (50,000 intl units) oral capsule: 1 cap(s) orally once a week   esomeprazole 40 mg oral delayed release capsule: 1 cap(s) orally once a day  levothyroxine 112 mcg (0.112 mg) oral tablet: 1 tab(s) orally once a day  metroNIDAZOLE 500 mg oral tablet: 1 tab(s) orally every 8 hours  oxycodone-acetaminophen 5 mg-325 mg oral tablet: 1 - 2 tab(s) orally every 4 - 6 hours, As Needed for pain   acetaminophen 325 mg oral tablet: 1 tab(s) orally every 4 hours, As needed, Mild Pain (1 - 3)  buPROPion 150 mg/24 hours (XL) oral tablet, extended release: 3 tabs (450 mg) orally every 24 hours  Cipro 500 mg oral tablet: 1 tab(s) orally every 12 hours MDD: 2  diazePAM 5 mg oral tablet: 1 tab(s) orally every 6 hours, As Needed  ergocalciferol 1.25 mg (50,000 intl units) oral capsule: 1 cap(s) orally once a week   esomeprazole 40 mg oral delayed release capsule: 1 cap(s) orally once a day  levothyroxine 112 mcg (0.112 mg) oral tablet: 1 tab(s) orally once a day  metroNIDAZOLE 500 mg oral tablet: 1 tab(s) orally every 8 hours MDD: 3

## 2024-06-05 NOTE — DISCHARGE NOTE PROVIDER - CARE PROVIDER_API CALL
Nikhil Medel  Colon/Rectal Surgery  [address] [place] [id number]  Phone: [phone]  Fax: [phone]  Follow Up Time: 1 week   Nikhil Medel  Colon/Rectal Surgery  [address] [place] [id number]  Phone: [phone]  Fax: [phone]  Follow Up Time: 2 weeks

## 2024-06-05 NOTE — DISCHARGE NOTE PROVIDER - HOSPITAL COURSE
59F with HTN and hypothyroidism with abd pain x1.5 days no nausea no vomiting, no fevers or chills, worst llq but goes from "hip to hip" one normal BM earlier today.  1 prior episode of diverticulitis 10y ago treated with PO abx no admission. Multiple prior gyn surgeries including hysterectomy, ventral hernia, mastopexy/abdominoplasty.  Last c-scope within 5 years and showed only diverticulosis per patient.  Former pharmaceutical rep.  CT scan showed Acute sigmoid diverticulitis. Small intramural/pericolonic collection at   the proximal sigmoid colon measures 1.5 x 1.5 cm.    She was admitted to the colorectal surgical service, was kept NPO/IV fluids on IV antibiotics.   When pain improved, she was given clear liquids which she tolerated without pain, nausea or vomiting.  Diet was then advanced to low fiber, which she is tolerating.  She is ambulating, voiding, is tolerating a diet and is stable for discharge home.  She will complete a course of oral antibiotics and will follow-up with Dr. Medel within 1-2 weeks.

## 2024-06-06 ENCOUNTER — TRANSCRIPTION ENCOUNTER (OUTPATIENT)
Age: 59
End: 2024-06-06

## 2024-06-06 VITALS
RESPIRATION RATE: 18 BRPM | TEMPERATURE: 98 F | SYSTOLIC BLOOD PRESSURE: 103 MMHG | DIASTOLIC BLOOD PRESSURE: 74 MMHG | OXYGEN SATURATION: 97 % | HEART RATE: 86 BPM

## 2024-06-06 LAB
ANION GAP SERPL CALC-SCNC: 12 MMOL/L — SIGNIFICANT CHANGE UP (ref 5–17)
BUN SERPL-MCNC: 9 MG/DL — SIGNIFICANT CHANGE UP (ref 7–23)
CALCIUM SERPL-MCNC: 8.8 MG/DL — SIGNIFICANT CHANGE UP (ref 8.4–10.5)
CHLORIDE SERPL-SCNC: 105 MMOL/L — SIGNIFICANT CHANGE UP (ref 96–108)
CO2 SERPL-SCNC: 24 MMOL/L — SIGNIFICANT CHANGE UP (ref 22–31)
CREAT SERPL-MCNC: 0.74 MG/DL — SIGNIFICANT CHANGE UP (ref 0.5–1.3)
EGFR: 94 ML/MIN/1.73M2 — SIGNIFICANT CHANGE UP
GLUCOSE SERPL-MCNC: 98 MG/DL — SIGNIFICANT CHANGE UP (ref 70–99)
HCT VFR BLD CALC: 35.1 % — SIGNIFICANT CHANGE UP (ref 34.5–45)
HGB BLD-MCNC: 11.5 G/DL — SIGNIFICANT CHANGE UP (ref 11.5–15.5)
MAGNESIUM SERPL-MCNC: 2 MG/DL — SIGNIFICANT CHANGE UP (ref 1.6–2.6)
MCHC RBC-ENTMCNC: 29.6 PG — SIGNIFICANT CHANGE UP (ref 27–34)
MCHC RBC-ENTMCNC: 32.8 GM/DL — SIGNIFICANT CHANGE UP (ref 32–36)
MCV RBC AUTO: 90.2 FL — SIGNIFICANT CHANGE UP (ref 80–100)
NRBC # BLD: 0 /100 WBCS — SIGNIFICANT CHANGE UP (ref 0–0)
PHOSPHATE SERPL-MCNC: 3.2 MG/DL — SIGNIFICANT CHANGE UP (ref 2.5–4.5)
PLATELET # BLD AUTO: 313 K/UL — SIGNIFICANT CHANGE UP (ref 150–400)
POTASSIUM SERPL-MCNC: 3.7 MMOL/L — SIGNIFICANT CHANGE UP (ref 3.5–5.3)
POTASSIUM SERPL-SCNC: 3.7 MMOL/L — SIGNIFICANT CHANGE UP (ref 3.5–5.3)
RBC # BLD: 3.89 M/UL — SIGNIFICANT CHANGE UP (ref 3.8–5.2)
RBC # FLD: 12.5 % — SIGNIFICANT CHANGE UP (ref 10.3–14.5)
SODIUM SERPL-SCNC: 141 MMOL/L — SIGNIFICANT CHANGE UP (ref 135–145)
WBC # BLD: 3.2 K/UL — LOW (ref 3.8–10.5)
WBC # FLD AUTO: 3.2 K/UL — LOW (ref 3.8–10.5)

## 2024-06-06 PROCEDURE — 83735 ASSAY OF MAGNESIUM: CPT

## 2024-06-06 PROCEDURE — 74177 CT ABD & PELVIS W/CONTRAST: CPT | Mod: MC

## 2024-06-06 PROCEDURE — 96374 THER/PROPH/DIAG INJ IV PUSH: CPT

## 2024-06-06 PROCEDURE — 85027 COMPLETE CBC AUTOMATED: CPT

## 2024-06-06 PROCEDURE — 82330 ASSAY OF CALCIUM: CPT

## 2024-06-06 PROCEDURE — 83605 ASSAY OF LACTIC ACID: CPT

## 2024-06-06 PROCEDURE — 83690 ASSAY OF LIPASE: CPT

## 2024-06-06 PROCEDURE — 84295 ASSAY OF SERUM SODIUM: CPT

## 2024-06-06 PROCEDURE — 36415 COLL VENOUS BLD VENIPUNCTURE: CPT

## 2024-06-06 PROCEDURE — 85025 COMPLETE CBC W/AUTO DIFF WBC: CPT

## 2024-06-06 PROCEDURE — 82803 BLOOD GASES ANY COMBINATION: CPT

## 2024-06-06 PROCEDURE — 85014 HEMATOCRIT: CPT

## 2024-06-06 PROCEDURE — 82435 ASSAY OF BLOOD CHLORIDE: CPT

## 2024-06-06 PROCEDURE — 85018 HEMOGLOBIN: CPT

## 2024-06-06 PROCEDURE — 82947 ASSAY GLUCOSE BLOOD QUANT: CPT

## 2024-06-06 PROCEDURE — 84132 ASSAY OF SERUM POTASSIUM: CPT

## 2024-06-06 PROCEDURE — 81001 URINALYSIS AUTO W/SCOPE: CPT

## 2024-06-06 PROCEDURE — 80048 BASIC METABOLIC PNL TOTAL CA: CPT

## 2024-06-06 PROCEDURE — 87086 URINE CULTURE/COLONY COUNT: CPT

## 2024-06-06 PROCEDURE — 74018 RADEX ABDOMEN 1 VIEW: CPT

## 2024-06-06 PROCEDURE — 80053 COMPREHEN METABOLIC PANEL: CPT

## 2024-06-06 PROCEDURE — 99285 EMERGENCY DEPT VISIT HI MDM: CPT

## 2024-06-06 PROCEDURE — 84100 ASSAY OF PHOSPHORUS: CPT

## 2024-06-06 PROCEDURE — 96375 TX/PRO/DX INJ NEW DRUG ADDON: CPT

## 2024-06-06 RX ORDER — METRONIDAZOLE 500 MG
1 TABLET ORAL
Qty: 30 | Refills: 0
Start: 2024-06-06 | End: 2024-06-15

## 2024-06-06 RX ORDER — OXYCODONE AND ACETAMINOPHEN 5; 325 MG/1; MG/1
1 TABLET ORAL
Qty: 0 | Refills: 0 | DISCHARGE

## 2024-06-06 RX ORDER — ONDANSETRON 8 MG/1
4 TABLET, FILM COATED ORAL ONCE
Refills: 0 | Status: COMPLETED | OUTPATIENT
Start: 2024-06-06 | End: 2024-06-06

## 2024-06-06 RX ORDER — CIPROFLOXACIN LACTATE 400MG/40ML
1 VIAL (ML) INTRAVENOUS
Qty: 20 | Refills: 0
Start: 2024-06-06 | End: 2024-06-15

## 2024-06-06 RX ADMIN — ONDANSETRON 4 MILLIGRAM(S): 8 TABLET, FILM COATED ORAL at 05:00

## 2024-06-06 RX ADMIN — Medication 100 MILLIGRAM(S): at 05:01

## 2024-06-06 RX ADMIN — PANTOPRAZOLE SODIUM 40 MILLIGRAM(S): 20 TABLET, DELAYED RELEASE ORAL at 05:01

## 2024-06-06 RX ADMIN — BUPROPION HYDROCHLORIDE 300 MILLIGRAM(S): 150 TABLET, EXTENDED RELEASE ORAL at 09:50

## 2024-06-06 RX ADMIN — Medication 112 MICROGRAM(S): at 05:00

## 2024-06-06 RX ADMIN — HEPARIN SODIUM 5000 UNIT(S): 5000 INJECTION INTRAVENOUS; SUBCUTANEOUS at 05:00

## 2024-06-06 RX ADMIN — ONDANSETRON 4 MILLIGRAM(S): 8 TABLET, FILM COATED ORAL at 10:34

## 2024-06-06 NOTE — PROGRESS NOTE ADULT - SUBJECTIVE AND OBJECTIVE BOX
SURGERY DAILY PROGRESS NOTE    SUBJECTIVE:     Overnight: no acute events     Patient seen and evaluated on AM rounds.   Patient otherwise denies nausea, vomiting, chest pain, shortness of breath     OBJECTIVE:  Vital Signs Last 24 Hrs  T(C): 36.6 (06 Jun 2024 04:59), Max: 36.8 (05 Jun 2024 08:51)  T(F): 97.9 (06 Jun 2024 04:59), Max: 98.2 (05 Jun 2024 08:51)  HR: 83 (06 Jun 2024 04:59) (77 - 92)  BP: 100/69 (06 Jun 2024 04:59) (100/69 - 124/76)  BP(mean): --  RR: 18 (06 Jun 2024 04:59) (18 - 18)  SpO2: 98% (06 Jun 2024 04:59) (95% - 98%)    Parameters below as of 06 Jun 2024 04:59  Patient On (Oxygen Delivery Method): room air      Daily     Daily   ALOK:      Chest Tube:      NG Tube:           STANDING  buPROPion XL (24-Hour) . 300 milliGRAM(s) Oral daily  ciprofloxacin   IVPB 400 milliGRAM(s) IV Intermittent every 12 hours  heparin   Injectable 5000 Unit(s) SubCutaneous every 8 hours  levothyroxine 112 MICROGram(s) Oral daily  metroNIDAZOLE  IVPB 500 milliGRAM(s) IV Intermittent every 8 hours  metroNIDAZOLE  IVPB      pantoprazole    Tablet 40 milliGRAM(s) Oral before breakfast    PRN      Labs:  138  |  102  |  6<L>  ----------------------------<  85    (06-05)  3.7   |  27  |  0.65          Ca    8.7      06-05  Mg    1.9  Phos  2.8                  Physical Exam:  General: NAD  Respiratory: respirations non labored  Abdominal: soft, nontender, nondistended  
SURGERY DAILY PROGRESS NOTE    SUBJECTIVE:     Overnight: no acute events     Patient seen and evaluated on AM rounds.   Patient otherwise denies nausea, vomiting, chest pain, shortness of breath     OBJECTIVE:  Vital Signs Last 24 Hrs  T(C): 36.8 (03 Jun 2024 04:43), Max: 37 (02 Jun 2024 13:21)  T(F): 98.2 (03 Jun 2024 04:43), Max: 98.6 (02 Jun 2024 13:21)  HR: 98 (03 Jun 2024 04:43) (63 - 98)  BP: 105/63 (03 Jun 2024 04:43) (92/65 - 144/73)  BP(mean): 72 (02 Jun 2024 13:21) (72 - 72)  RR: 18 (03 Jun 2024 04:43) (17 - 19)  SpO2: 97% (03 Jun 2024 04:43) (96% - 100%)    Parameters below as of 03 Jun 2024 04:43  Patient On (Oxygen Delivery Method): room air      Daily Height in cm: 160.02 (02 Jun 2024 10:59)    Daily   ALOK:      Chest Tube:      NG Tube:           STANDING  ciprofloxacin   IVPB 400 milliGRAM(s) IV Intermittent every 12 hours  enoxaparin Injectable 40 milliGRAM(s) SubCutaneous every 24 hours  lactated ringers. 1000 milliLiter(s) (100 mL/Hr) IV Continuous <Continuous>  levothyroxine Injectable 90 MICROGram(s) IV Push at bedtime  metoprolol tartrate Injectable 5 milliGRAM(s) IV Push every 6 hours  metroNIDAZOLE  IVPB 500 milliGRAM(s) IV Intermittent every 8 hours  metroNIDAZOLE  IVPB        PRN      Labs:  139  |  101  |  8  ----------------------------<  76    (06-03)  3.7   |  23  |  0.69          Ca    8.4      06-03  Mg    1.8  Phos  3.2          Urinalysis Basic - ( 03 Jun 2024 07:01 )    Color: x / Appearance: x / SG: x / pH: x  Gluc: 76 mg/dL / Ketone: x  / Bili: x / Urobili: x   Blood: x / Protein: x / Nitrite: x   Leuk Esterase: x / RBC: x / WBC x   Sq Epi: x / Non Sq Epi: x / Bacteria: x      Urinalysis Basic - ( 03 Jun 2024 07:01 )    Color: x / Appearance: x / SG: x / pH: x  Gluc: 76 mg/dL / Ketone: x  / Bili: x / Urobili: x   Blood: x / Protein: x / Nitrite: x   Leuk Esterase: x / RBC: x / WBC x   Sq Epi: x / Non Sq Epi: x / Bacteria: x          Physical Exam:  General: NAD  Respiratory: respirations non labored  Abdominal: Soft, tender LLQ and RLQ 
SURGERY DAILY PROGRESS NOTE    SUBJECTIVE:     Overnight: no acute events     Patient seen and evaluated on AM rounds.   Patient otherwise denies nausea, vomiting, chest pain, shortness of breath     OBJECTIVE:  Vital Signs Last 24 Hrs  T(C): 36.8 (04 Jun 2024 09:25), Max: 36.8 (03 Jun 2024 16:59)  T(F): 98.3 (04 Jun 2024 09:25), Max: 98.3 (03 Jun 2024 16:59)  HR: 92 (04 Jun 2024 09:25) (88 - 94)  BP: 120/83 (04 Jun 2024 09:25) (97/62 - 120/83)  BP(mean): --  RR: 18 (04 Jun 2024 09:25) (18 - 18)  SpO2: 96% (04 Jun 2024 09:25) (95% - 98%)    Parameters below as of 04 Jun 2024 09:25  Patient On (Oxygen Delivery Method): room air      Daily     Daily   ALOK:      Chest Tube:      NG Tube:           STANDING  ciprofloxacin   IVPB 400 milliGRAM(s) IV Intermittent every 12 hours  heparin   Injectable 5000 Unit(s) SubCutaneous every 8 hours  lactated ringers. 1000 milliLiter(s) (100 mL/Hr) IV Continuous <Continuous>  levothyroxine Injectable 90 MICROGram(s) IV Push at bedtime  magnesium sulfate  IVPB 1 Gram(s) IV Intermittent once  metoprolol tartrate Injectable 5 milliGRAM(s) IV Push every 6 hours  metroNIDAZOLE  IVPB 500 milliGRAM(s) IV Intermittent every 8 hours  metroNIDAZOLE  IVPB      ondansetron Injectable 4 milliGRAM(s) IV Push every 6 hours  potassium chloride  10 mEq/100 mL IVPB 10 milliEquivalent(s) IV Intermittent every 1 hour  potassium phosphate IVPB 15 milliMole(s) IV Intermittent once    PRN      Labs:  140  |  101  |  7  ----------------------------<  63<L>    (06-04)  3.7   |  23  |  0.70          Ca    8.7      06-04  Mg    1.9  Phos  2.8          Urinalysis Basic - ( 04 Jun 2024 07:24 )    Color: x / Appearance: x / SG: x / pH: x  Gluc: 63 mg/dL / Ketone: x  / Bili: x / Urobili: x   Blood: x / Protein: x / Nitrite: x   Leuk Esterase: x / RBC: x / WBC x   Sq Epi: x / Non Sq Epi: x / Bacteria: x      Urinalysis Basic - ( 04 Jun 2024 07:24 )    Color: x / Appearance: x / SG: x / pH: x  Gluc: 63 mg/dL / Ketone: x  / Bili: x / Urobili: x   Blood: x / Protein: x / Nitrite: x   Leuk Esterase: x / RBC: x / WBC x   Sq Epi: x / Non Sq Epi: x / Bacteria: x          Physical Exam:  General: NAD  Respiratory: respirations non labored  Abdominal: soft, nondistended, mild TTP, improving   
SURGERY DAILY PROGRESS NOTE    SUBJECTIVE:     Overnight: no acute events     Patient seen and evaluated on AM rounds.   Patient otherwise denies nausea, vomiting, chest pain, shortness of breath     OBJECTIVE:  Vital Signs Last 24 Hrs  T(C): 36.8 (05 Jun 2024 08:51), Max: 37.1 (05 Jun 2024 04:48)  T(F): 98.2 (05 Jun 2024 08:51), Max: 98.7 (05 Jun 2024 04:48)  HR: 79 (05 Jun 2024 08:51) (61 - 95)  BP: 107/70 (05 Jun 2024 08:51) (107/70 - 122/80)  BP(mean): --  RR: 18 (05 Jun 2024 08:51) (17 - 18)  SpO2: 98% (05 Jun 2024 08:51) (95% - 99%)    Parameters below as of 05 Jun 2024 08:51  Patient On (Oxygen Delivery Method): room air      Daily     Daily   ALOK:      Chest Tube:      NG Tube:           STANDING  ciprofloxacin   IVPB 400 milliGRAM(s) IV Intermittent every 12 hours  heparin   Injectable 5000 Unit(s) SubCutaneous every 8 hours  lactated ringers. 1000 milliLiter(s) (100 mL/Hr) IV Continuous <Continuous>  levothyroxine Injectable 90 MICROGram(s) IV Push at bedtime  magnesium sulfate  IVPB 1 Gram(s) IV Intermittent once  metoprolol tartrate Injectable 5 milliGRAM(s) IV Push every 6 hours  metroNIDAZOLE  IVPB      metroNIDAZOLE  IVPB 500 milliGRAM(s) IV Intermittent every 8 hours  ondansetron Injectable 4 milliGRAM(s) IV Push every 6 hours  potassium phosphate / sodium phosphate Powder (PHOS-NaK) 1 Packet(s) Oral once    PRN      Labs:  138  |  102  |  6<L>  ----------------------------<  85    (06-05)  3.7   |  27  |  0.65          Ca    8.7      06-05  Mg    1.9  Phos  2.8          Urinalysis Basic - ( 05 Jun 2024 07:48 )    Color: x / Appearance: x / SG: x / pH: x  Gluc: 85 mg/dL / Ketone: x  / Bili: x / Urobili: x   Blood: x / Protein: x / Nitrite: x   Leuk Esterase: x / RBC: x / WBC x   Sq Epi: x / Non Sq Epi: x / Bacteria: x      Urinalysis Basic - ( 05 Jun 2024 07:48 )    Color: x / Appearance: x / SG: x / pH: x  Gluc: 85 mg/dL / Ketone: x  / Bili: x / Urobili: x   Blood: x / Protein: x / Nitrite: x   Leuk Esterase: x / RBC: x / WBC x   Sq Epi: x / Non Sq Epi: x / Bacteria: x          Physical Exam:  General: NAD  Respiratory: respirations non labored  Abdominal: soft, nondistended, mild TTP, improving

## 2024-06-06 NOTE — PROGRESS NOTE ADULT - ASSESSMENT
59F second lifetime diverticulitis, with 1.5x1.5cm perisigmoid abscess, no diffuse peritonitis.     Plan:   - CLD + IVF  - IV metoprolol and IV synthroid  - serial exams, examine before pain meds  - no IR consult given small size   - IV abx: cipro + flagyl  - SQH  - encourage ambulation and IS    [facility] Surgery   [id number]
59F second lifetime diverticulitis, with 1.5x1.5cm perisigmoid abscess, no diffuse peritonitis.     Plan:   - CLD + IVF  - IV metoprolol and IV synthroid  - serial exams, examine before pain meds  - no IR consult given small size   - IV abx: cipro + flagyl  - SQH  - encourage ambulation and IS    [facility] Surgery   [id number]
59F second lifetime diverticulitis, with 1.5x1.5cm perisigmoid abscess, no diffuse peritonitis.     Plan:   - CLD + IVF  - IV metoprolol and IV synthroid  - serial exams, examine before pain meds  - no IR consult given small size   - IV abx: cipro + flagyl  - SQH  - encourage ambulation and IS    [facility] Surgery   [id number]
59F second lifetime diverticulitis, with 1.5x1.5cm perisigmoid abscess, no diffuse peritonitis.     Plan:   - npo/IVF  - IV metoprolol and IV synthroid  - serial exams, examine before pain meds  - no IR consult given small size   - IV abx: cipro + flagyl    Red Surgery   [id number]

## 2024-06-06 NOTE — DISCHARGE NOTE NURSING/CASE MANAGEMENT/SOCIAL WORK - PATIENT PORTAL LINK FT
You can access the FollowMyHealth Patient Portal offered by [facility] by registering at the following website: [email or web address]. By joining [employer]’s FollowMyHealth portal, you will also be able to view your health information using other applications (apps) compatible with our system.

## 2024-09-12 ENCOUNTER — APPOINTMENT (OUTPATIENT)
Dept: MAMMOGRAPHY | Facility: IMAGING CENTER | Age: 59
End: 2024-09-12
Payer: COMMERCIAL

## 2024-09-12 ENCOUNTER — OUTPATIENT (OUTPATIENT)
Dept: OUTPATIENT SERVICES | Facility: HOSPITAL | Age: 59
LOS: 1 days | End: 2024-09-12
Payer: COMMERCIAL

## 2024-09-12 ENCOUNTER — APPOINTMENT (OUTPATIENT)
Dept: ULTRASOUND IMAGING | Facility: IMAGING CENTER | Age: 59
End: 2024-09-12
Payer: COMMERCIAL

## 2024-09-12 DIAGNOSIS — Z90.49 ACQUIRED ABSENCE OF OTHER SPECIFIED PARTS OF DIGESTIVE TRACT: Chronic | ICD-10-CM

## 2024-09-12 DIAGNOSIS — Z98.890 OTHER SPECIFIED POSTPROCEDURAL STATES: Chronic | ICD-10-CM

## 2024-09-12 DIAGNOSIS — Z00.8 ENCOUNTER FOR OTHER GENERAL EXAMINATION: ICD-10-CM

## 2024-09-12 PROCEDURE — 77067 SCR MAMMO BI INCL CAD: CPT | Mod: 26

## 2024-09-12 PROCEDURE — 76641 ULTRASOUND BREAST COMPLETE: CPT | Mod: 26,50

## 2024-09-12 PROCEDURE — 76641 ULTRASOUND BREAST COMPLETE: CPT

## 2024-09-12 PROCEDURE — 77063 BREAST TOMOSYNTHESIS BI: CPT | Mod: 26

## 2024-09-12 PROCEDURE — 77067 SCR MAMMO BI INCL CAD: CPT

## 2024-09-12 PROCEDURE — 77063 BREAST TOMOSYNTHESIS BI: CPT

## 2024-09-27 NOTE — ASU PREOP CHECKLIST - DNR CLARIFICATION FORM COMPLETED
Thank you for bringing Flako in today!  Return in about 3 months (around 12/23/2024) for nutrition, liver, prediabetes follow up.     Schedule at : Endocrinology to discuss prediabetes & eating  Endocrinology ~ [facility] ~ Kami Lincoln MD, Gali Vieira MD; Alexandra Calvo MD; [facility]; [address] [place] [address] [phone]    Get fasting labs done in December before our next visit    [facility] - meet with pediatrician, nutritionist and behavior counselor to develop healthier lifestyle to decrease weight.   Call and schedule appointment at [phone], OPTION 1    Take flintstones multivitamin (white, chalky kind with calcium/vitamin d)  ____________________________    Tips for Healthy Lifestyles:  Eat 3 meals per day, especially breakfast!  At each meal, half of your plate should be vegetables or fruit, 1/4 should be lean protein (chicken or turkey breast or tofu), and 1/4 should be whole grains (whole wheat bread or brown rice/pasta)  Junk food, fried foods, and candy should be in small servings and only a few times weekly (candy, sweet baked goods, chips, hot cheetos, packaged foods). Fast food no more than 1x/week, and try to avoid fried foods.  Drink more water and no or very little soda or juice. Switch to skim milk.  Cook at home and eat dinner as a family as often as possible. Avoid snacking in front of the TV  Physical Activity: at least 30 minutes of moderate intensity activity at least 3-4 days per week (brisk walking, running, playing outside, playing sports, etc)    Follow 5-2-1-0!  5 fruits and vegetables a day  <2 hours of screen time   1 hour of exercise a day  0 sugary drinks/sodas a day  
n/a

## 2024-12-02 NOTE — ASU PATIENT PROFILE, ADULT - ACCEPTABLE
Lisdexamfetamine Dimesylate (VYVANSE) 20 MG Oral Chew Tab  [facility]  Advised dr is out of office - He has enough medication until Thursday.  
Requested Prescriptions     Pending Prescriptions Disp Refills    Lisdexamfetamine Dimesylate (VYVANSE) 20 MG Oral Chew Tab 30 tablet 0     Sig: Chew 20 mg by mouth daily.     Last refill 11/1/24 #30  LOV 9/21/24  No future appointments.    
3

## 2024-12-05 ENCOUNTER — DOCTOR'S OFFICE (OUTPATIENT)
Facility: LOCATION | Age: 59
Setting detail: OPHTHALMOLOGY
End: 2024-12-05
Payer: COMMERCIAL

## 2024-12-05 DIAGNOSIS — H15.101: ICD-10-CM

## 2024-12-05 PROCEDURE — 92014 COMPRE OPH EXAM EST PT 1/>: CPT | Performed by: OPHTHALMOLOGY

## 2024-12-05 ASSESSMENT — VISUAL ACUITY
OS_BCVA: 20/25-2
OD_BCVA: 20/25-

## 2024-12-05 ASSESSMENT — CONFRONTATIONAL VISUAL FIELD TEST (CVF)
OD_FINDINGS: FULL
OS_FINDINGS: FULL

## 2024-12-05 ASSESSMENT — LID EXAM ASSESSMENTS: OD_COMMENTS: RUL EVERTED

## 2024-12-05 ASSESSMENT — TONOMETRY
OS_IOP_MMHG: 13
OD_IOP_MMHG: 12

## 2024-12-16 ENCOUNTER — DOCTOR'S OFFICE (OUTPATIENT)
Facility: LOCATION | Age: 59
Setting detail: OPHTHALMOLOGY
End: 2024-12-16
Payer: COMMERCIAL

## 2024-12-16 DIAGNOSIS — H16.223: ICD-10-CM

## 2024-12-16 PROBLEM — H15.101 EPISCLERITIS, UNSPECIFIED; RIGHT EYE: Status: RESOLVED | Noted: 2024-12-05 | Resolved: 2024-12-16

## 2024-12-16 PROCEDURE — 92012 INTRM OPH EXAM EST PATIENT: CPT | Performed by: OPHTHALMOLOGY

## 2024-12-16 ASSESSMENT — SUPERFICIAL PUNCTATE KERATITIS (SPK)
OD_SPK: T
OS_SPK: T

## 2024-12-16 ASSESSMENT — CONFRONTATIONAL VISUAL FIELD TEST (CVF)
OD_FINDINGS: FULL
OS_FINDINGS: FULL

## 2024-12-16 ASSESSMENT — TONOMETRY
OD_IOP_MMHG: 13
OS_IOP_MMHG: 14

## 2024-12-17 ASSESSMENT — KERATOMETRY
OS_AXISANGLE_DEGREES: 110
OS_K2POWER_DIOPTERS: 47.25
OD_AXISANGLE_DEGREES: 030
OD_K1POWER_DIOPTERS: 44.50
OD_K2POWER_DIOPTERS: 45.25
OS_K1POWER_DIOPTERS: 46.50

## 2024-12-17 ASSESSMENT — REFRACTION_AUTOREFRACTION
OD_AXIS: 099
OS_SPHERE: -1.25
OS_CYLINDER: 0.00
OD_SPHERE: +1.25
OS_AXIS: 000
OD_CYLINDER: -0.50

## 2024-12-17 ASSESSMENT — VISUAL ACUITY
OD_BCVA: 20/30-1
OS_BCVA: 20/25

## 2024-12-26 NOTE — ED ADULT TRIAGE NOTE - DATE OF LAST VACCINATION
Hospitalist Progress Note      PCP: Tico Rodriguez DO    Date of Admission: 12/18/2024    Chief Complaint:  patient intubated/sedated this morning.     Medications:  Reviewed    Infusion Medications    sodium chloride      dextrose      sodium chloride       Scheduled Medications    insulin glargine  12 Units SubCUTAneous Daily    midodrine  20 mg Oral TID WC    methylPREDNISolone  10 mg IntraVENous Daily    metoprolol tartrate  25 mg Oral BID    cycloSPORINE  50 mg Oral Daily    mycophenolate  250 mg Oral BID    insulin lispro  0-16 Units SubCUTAneous Q4H    polyethylene glycol  17 g Oral Daily    docusate  100 mg Oral BID    amiodarone  200 mg Oral BID    pantoprazole (PROTONIX) 40 mg in sodium chloride (PF) 0.9 % 10 mL injection  40 mg IntraVENous Daily    sodium chloride flush  5-40 mL IntraVENous 2 times per day    [Held by provider] heparin (porcine)  5,000 Units SubCUTAneous 3 times per day    piperacillin-tazobactam  3,375 mg IntraVENous Q12H    [Held by provider] mycophenolate  500 mg Oral BID     PRN Meds: fentanNYL, sodium chloride, metoprolol, midodrine, hydrALAZINE, LORazepam, glucose, dextrose bolus **OR** dextrose bolus, glucagon (rDNA), dextrose, sodium chloride flush, sodium chloride, ondansetron **OR** ondansetron, acetaminophen **OR** acetaminophen, ipratropium 0.5 mg-albuterol 2.5 mg      Intake/Output Summary (Last 24 hours) at 12/26/2024 1507  Last data filed at 12/26/2024 1300  Gross per 24 hour   Intake 882.63 ml   Output 2700 ml   Net -1817.37 ml       Exam:    BP (!) 146/72   Pulse 90   Temp 97.2 °F (36.2 °C)   Resp 16   Ht 1.88 m (6' 2\")   Wt 76.3 kg (168 lb 3.4 oz)   SpO2 100%   BMI 21.60 kg/m²     General appearance: sedated  Lungs: diminished bilaterally  Heart: S1/S2,irregular  Abdomen: soft  Extremities: edema present bilateral UE      Labs:   Recent Labs     12/24/24  0452 12/24/24  1444 12/25/24  0416 12/25/24  1405 12/25/24 2001 12/26/24 0415   WBC 15.1*  --  11.1*  --   radial artery to cephalic outflow vein fistula. The fistula is   widely patent with cephalic blood flow volume of 821 cc/min which is sufficient   for dialysis. Radial artery flow volume is 774 cc/min which is appropriate for a   patent fistula. No evidence of stenosis or thrombus.      COMPARISON:No prior studies are available for comparison.      DIAGNOSIS: Patient with right forearm fistula, experiencing flow-volume   problems.      COMMENTS: What reading provider will be dictating this exam?->[facility]      TECHNIQUE: Grayscale and color Doppler ultrasound of right arm fistula      FINDINGS:       Radial ARTERY FLOW: 774 milliliters per minute.      CEPHALIC VEIN:   Flow: 821 milliliters per.   The cephalic vein is widely patent without any evidence of thrombus or stenosis.         Electronically signed by Joce Campos      CT HEAD WO CONTRAST   Final Result   No acute intracranial findings.         XR CHEST ABDOMEN NG PLACEMENT   Final Result   1. Endotracheal tube in satisfactory position.   2. Nasogastric tube with its tip in the gastric fundus but the proximal port   is above the GE junction. Recommend advancing the NG tube 10 cm to place the   proximal port below the GE junction.   3. New mild congestive failure.   4. New tiny right pleural effusion.   5. Moderate left lower lobe consolidation from either atelectasis or   pneumonia.      RECOMMENDATION:   Recommended advancing the NG tube 10 cm to place the tip below the GE   junction.         XR CHEST PORTABLE   Final Result   Emphysema.  No focal consolidation.         XR CHEST PORTABLE   Final Result   1. Cardiomegaly.   2. Bronchiectasis.   3. No active airspace consolidation.                 Assessment/Plan:    74 y.o. male NH resident with history of HTN, PAF, DM2, ESRD/CKD4 s/p kidney transplant x 2, BPH / urine retention , anemia/lower GI bleed requiring PRBC transfusion, depression, who presented with:     DELORES/CKD4 with AG metabolic acidosis  -  15-Wilder-2022

## 2025-01-21 ENCOUNTER — APPOINTMENT (OUTPATIENT)
Dept: ULTRASOUND IMAGING | Facility: CLINIC | Age: 60
End: 2025-01-21

## 2025-01-21 ENCOUNTER — APPOINTMENT (OUTPATIENT)
Dept: MAMMOGRAPHY | Facility: CLINIC | Age: 60
End: 2025-01-21

## 2025-03-19 NOTE — H&P PST ADULT - GENERAL
Airway  Reason: elective    Date/Time: 3/19/2025 9:11 AM  Airway not difficult    General Information and Staff    Patient location during procedure: OR  CRNA/CAA: Kishore Lubin CRNA    Indications and Patient Condition  Indications for airway management: airway protection    Preoxygenated: yes    Mask difficulty assessment: 1 - vent by mask    Final Airway Details    Final airway type: endotracheal airway      Successful airway: ETT  Cuffed: yes   Successful intubation technique: direct laryngoscopy and video laryngoscopy  Endotracheal tube insertion site: oral  Blade: Raphael  Blade size: 4  ETT size (mm): 7.5  Cormack-Lehane Classification: grade III - view of epiglottis only  Placement verified by: capnometry   Measured from: lips  ETT/EBT  to lips (cm): 22  Number of attempts at approach: 1  Assessment: lips, teeth, and gum same as pre-op and atraumatic intubation    Additional Comments  Grade 3 w/ yeung 2.. easy mask, easy intubation with raphael 4          
negative

## 2025-04-29 NOTE — ED ADULT NURSE NOTE - NS ED NOTE ABUSE SUSPICION NEGLECT YN
Patient was told at the pharmacy that the lantus does was increased and that she now has a 100$ dollar copay. Please advise  
Per chart review, pt is due for refill of Lantus.    According to La's note on 2/6/25:  - Lantus 10 units daily    F/U appt 5/6/25  Prescription sent.    RN called pharmacy who confirmed they received orders. Pharmacy states pt has $100 co-pay for 90 day supply but can pay less (~$20) for a 30 day supply. Pharmacy states they will call the pt and inform her of this and fill according to pt preference. No further action needed at this time.   
No

## 2025-06-03 ENCOUNTER — EMERGENCY (EMERGENCY)
Facility: HOSPITAL | Age: 60
LOS: 1 days | End: 2025-06-03
Admitting: EMERGENCY MEDICINE
Payer: COMMERCIAL

## 2025-06-03 VITALS
DIASTOLIC BLOOD PRESSURE: 82 MMHG | RESPIRATION RATE: 16 BRPM | HEART RATE: 86 BPM | TEMPERATURE: 98 F | OXYGEN SATURATION: 98 % | SYSTOLIC BLOOD PRESSURE: 126 MMHG | WEIGHT: 141.98 LBS

## 2025-06-03 DIAGNOSIS — Z98.890 OTHER SPECIFIED POSTPROCEDURAL STATES: Chronic | ICD-10-CM

## 2025-06-03 DIAGNOSIS — Z90.49 ACQUIRED ABSENCE OF OTHER SPECIFIED PARTS OF DIGESTIVE TRACT: Chronic | ICD-10-CM

## 2025-06-03 PROCEDURE — 73630 X-RAY EXAM OF FOOT: CPT | Mod: 26,RT

## 2025-06-03 PROCEDURE — 28430 CLTX TALUS FRACTURE W/O MNPJ: CPT | Mod: 54,RT

## 2025-06-03 PROCEDURE — 99285 EMERGENCY DEPT VISIT HI MDM: CPT | Mod: 57

## 2025-06-03 PROCEDURE — 71101 X-RAY EXAM UNILAT RIBS/CHEST: CPT | Mod: 26,RT

## 2025-06-03 PROCEDURE — 73610 X-RAY EXAM OF ANKLE: CPT | Mod: 26,RT

## 2025-06-03 PROCEDURE — 71045 X-RAY EXAM CHEST 1 VIEW: CPT | Mod: 26,59

## 2025-06-03 PROCEDURE — 71120 X-RAY EXAM BREASTBONE 2/>VWS: CPT | Mod: 26

## 2025-06-03 RX ORDER — IBUPROFEN 200 MG
600 TABLET ORAL ONCE
Refills: 0 | Status: COMPLETED | OUTPATIENT
Start: 2025-06-03 | End: 2025-06-03

## 2025-06-03 RX ORDER — LIDOCAINE HYDROCHLORIDE 20 MG/ML
1 JELLY TOPICAL ONCE
Refills: 0 | Status: COMPLETED | OUTPATIENT
Start: 2025-06-03 | End: 2025-06-03

## 2025-06-03 RX ADMIN — LIDOCAINE HYDROCHLORIDE 1 PATCH: 20 JELLY TOPICAL at 23:18

## 2025-06-03 RX ADMIN — Medication 600 MILLIGRAM(S): at 23:17

## 2025-06-03 NOTE — ED PROVIDER NOTE - PATIENT PORTAL LINK FT
You can access the FollowMyHealth Patient Portal offered by [facility] by registering at the following website: [email or web address]. By joining [employer]’s FollowMyHealth portal, you will also be able to view your health information using other applications (apps) compatible with our system.

## 2025-06-03 NOTE — ED PROVIDER NOTE - NSFOLLOWUPINSTRUCTIONS_ED_ALL_ED_FT
Follow up with Podiatrist Dr. García within 1 week by calling [phone] for an appointment. Return to the ED immediately for any worsening symptoms or new concerns.    Ankle Fracture  The ankle and foot showing the tibia, fibula, and talus.  An ankle fracture is a break in one, two, or all three of the bones in your ankle joint. The ankle joint is made up of:  The lower parts of your tibia and fibula. These are the bones in your lower leg.  A bone in the foot called the talus.  There are two types of ankle fractures:  Stable fracture. This happens when one of the bones is broken, but the bones of the ankle joint stay in their normal positions.  Unstable fracture. This type:  Can include more than one broken bone.  Can happen if the outer bone is broken and the ligaments of the inner ankle are also injured. Ligaments are tissues that connect bones to each other.  Lets the talus move out of its normal position.  What are the causes?  A hard, direct hit to the ankle.  Twisting your ankle fast and very badly.  Getting injured in a car crash or from a fall from a high place.  What increases the risk?  Being overweight.  Doing sports such as soccer, gymnastics, or football.  What are the signs or symptoms?  A tender and swollen ankle.  Bruising around your ankle.  Pain when you move or press on your ankle.  Trouble walking.  Not being able to use your ankle to support your body weight, or not being able to put weight on your ankle.  Pain that gets worse when you move your ankle or foot, or when you stand.  Pain that gets better with rest.  How is this diagnosed?  An ankle fracture is usually diagnosed with a physical exam and X-rays.    You may also have a CT scan or an MRI.    How is this treated?  Treatment depends on the type of ankle fracture you have.    Stable fractures are treated with:  A cast, boot, or splint to hold the ankle still.  Using crutches until the fracture heals. You will not be able to put weight on your ankle until it heals.  Unstable fractures are treated with:  Surgery. You will not be able to put weight on your injured ankle for several weeks.  After surgery, you will have a splint. After your cut from surgery heals, your surgeon may give you a cast or a boot.  Using crutches until the fracture heals. You will not be able to put weight on your ankle for several weeks.  After your ankle has healed, you'll do physical therapy exercises to help your ankle move better and get stronger.    Follow these instructions at home:  If you have a boot or splint that can be taken off:    Wear the boot or splint as told by your health care provider. Take it off only if your provider says you can.  Check the skin around it every day. Tell your provider if you see problems.  Loosen the boot or splint if your toes tingle, are numb, or turn cold and blue.  Keep the boot or splint clean and dry.  If you have a cast that cannot be taken off:    Do not put pressure on any part of the cast until it's hard. This may take a few hours.  Do not stick anything inside it to scratch your skin. Doing that raises your risk of infection.  Check the skin around the cast every day. Tell your provider if you see problems.  You may put lotion on dry skin around the cast. Do not put lotion on the skin underneath the cast.  Keep the cast clean and dry.  Bathing    Do not take baths, swim, or use a hot tub until told. Ask if showers are okay. You may need to take sponge baths only.  If the cast, boot, or splint is not waterproof:  Do not let it get wet.  Cover it when you take a bath or shower. Use a cover that does not let any water in.  Managing pain, stiffness, and swelling    Bag of ice on a towel on the skin.  If told, put ice on the area.  If you have a splint or boot that you can take off, remove it as told.  Put ice in a plastic bag.  Place a towel between your skin and the bag or between your cast and the bag.  Leave the ice on for 20 minutes, 2–3 times a day.  If your skin turns bright red, take off the ice right away to prevent skin damage. The risk of damage is higher if you can't feel pain, heat, or cold.  Move your toes often to reduce stiffness and swelling.  Raise the injured area above the level of your heart while you are sitting or lying down. Use a pillow to support your foot as needed.  Activity    Return to normal activities when you are told. Ask what things are safe for you to do.  Do not stand or walk on your injured ankle until you're told it's okay. Use crutches as told.  Do exercises as told by your provider.  General instructions    Take your medicines only as told by your provider.  Ask when it's safe to drive if you have a cast, boot, or splint on your ankle.  Do not smoke, vape, or use products with nicotine or tobacco in them. These can make healing take longer after surgery. If you need help quitting, talk with your provider.  Keep all follow-up visits. Your provider will need to check how your ankle is healing.  Contact a health care provider if:  You have pain or swelling that gets worse.  Your pain or swelling does not get better with rest or medicine.  Your cast gets damaged.  Get help right away if:  You develop new pain or swelling.  Your skin or toes below the injured ankle:  Turn blue or gray.  Feel cold.  Lose feeling.  Have less feeling than normal when something touches them.  This information is not intended to replace advice given to you by your health care provider. Make sure you discuss any questions you have with your health care provider.

## 2025-06-03 NOTE — ED ADULT NURSE NOTE - CHIEF COMPLAINT QUOTE
c/o right ankle and rib pain s/p mechanical fall. fell forward at restaurant and hit her ribs against the back of a chair. denies LOC, head trauma/AC use. hx of hypothyroidism

## 2025-06-03 NOTE — ED ADULT NURSE NOTE - OBJECTIVE STATEMENT
Pt received in intake room 14, aaox3, ambulatory, breathing even and unlabored in bed. Pt came with c/o right sided rib pain and right ankle pain. Right foot/ankle visibly swollen. Pt came s/p trip and fall over a chair this evening. Pt denies chest pain, SOB, dizziness, headache, blurry vision, chills. Bed in lowest position, call bell within reach.

## 2025-06-03 NOTE — ED PROVIDER NOTE - CLINICAL SUMMARY MEDICAL DECISION MAKING FREE TEXT BOX
61 y/o F with PMH of hypothyroidism, presents c/o right rib and right foot pain s/p fall earlier today. Patient likely with rib contusion and ankle sprain but will obtain imaging to r/o underlying fractures. Will treat with motrin. Will reassess. 61 y/o F with PMH of hypothyroidism, presents c/o right rib and right foot pain s/p fall earlier today. Patient likely with rib contusion and ankle sprain but will obtain imaging to r/o underlying fractures. Will treat with motrin. Will reassess.  Xray ankle shows "Small osseous fragment at the anterior aspect of the talus, suspicious for acute fracture. Small ankle joint effusion." Rest of imaging unremarkable for acute findings. Discussed case with podiatry who notes that patient would need a cam boot from the podiatry office outpatient. Patient will be placed in a posterior splint in the meantime until follow up within 1 week with Dr. García. Patient comfortable and stable for discharge with pcp and podiatry follow up. Instructed to return to the ED immediately for any worsening symptoms or new concerns.    PLAN AND FOLLOW-UP: Patient counseled on all findings, diagnosis and treatment plan. Patient's questions and concerns addressed. Patient stable, discharged with instructions to follow up with PMD, and to return to ED at any time for worsening symptoms or any other concerns. Patient demonstrates understanding of the findings and the importance of appropriate follow up care.

## 2025-06-03 NOTE — ED PROVIDER NOTE - CARE PROVIDER_API CALL
Jeremie García  Foot and Ankle Surgery  [address], [address]  [place] [address]  Phone: [phone]  Fax: [phone]  Follow Up Time: 4-6 Days

## 2025-06-03 NOTE — ED PROVIDER NOTE - OBJECTIVE STATEMENT
59 y/o F with PMH of hypothyroidism, presents c/o right rib and right foot pain s/p fall earlier today. Patient was getting up from a chair at around 2:30pm when she tripped on the chair leg and fell over with the chair landing on top of her. She notes that the leg of the chair hit her in her chest by her right rib and sternum and she twisted her right ankle while falling. Initially she just felt very bruised but did not have any significant pain, however, as the day progressed she developed worsened pain in her chest with more difficulty taking a deep breath as well as worsened right foot pain. She was able to walk on the foot after the initial injury but at this point can not move it or bear weight without significant pain. Denies any other complaints or concerns. Denies cough, fevers, chills, abdominal pain, N/V/D/C, urinary complaints, HA, dizziness, numbness, tingling, weakness, sick contacts, recent travel.

## 2025-06-03 NOTE — ED PROVIDER NOTE - PHYSICAL EXAMINATION
CONSTITUTIONAL: Comfortable; in no acute distress. Non-toxic appearing.   NEURO: Alert & oriented. Sensory and motor functions are grossly intact.  PSYCH: Mood appropriate. Thought processes intact.   HEAD: NCAT  CARD: Regular rate and rhythm, no murmurs  CHEST: (+) reproducible tenderness over the lower sternum and right ribs below the breast. No significant overlying contusions or obvious gross deformities.  RESP: No accessory muscle use; breath sounds clear and equal bilaterally; no wheezes, rhonchi, or rales     MUSCULOSKELETAL/EXTREMITIES: (+) right ankle swelling below the lateral malleolus with tenderness over the fifth metatarsal and proximal dorsum of the foot. No significant ecchymosis. Limited ROM of the ankle and toes 2/2 pain. Neurovascularly intact. Rest of extremities: wnl.  SKIN: Warm; dry; no apparent lesions or exudate

## 2025-06-03 NOTE — ED ADULT NURSE NOTE - NSFALLUNIVINTERV_ED_ALL_ED
Bed/Stretcher in lowest position, wheels locked, appropriate side rails in place/Call bell, personal items and telephone in reach/Instruct patient to call for assistance before getting out of bed/chair/stretcher/Non-slip footwear applied when patient is off stretcher/[place] to call system/Physically safe environment - no spills, clutter or unnecessary equipment/Purposeful proactive rounding/Room/bathroom lighting operational, light cord in reach

## 2025-06-04 VITALS
HEART RATE: 80 BPM | RESPIRATION RATE: 16 BRPM | DIASTOLIC BLOOD PRESSURE: 68 MMHG | OXYGEN SATURATION: 100 % | SYSTOLIC BLOOD PRESSURE: 109 MMHG | TEMPERATURE: 99 F

## 2025-06-04 RX ORDER — OXYCODONE HYDROCHLORIDE 30 MG/1
2.5 TABLET ORAL ONCE
Refills: 0 | Status: DISCONTINUED | OUTPATIENT
Start: 2025-06-04 | End: 2025-06-04

## 2025-06-04 RX ADMIN — OXYCODONE HYDROCHLORIDE 2.5 MILLIGRAM(S): 30 TABLET ORAL at 03:21

## 2025-07-14 NOTE — BRIEF OPERATIVE NOTE - OPERATION/FINDINGS
Small mobile anteverted uterus. Normal appearing tubes and ovaries. No adhesions noted. S/P prior julia. Rest of organs appeared normal. Detail Level: Detailed Detail Level: Zone Detail Level: Simple Patient Specific Counseling (Will Not Stick From Patient To Patient): some cryo needed-she wants to book cse

## 2025-07-17 ENCOUNTER — EMERGENCY (EMERGENCY)
Facility: HOSPITAL | Age: 60
LOS: 1 days | End: 2025-07-17
Attending: EMERGENCY MEDICINE | Admitting: EMERGENCY MEDICINE
Payer: COMMERCIAL

## 2025-07-17 VITALS
OXYGEN SATURATION: 99 % | RESPIRATION RATE: 12 BRPM | HEART RATE: 77 BPM | DIASTOLIC BLOOD PRESSURE: 75 MMHG | SYSTOLIC BLOOD PRESSURE: 113 MMHG | TEMPERATURE: 98 F

## 2025-07-17 VITALS
HEART RATE: 98 BPM | SYSTOLIC BLOOD PRESSURE: 101 MMHG | RESPIRATION RATE: 17 BRPM | TEMPERATURE: 98 F | DIASTOLIC BLOOD PRESSURE: 71 MMHG | OXYGEN SATURATION: 100 % | WEIGHT: 145.06 LBS

## 2025-07-17 DIAGNOSIS — Z98.890 OTHER SPECIFIED POSTPROCEDURAL STATES: Chronic | ICD-10-CM

## 2025-07-17 DIAGNOSIS — Z90.49 ACQUIRED ABSENCE OF OTHER SPECIFIED PARTS OF DIGESTIVE TRACT: Chronic | ICD-10-CM

## 2025-07-17 LAB
ALBUMIN SERPL ELPH-MCNC: 4.1 G/DL — SIGNIFICANT CHANGE UP (ref 3.3–5)
ALP SERPL-CCNC: 88 U/L — SIGNIFICANT CHANGE UP (ref 40–120)
ALT FLD-CCNC: 12 U/L — SIGNIFICANT CHANGE UP (ref 4–33)
ANION GAP SERPL CALC-SCNC: 12 MMOL/L — SIGNIFICANT CHANGE UP (ref 7–14)
AST SERPL-CCNC: 17 U/L — SIGNIFICANT CHANGE UP (ref 4–32)
BASOPHILS # BLD AUTO: 0.02 K/UL — SIGNIFICANT CHANGE UP (ref 0–0.2)
BASOPHILS NFR BLD AUTO: 0.6 % — SIGNIFICANT CHANGE UP (ref 0–2)
BILIRUB SERPL-MCNC: 0.2 MG/DL — SIGNIFICANT CHANGE UP (ref 0.2–1.2)
BLOOD GAS VENOUS COMPREHENSIVE RESULT: SIGNIFICANT CHANGE UP
BUN SERPL-MCNC: 11 MG/DL — SIGNIFICANT CHANGE UP (ref 7–23)
CALCIUM SERPL-MCNC: 9.5 MG/DL — SIGNIFICANT CHANGE UP (ref 8.4–10.5)
CHLORIDE SERPL-SCNC: 99 MMOL/L — SIGNIFICANT CHANGE UP (ref 98–107)
CO2 SERPL-SCNC: 25 MMOL/L — SIGNIFICANT CHANGE UP (ref 22–31)
CREAT SERPL-MCNC: 0.72 MG/DL — SIGNIFICANT CHANGE UP (ref 0.5–1.3)
D DIMER BLD IA.RAPID-MCNC: <150 NG/ML DDU — SIGNIFICANT CHANGE UP
EGFR: 97 ML/MIN/1.73M2 — SIGNIFICANT CHANGE UP
EGFR: 97 ML/MIN/1.73M2 — SIGNIFICANT CHANGE UP
EOSINOPHIL # BLD AUTO: 0.04 K/UL — SIGNIFICANT CHANGE UP (ref 0–0.5)
EOSINOPHIL NFR BLD AUTO: 1.2 % — SIGNIFICANT CHANGE UP (ref 0–6)
GLUCOSE SERPL-MCNC: 98 MG/DL — SIGNIFICANT CHANGE UP (ref 70–99)
HCT VFR BLD CALC: 38.2 % — SIGNIFICANT CHANGE UP (ref 34.5–45)
HGB BLD-MCNC: 12.5 G/DL — SIGNIFICANT CHANGE UP (ref 11.5–15.5)
IMM GRANULOCYTES # BLD AUTO: 0.01 K/UL — SIGNIFICANT CHANGE UP (ref 0–0.07)
IMM GRANULOCYTES NFR BLD AUTO: 0.3 % — SIGNIFICANT CHANGE UP (ref 0–0.9)
LYMPHOCYTES # BLD AUTO: 1.43 K/UL — SIGNIFICANT CHANGE UP (ref 1–3.3)
LYMPHOCYTES NFR BLD AUTO: 43.1 % — SIGNIFICANT CHANGE UP (ref 13–44)
MAGNESIUM SERPL-MCNC: 1.9 MG/DL — SIGNIFICANT CHANGE UP (ref 1.6–2.6)
MCHC RBC-ENTMCNC: 29.3 PG — SIGNIFICANT CHANGE UP (ref 27–34)
MCHC RBC-ENTMCNC: 32.7 G/DL — SIGNIFICANT CHANGE UP (ref 32–36)
MCV RBC AUTO: 89.5 FL — SIGNIFICANT CHANGE UP (ref 80–100)
MONOCYTES # BLD AUTO: 0.31 K/UL — SIGNIFICANT CHANGE UP (ref 0–0.9)
MONOCYTES NFR BLD AUTO: 9.3 % — SIGNIFICANT CHANGE UP (ref 2–14)
NEUTROPHILS # BLD AUTO: 1.51 K/UL — LOW (ref 1.8–7.4)
NEUTROPHILS NFR BLD AUTO: 45.5 % — SIGNIFICANT CHANGE UP (ref 43–77)
NRBC # BLD AUTO: 0 K/UL — SIGNIFICANT CHANGE UP (ref 0–0)
NRBC # FLD: 0 K/UL — SIGNIFICANT CHANGE UP (ref 0–0)
NRBC BLD AUTO-RTO: 0 /100 WBCS — SIGNIFICANT CHANGE UP (ref 0–0)
PHOSPHATE SERPL-MCNC: 3.6 MG/DL — SIGNIFICANT CHANGE UP (ref 2.5–4.5)
PLATELET # BLD AUTO: 323 K/UL — SIGNIFICANT CHANGE UP (ref 150–400)
PMV BLD: 9.9 FL — SIGNIFICANT CHANGE UP (ref 7–13)
POTASSIUM SERPL-MCNC: 4.3 MMOL/L — SIGNIFICANT CHANGE UP (ref 3.5–5.3)
POTASSIUM SERPL-SCNC: 4.3 MMOL/L — SIGNIFICANT CHANGE UP (ref 3.5–5.3)
PROT SERPL-MCNC: 7.5 G/DL — SIGNIFICANT CHANGE UP (ref 6–8.3)
RBC # BLD: 4.27 M/UL — SIGNIFICANT CHANGE UP (ref 3.8–5.2)
RBC # FLD: 12.7 % — SIGNIFICANT CHANGE UP (ref 10.3–14.5)
SODIUM SERPL-SCNC: 136 MMOL/L — SIGNIFICANT CHANGE UP (ref 135–145)
TROPONIN T, HIGH SENSITIVITY RESULT: <6 NG/L — SIGNIFICANT CHANGE UP
WBC # BLD: 3.32 K/UL — LOW (ref 3.8–10.5)
WBC # FLD AUTO: 3.32 K/UL — LOW (ref 3.8–10.5)

## 2025-07-17 PROCEDURE — 93010 ELECTROCARDIOGRAM REPORT: CPT

## 2025-07-17 PROCEDURE — 99285 EMERGENCY DEPT VISIT HI MDM: CPT

## 2025-07-17 PROCEDURE — 71275 CT ANGIOGRAPHY CHEST: CPT | Mod: 26

## 2025-07-17 PROCEDURE — 71046 X-RAY EXAM CHEST 2 VIEWS: CPT | Mod: 26

## 2025-07-17 NOTE — ED ADULT NURSE REASSESSMENT NOTE - NS ED NURSE REASSESS COMMENT FT1
break coverage RN: pt resting in stretcher, RR even and unlabored on RA, spo2 99%, remains on cardiac monitor noted to be NSR. VS stable. pending CT. safety maintained,

## 2025-07-17 NOTE — ED PROVIDER NOTE - NSICDXPASTMEDICALHX_GEN_ALL_CORE_FT
PAST MEDICAL HISTORY:  Anxiety and depression     Endometriosis     H/O gastritis     History of diverticulitis     History of umbilical hernia     Hypothyroidism     IBS (irritable bowel syndrome)     Kidney stones     Pulmonary embolus     Uterine fibroid     Uterine polyp

## 2025-07-17 NOTE — ED PROVIDER NOTE - PHYSICAL EXAMINATION
John Finley DO (PGY2)   Physical Exam:    Gen: NAD, AOx3  Head: NCAT  HEENT: EOMI, PEERL  Lung: CTAB  CV: RRR  Abd: soft, NT, ND  MSK: Boot noted on the right, no peripheral edema  Neuro: No focal sensory or motor deficits  Skin: Warm, well perfused  Psych: normal affect, calm John Finley DO (PGY2)   Physical Exam:    Gen: NAD, AOx3  Head: NCAT  HEENT: EOMI, PEERL  Lung: CTAB  CV: RRR  Abd: soft, NT, ND  MSK: Boot noted on the right, no peripheral edema  Neuro: No focal sensory or motor deficits  Skin: Warm, well perfused  Psych: normal affect, calm  ext 2+ pulses radial

## 2025-07-17 NOTE — ED PROVIDER NOTE - OBJECTIVE STATEMENT
60-year-old female with past medical history of hypothyroidism, PE, no longer on Eliquis, tachycardia  presents to the ED with complaints of chest pressure, shortness of breath and choking sensation for the past 2 days.  States that the symptoms started out of the blue and that she was not doing anything strenuous when the symptoms started.  Also states that she is lightheaded and has decreased focus.  Notes that she had a boot placed for a fractured bone in the foot few weeks ago and that she has not been as mobile.  Otherwise denies any fevers, nausea, abdominal pain, dysuria. 60-year-old female with past medical history of hypothyroidism, PE, no longer on Eliquis, tachycardia  presents to the ED with complaints of chest pressure, shortness of breath and choking sensation for the past 2 days.  States that the symptoms started out of the blue and that she was not doing anything strenuous when the symptoms started.  Also states that she is lightheaded and has decreased focus.  Notes that she had a boot placed for a fractured bone in the foot few weeks ago and that she has not been as mobile.  Otherwise denies any fevers, nausea, abdominal pain, dysuria.No symptoms currently.  Symptoms come and go without exacerbating factors

## 2025-07-17 NOTE — ED PROVIDER NOTE - CLINICAL SUMMARY MEDICAL DECISION MAKING FREE TEXT BOX
60-year-old female past medical history of PE no longer on Eliquis presents with chest pressure, shortness of breath, choking sensation for the past 2 days.  Patient with recent placement and is less mobile.  Patient borderline tachycardic heart rate of 94.  Given symptoms of shortness of breath, immobilization and history of PE, will obtain CT angiogram.  Will obtain labs, chest x-ray, and labs.  Will also obtain labs to evaluate for ACS.

## 2025-07-17 NOTE — ED PROVIDER NOTE - NSFOLLOWUPINSTRUCTIONS_ED_ALL_ED_FT
- You were seen in the emergency department today for shortness of breath and chest pressure.    - Lab and imaging results were discussed with you along with your discharge diagnosis.    - Follow up with your doctor in 1 week - bring copies of your results.     - Return to the ED for any new, worsening, or concerning symptoms to you. Please return to the ED if you have worsening shortness of breath, increasing chest pain, dyspnea on exertion or any other concerning symptoms to you.    - Take ibuprofen/tylenol as directed as needed for pain.     - Rest and keep yourself hydrated with fluids.

## 2025-07-17 NOTE — ED ADULT NURSE NOTE - OBJECTIVE STATEMENT
Pt received to intake room 3. Pt is a 60-year-old female with Hx of hypothyroidism, PE (not on anticoagulation anymore). Pt c/o intermittent chest pressure and SOB x 2 days. Pt states "It feels like I am choking". Pt also reports decreased focus. Pt is A&Ox4, ambulatory. airway patent, speaking clearly in full sentences. breathing is even and unlabored on room air. spontaneous movement of all extremities. boot noted on LLE. left AC 20G IV placed, +blood return, flushes without difficulty. comfort measures provided. stretcher set in lowest position, call bell within reach, safety maintained.

## 2025-07-17 NOTE — ED PROVIDER NOTE - PATIENT PORTAL LINK FT
You can access the FollowMyHealth Patient Portal offered by [facility] by registering at the following website: [email or web address]. By joining [facility]’s FollowMyHealth portal, you will also be able to view your health information using other applications (apps) compatible with our system.

## 2025-07-17 NOTE — ED ADULT TRIAGE NOTE - CHIEF COMPLAINT QUOTE
Downtime Recovery, Triage done by TONIA Sampson  C/o chest pressure and SOB x 2 days. denies n/v/d, fevers, chills. Hx hypothyroidism, tachycardia

## 2025-07-17 NOTE — ED PROVIDER NOTE - ATTENDING CONTRIBUTION TO CARE
I performed a face to face evaluation of this patient and performed a full history and physical examination on the patient.  I agree with the resident's history, physical examination, and plan of the patient.    60-year-old female past medical history of PE no longer on Eliquis presents with chest pressure, shortness of breath, choking sensation for the past 2 days.  Patient with recent placement and is less mobile.  Patient borderline tachycardic heart rate of 94.  Given symptoms of shortness of breath, immobilization and history of PE, will obtain CT angiogram.  Will obtain labs, chest x-ray, and labs.  Will also obtain labs to evaluate for ACS.    Pt well appearing,    heart and lung wnl  abd soft nontender,  ext rle in boot and foot wnl without swelling when boot off  lle wnl  no swelling  neuro wnl    Pt requires advanced workup and monitoring